# Patient Record
Sex: FEMALE | Race: WHITE | NOT HISPANIC OR LATINO | Employment: OTHER | URBAN - METROPOLITAN AREA
[De-identification: names, ages, dates, MRNs, and addresses within clinical notes are randomized per-mention and may not be internally consistent; named-entity substitution may affect disease eponyms.]

---

## 2017-05-02 ENCOUNTER — APPOINTMENT (OUTPATIENT)
Dept: WOMENS IMAGING | Facility: HOSPITAL | Age: 72
End: 2017-05-02

## 2017-05-02 PROCEDURE — 77063 BREAST TOMOSYNTHESIS BI: CPT | Performed by: RADIOLOGY

## 2017-05-02 PROCEDURE — G0202 SCR MAMMO BI INCL CAD: HCPCS | Performed by: RADIOLOGY

## 2018-01-05 ENCOUNTER — APPOINTMENT (OUTPATIENT)
Dept: LAB | Facility: HOSPITAL | Age: 73
End: 2018-01-05

## 2018-01-05 ENCOUNTER — OFFICE VISIT (OUTPATIENT)
Dept: ONCOLOGY | Facility: CLINIC | Age: 73
End: 2018-01-05

## 2018-01-05 VITALS
HEART RATE: 90 BPM | DIASTOLIC BLOOD PRESSURE: 70 MMHG | BODY MASS INDEX: 19.94 KG/M2 | TEMPERATURE: 97.4 F | OXYGEN SATURATION: 97 % | RESPIRATION RATE: 16 BRPM | HEIGHT: 64 IN | WEIGHT: 116.8 LBS | SYSTOLIC BLOOD PRESSURE: 104 MMHG

## 2018-01-05 DIAGNOSIS — Z85.3 HISTORY OF BREAST CANCER: Primary | ICD-10-CM

## 2018-01-05 PROCEDURE — G0463 HOSPITAL OUTPT CLINIC VISIT: HCPCS | Performed by: INTERNAL MEDICINE

## 2018-01-05 PROCEDURE — 99213 OFFICE O/P EST LOW 20 MIN: CPT | Performed by: INTERNAL MEDICINE

## 2018-01-05 NOTE — PROGRESS NOTES
[facility] OUTPATIENT CLINIC FOLLOW UP VISIT    REASON FOR FOLLOW-UP:    History of stage I, weakly hormone-receptor positive, HER-2 overexpressed, invasive lobular carcinoma of the upper outer left breast with a background of atypical ductal hyperplasia and ductal carcinoma in situ. Ultimately she had a left mastectomy with sentinel lymph node biopsy performed on 07/13/2015.     HISTORY OF PRESENT ILLNESS:  Fany Todd is a 72 y.o. female who returns today for follow up of the above issue.  She continues to feel very well.  She continues peritoneal dialysis.  No new issues with her right breast and she had a mammogram in May which looked good.    PAST MEDICAL, SURGICAL, FAMILY, AND SOCIAL HISTORIES WERE REVIEWED WITH THE PATIENT AND IN THE ELECTRONIC MEDICAL RECORD, AND WERE UPDATED IF INDICATED.    ALLERGIES:  No Known Allergies    MEDICATIONS:  The medication list has been reviewed with the patient by the medical assistant, and the list has been updated in the electronic medical record, which I reviewed.  Medication dosages and frequencies were confirmed to be accurate.    REVIEW OF SYSTEMS:  PAIN:  See Vital Signs below.  GENERAL:  No fevers, chills, night sweats, or unintended weight loss.  SKIN:  No rashes or non-healing lesions.  HEME/LYMPH:  No abnormal bleeding.  No palpable lymphadenopathy.  EYES:  No vision changes or diplopia.  ENT:  No sore throat or difficulty swallowing.  RESPIRATORY:  No cough, shortness of breath, hemoptysis, or wheezing.  CARDIOVASCULAR:  No chest pain, palpitations, orthopnea, or dyspnea on exertion.  GASTROINTESTINAL:  No abdominal pain, nausea, vomiting, constipation, diarrhea, melena, or hematochezia.  GENITOURINARY:  No dysuria or hematuria.  MUSCULOSKELETAL:  No joint pain, swelling, or erythema.  NEUROLOGIC:  No dizziness, loss of consciousness, or seizures.  PSYCHIATRIC:  No depression, anxiety, or mood changes.    Vitals:    01/05/18 1015   BP: 104/70  "  Pulse: 90   Resp: 16   Temp: 97.4 °F (36.3 °C)   TempSrc: Oral   SpO2: 97%   Weight: 53 kg (116 lb 12.8 oz)   Height: 161.5 cm (63.58\")   PainSc: 0-No pain       PHYSICAL EXAMINATION:  GENERAL:  Well-developed well-nourished female; awake, alert and oriented, in no acute distress.  SKIN:  Warm and dry, without rashes, purpura, or petechiae.  HEAD:  Normocephalic, atraumatic.  EYES:  Pupils equal, round and reactive to light.  Extraocular movements intact.  Conjunctivae normal.  EARS:  Hearing intact.  NOSE:  Septum midline.  No excoriations or nasal discharge.  MOUTH:  No stomatitis or ulcers.  Lips are normal.  THROAT:  Oropharynx without lesions or exudates.  NECK:  Supple with good range of motion; no thyromegaly or masses; no JVD or bruits.  LYMPHATICS:  No cervical, supraclavicular, axillary, or inguinal lymphadenopathy.  CHEST:  Lungs are clear to auscultation bilaterally.  No wheezes, rales, or rhonchi.  BREASTS:  The left chest wall and axilla were examined today.  Well-healed surgical incision.  No skin changes or subcutaneous nodules.  No left axillary lymphadenopathy.  The right breast was examined today.  No nodules or nipple discharge.  HEART:  Regular rate; normal rhythm.  No murmurs, gallops or rubs.  ABDOMEN:  Soft, non-tender, non-distended.  Normal active bowel sounds.  No organomegaly.  Peritoneal dialysis catheter present.  EXTREMITIES:  No clubbing, cyanosis, or edema.  NEUROLOGICAL:  No focal neurologic deficits.    DIAGNOSTIC DATA:  Lab Results   Component Value Date    WBC 7.51 11/04/2016    HGB 10.3 (L) 11/04/2016    HCT 29.2 (L) 11/04/2016    MCV 95.1 11/04/2016     11/04/2016       IMAGING:  A mammogram of the right breast was performed on 5/2/2017.  Repeat mammography in 1 year was recommended.      ASSESSMENT:  This is a 72 y.o. female with:  History of stage I, weakly hormone-receptor positive, HER-2 overexpressed, invasive lobular carcinoma of the upper outer left breast with " a background of atypical ductal hyperplasia and ductal carcinoma in situ. Ultimately she had a left mastectomy with sentinel lymph node biopsy performed on 07/13/2015.  Lymph nodes were negative.  No further treatment was advised.  Continue to see her annually.  She will continue to have annual mammography of the right breast which is scheduled in May.    PLAN:   1.  Return in 12 months for follow-up.  No labs.  Breast exam at that time.

## 2018-04-16 ENCOUNTER — HOSPITAL ENCOUNTER (OUTPATIENT)
Dept: CT IMAGING | Facility: HOSPITAL | Age: 73
Discharge: HOME | End: 2018-04-16
Attending: INTERNAL MEDICINE | Admitting: INTERNAL MEDICINE

## 2018-04-16 ENCOUNTER — TRANSCRIBE ORDERS (OUTPATIENT)
Dept: ADMINISTRATIVE | Facility: HOSPITAL | Age: 73
End: 2018-04-16

## 2018-04-16 DIAGNOSIS — R10.30 LOWER ABDOMINAL PAIN: Primary | ICD-10-CM

## 2018-04-16 DIAGNOSIS — R10.30 LOWER ABDOMINAL PAIN: ICD-10-CM

## 2018-04-16 LAB — CREAT BLDA-MCNC: 9.8 MG/DL (ref 0.6–1.3)

## 2018-04-16 PROCEDURE — 82565 ASSAY OF CREATININE: CPT

## 2018-04-16 PROCEDURE — 25010000002 IOPAMIDOL 61 % SOLUTION: Performed by: INTERNAL MEDICINE

## 2018-04-16 PROCEDURE — 74177 CT ABD & PELVIS W/CONTRAST: CPT

## 2018-04-16 RX ADMIN — IOPAMIDOL 85 ML: 612 INJECTION, SOLUTION INTRAVENOUS at 18:26

## 2018-04-16 NOTE — NURSING NOTE
Results given to on call nurse at Dr. Schwab's office. Msg relayed to him and informed by nurse that pt may go. S/L dc'd with cath tip intact.

## 2018-05-08 ENCOUNTER — APPOINTMENT (OUTPATIENT)
Dept: WOMENS IMAGING | Facility: HOSPITAL | Age: 73
End: 2018-05-08

## 2018-05-08 PROCEDURE — 77063 BREAST TOMOSYNTHESIS BI: CPT | Performed by: RADIOLOGY

## 2018-05-08 PROCEDURE — 77067 SCR MAMMO BI INCL CAD: CPT | Performed by: RADIOLOGY

## 2018-07-25 ENCOUNTER — HOSPITAL ENCOUNTER (EMERGENCY)
Facility: HOSPITAL | Age: 73
Discharge: HOME | End: 2018-07-25
Attending: EMERGENCY MEDICINE | Admitting: EMERGENCY MEDICINE

## 2018-07-25 VITALS
OXYGEN SATURATION: 98 % | SYSTOLIC BLOOD PRESSURE: 177 MMHG | TEMPERATURE: 97.7 F | BODY MASS INDEX: 21.63 KG/M2 | HEART RATE: 74 BPM | WEIGHT: 122.1 LBS | HEIGHT: 63 IN | RESPIRATION RATE: 14 BRPM | DIASTOLIC BLOOD PRESSURE: 95 MMHG

## 2018-07-25 DIAGNOSIS — M54.42 ACUTE LEFT-SIDED LOW BACK PAIN WITH LEFT-SIDED SCIATICA: Primary | ICD-10-CM

## 2018-07-25 PROCEDURE — 99283 EMERGENCY DEPT VISIT LOW MDM: CPT

## 2018-07-25 RX ORDER — BACLOFEN 5 MG/1
10 TABLET ORAL 3 TIMES DAILY PRN
Qty: 30 TABLET | Refills: 0 | OUTPATIENT
Start: 2018-07-25 | End: 2019-05-02

## 2018-07-25 RX ORDER — CHOLECALCIFEROL (VITAMIN D3) 50 MCG
2000 TABLET ORAL 3 TIMES DAILY
COMMUNITY

## 2018-07-25 RX ORDER — HYDROCODONE BITARTRATE AND ACETAMINOPHEN 5; 325 MG/1; MG/1
1 TABLET ORAL EVERY 6 HOURS PRN
Qty: 10 TABLET | Refills: 0 | OUTPATIENT
Start: 2018-07-25 | End: 2019-05-02

## 2018-07-25 RX ORDER — METHYLPREDNISOLONE 4 MG/1
TABLET ORAL
Qty: 1 EACH | Refills: 0 | OUTPATIENT
Start: 2018-07-25 | End: 2019-05-02

## 2018-11-02 ENCOUNTER — HOSPITAL ENCOUNTER (EMERGENCY)
Facility: HOSPITAL | Age: 73
Discharge: HOME | End: 2018-11-02
Attending: EMERGENCY MEDICINE | Admitting: EMERGENCY MEDICINE

## 2018-11-02 VITALS
SYSTOLIC BLOOD PRESSURE: 155 MMHG | TEMPERATURE: 96.6 F | RESPIRATION RATE: 18 BRPM | DIASTOLIC BLOOD PRESSURE: 89 MMHG | BODY MASS INDEX: 20.38 KG/M2 | OXYGEN SATURATION: 99 % | WEIGHT: 115 LBS | HEART RATE: 70 BPM | HEIGHT: 63 IN

## 2018-11-02 DIAGNOSIS — M54.42 ACUTE LEFT-SIDED LOW BACK PAIN WITH LEFT-SIDED SCIATICA: Primary | ICD-10-CM

## 2018-11-02 PROCEDURE — 99282 EMERGENCY DEPT VISIT SF MDM: CPT

## 2018-11-02 RX ORDER — METHYLPREDNISOLONE 4 MG/1
TABLET ORAL
Qty: 21 EACH | Refills: 0 | OUTPATIENT
Start: 2018-11-02 | End: 2019-05-02

## 2018-11-02 RX ORDER — HYDROCODONE BITARTRATE AND ACETAMINOPHEN 5; 325 MG/1; MG/1
1 TABLET ORAL EVERY 4 HOURS PRN
Qty: 8 TABLET | Refills: 0 | OUTPATIENT
Start: 2018-11-02 | End: 2019-05-02

## 2018-11-02 NOTE — ED PROVIDER NOTES
EMERGENCY DEPARTMENT ENCOUNTER    CHIEF COMPLAINT  Chief Complaint: back pain  History given by: patient  History limited by: N/A  Time Seen: 1146  Room Number: 05/05  PMD: Carlos Schwab MD      HPI:  Pt is a 73 y.o. female who presents with intermittent left lower back pain radiating to LLE that started about 3 days ago. It is exacerbated by remaining still and lying down and is alleviated by movement and walking. She also reports having intermittent LLE numbness/tingling (currently resolved). She notes that she recently restarted yoga but cannot recall sustaining an injury or trauma. She also denies motor loss, bladder dysfunction, bowel dysfunction, saddle anesthesia, fever, chills, chest pain, dyspnea, abd pain, pain and difficulty with urination, and N/V/D. She notes that she had similar sx in 7/2018 due to sciatica which resolved after she was seen at [facility] and was prescribed pain medication and steroid. Past Medical History of ESRD (performs peritoneal dialysis nightly), HTN, and anemia.    Duration: started about 3 days ago  Timing: intermittent  Location: left lower back  Radiation: LLE  Quality: pain  Intensity/Severity: moderate  Progression: unchanged  Associated Symptoms: intermittent LLE numbness/tingling (currently resolved)  Aggravating Factors: remaining still, lying down  Alleviating Factors: movement, walking  Previous Episodes: Pt states that she had similar sx in 7/2018 due to sciatica which resolved after she was seen at [facility] and was prescribed pain medication and steroid.  Treatment before arrival: none mentioned    PAST MEDICAL HISTORY  Active Ambulatory Problems     Diagnosis Date Noted   • Anemia 11/04/2016   • History of breast cancer 11/04/2016     Resolved Ambulatory Problems     Diagnosis Date Noted   • No Resolved Ambulatory Problems     Past Medical History:   Diagnosis Date   • Anemia    • Breast cancer (CMS/[facility]ly [facility]) 2015   • End stage renal disease (CMS/Formerly [facility])    • Hypertension     • Peritoneal dialysis catheter in place (CMS/Formerly [facility])        PAST SURGICAL HISTORY  Past Surgical History:   Procedure Laterality Date   • APPENDECTOMY     • COLONOSCOPY     • MASTECTOMY Left 01/2015   • TUBAL ABDOMINAL LIGATION         FAMILY HISTORY  Family History   Problem Relation Age of Onset   • Cervical cancer Mother    • Hypertension Father    • Breast cancer Sister    • Hypertension Sister    • Heart disease Maternal Aunt    • Cancer Maternal Aunt    • Cancer Maternal Uncle    • Heart disease Maternal Grandmother    • Cancer Cousin        SOCIAL HISTORY  Social History     Social History   • Marital status: Single     Spouse name: N/A   • Number of children: N/A   • Years of education: High school     Occupational History   • -HR Retired     [employer]     Social History Main Topics   • Smoking status: Never Smoker   • Smokeless tobacco: Not on file   • Alcohol use No   • Drug use: No   • Sexual activity: Defer     Other Topics Concern   • Not on file     Social History Narrative   • No narrative on file         ALLERGIES  Patient has no known allergies.    REVIEW OF SYSTEMS  Review of Systems   Constitutional: Negative for chills and fever.   HENT: Negative for sore throat.    Respiratory: Negative for shortness of breath.    Cardiovascular: Negative for chest pain.   Gastrointestinal: Negative for abdominal pain, diarrhea, nausea and vomiting.        No bowel dysfunction   Genitourinary: Negative for difficulty urinating and dysuria.        No bladder dysfunction   Musculoskeletal: Positive for back pain (left lower back pain radiating to LLE).   Skin: Negative for rash.   Neurological: Positive for numbness ( intermittent LLE numbness/tingling (currently resolved)  ). Negative for dizziness and weakness.        No saddle anesthesia    Psychiatric/Behavioral: The patient is not nervous/anxious.        PHYSICAL EXAM  ED Triage Vitals   Temp Heart Rate Resp BP SpO2   11/02/18 0934  11/02/18 0934 11/02/18 0934 11/02/18 1035 11/02/18 0934   96.6 °F (35.9 °C) 92 18 166/100 99 % WNL       Physical Exam   Constitutional: She is oriented to person, place, and time and well-developed, well-nourished, and in no distress.   HENT:   Head: Normocephalic and atraumatic.   Mouth/Throat: Mucous membranes are normal.   Eyes: EOM are normal. No scleral icterus.   Neck: Normal range of motion.   Cardiovascular: Normal rate, regular rhythm and normal heart sounds.    Pulses:       Dorsalis pedis pulses are 2+ on the right side, and 2+ on the left side.        Posterior tibial pulses are 2+ on the right side, and 2+ on the left side.   Pulmonary/Chest: Effort normal and breath sounds normal. No respiratory distress.   Abdominal: Soft. There is no tenderness. There is no rebound and no guarding.   Musculoskeletal:   Mild left SI joint tenderness, no midline l-spine tenderness, no lumbar tenderness, negative straight leg raises bilaterally, NV intact distally to BLE   Neurological: She is alert and oriented to person, place, and time. She has normal motor skills and normal sensation. Gait normal.   Sensation and motor function intact to BLE   Skin: Skin is warm and dry.   Psychiatric: Mood and affect normal.   Nursing note and vitals reviewed.        MEDICAL RECORD REVIEW    Pt was seen at [facility] ED on 7/25/18 for acute left sided back pain with sciatica. At the time, she was prescribed baclofen, norco, and medrol pack.       PROGRESS AND CONSULTS  1210- Informed pt that her sx could possibly be due to left sided sciatica for which she will be prescribed steroid and short course of pain medication. Pt declines prescription for muscle relaxer. Provided discharge instructions for sciatica. Advised pt to apply ice or heat to back with gentle stretching and to perform activity as tolerated.  Reviewed implications of results, diagnosis, meds, responsibility to follow up, warning signs and symptoms of possible worsening,  "potential complications and reasons to return to ER with patient.  Discussed all results and noted any abnormalities with patient.  Discussed absolute need to recheck abnormalities and condition with PMD closely.   Discussed plan for discharge, as there is no emergent indication for admission.  Pt is agreeable and understands need for follow up and repeat testing.  Pt is aware that discharge does not mean that nothing is wrong but it indicates no emergency is present.  Pt is discharged with instructions to follow up with primary care doctor to have their blood pressure rechecked.     1213- Reviewed pt's history and workup with Dr. Castellanos.  At bedside evaluation, they agree with the plan of care.      DIAGNOSIS  Final diagnoses:   Acute left-sided low back pain with left-sided sciatica       FOLLOW UP   Carlos Schwab MD  [address] [address]  [phone]    Schedule an appointment as soon as possible for a visit   As needed      RX     Medication List       HYDROcodone-acetaminophen 5-325 MG per tablet  Commonly known as:  NORCO  Take 1 tablet by mouth Every 4 (Four) Hours As Needed for Moderate Pain .     MethylPREDNISolone 4 MG tablet  Commonly known as:  MEDROL (KAMRYN)  Take as directed on package instructions.       Otf report [id number] reviewed.  Risks, benefits, alternatives discussed with patient.  Pt consents to treatment and agrees to follow up with PMD tomorrow for further care and any other prescriptions.         MEDICATIONS GIVEN IN ER  Medications - No data to display    /89 (Patient Position: Lying)   Pulse 70   Temp 96.6 °F (35.9 °C) (Tympanic)   Resp 18   Ht 160 cm (63\")   Wt 52.2 kg (115 lb)   SpO2 99%   BMI 20.37 kg/m²       I personally reviewed the past medical history, past surgical history, social history, family history, current medications and allergies as they appear in this chart.  The scribe's note accurately reflects the work and decisions " made by me.     Documentation assistance provided by robson Dick for LINH Cheney on 11/2/2018 at 12:53 PM. Information recorded by the scribe was done at my direction and has been verified and validated by me.       Mayelin, Diane  11/02/18 1256       Sakina Escobedo, SAW  11/03/18 1022

## 2018-11-02 NOTE — ED PROVIDER NOTES
MD ATTESTATION NOTE    Pt presents to the complaining of left lower back pain that began earlier today. Pt states that the pain radiates down her LLE. Pt denies any recent injury and states that her symptoms are similar to her prior episode of sciatica. On exam, the pt has no midline lumbar spine tenderness. Pt is NVI distally. I agree with the plan to discharge the pt home with a prescription for Medrol DosePak.    The RICHIE and I have discussed this patient's history, physical exam, and treatment plan.  I have reviewed the documentation and personally had a face to face interaction with the patient. I affirm the documentation and agree with the treatment and plan.  The attached note describes my personal findings.    Documentation assistance provided by robson Charles for Dr. Castellanos.  Information recorded by the [email or web address] was done at my direction and has been verified and validated by me.       Mindy Charles  11/02/18 1218       Mohit Castellanos MD  11/03/18 0007

## 2018-11-02 NOTE — ED TRIAGE NOTES
Pt arrives with c/o left lower back pain that is radiates into her left leg. She states that she had this same thing happen x2 months ago. She couldn't get into her PCP for ten days so she came here. Pt ambulated to room 05 without difficulty.

## 2018-11-02 NOTE — DISCHARGE INSTRUCTIONS
Medications as ordered  Heat or ice to back with gentle stretching  Activity as tolerated  Follow up with pmd as needed  Return to er for loss of bowel/ bladder function, numbness/ tingling to legs, increased pain or any new or worsening symptoms

## 2018-11-05 ENCOUNTER — EPISODE CHANGES (OUTPATIENT)
Dept: SOCIAL WORK | Facility: HOSPITAL | Age: 73
End: 2018-11-05

## 2018-11-05 ENCOUNTER — EPISODE CHANGES (OUTPATIENT)
Dept: CASE MANAGEMENT | Facility: OTHER | Age: 73
End: 2018-11-05

## 2018-11-05 ENCOUNTER — PATIENT OUTREACH (OUTPATIENT)
Dept: CASE MANAGEMENT | Facility: OTHER | Age: 73
End: 2018-11-05

## 2018-12-14 ENCOUNTER — EPISODE CHANGES (OUTPATIENT)
Dept: CASE MANAGEMENT | Facility: OTHER | Age: 73
End: 2018-12-14

## 2019-03-04 ENCOUNTER — OFFICE VISIT (OUTPATIENT)
Dept: ONCOLOGY | Facility: CLINIC | Age: 74
End: 2019-03-04

## 2019-03-04 ENCOUNTER — LAB (OUTPATIENT)
Dept: LAB | Facility: HOSPITAL | Age: 74
End: 2019-03-04

## 2019-03-04 VITALS
BODY MASS INDEX: 20.26 KG/M2 | HEART RATE: 98 BPM | WEIGHT: 118.7 LBS | OXYGEN SATURATION: 96 % | RESPIRATION RATE: 16 BRPM | TEMPERATURE: 98 F | HEIGHT: 64 IN | DIASTOLIC BLOOD PRESSURE: 76 MMHG | SYSTOLIC BLOOD PRESSURE: 137 MMHG

## 2019-03-04 DIAGNOSIS — Z85.3 HISTORY OF BREAST CANCER: Primary | ICD-10-CM

## 2019-03-04 DIAGNOSIS — D50.8 OTHER IRON DEFICIENCY ANEMIA: Primary | ICD-10-CM

## 2019-03-04 LAB
BASOPHILS # BLD AUTO: 0.07 10*3/MM3 (ref 0–0.2)
BASOPHILS NFR BLD AUTO: 0.9 % (ref 0–1.5)
DEPRECATED RDW RBC AUTO: 48.4 FL (ref 37–54)
EOSINOPHIL # BLD AUTO: 0.6 10*3/MM3 (ref 0–0.4)
EOSINOPHIL NFR BLD AUTO: 7.6 % (ref 0.3–6.2)
ERYTHROCYTE [DISTWIDTH] IN BLOOD BY AUTOMATED COUNT: 14.3 % (ref 12.3–15.4)
HCT VFR BLD AUTO: 31.2 % (ref 34–46.6)
HGB BLD-MCNC: 10.9 G/DL (ref 12–15.9)
IMM GRANULOCYTES # BLD AUTO: 0.03 10*3/MM3 (ref 0–0.05)
IMM GRANULOCYTES NFR BLD AUTO: 0.4 % (ref 0–0.5)
LYMPHOCYTES # BLD AUTO: 1.41 10*3/MM3 (ref 0.7–3.1)
LYMPHOCYTES NFR BLD AUTO: 17.8 % (ref 19.6–45.3)
MCH RBC QN AUTO: 32.2 PG (ref 26.6–33)
MCHC RBC AUTO-ENTMCNC: 34.9 G/DL (ref 31.5–35.7)
MCV RBC AUTO: 92 FL (ref 79–97)
MONOCYTES # BLD AUTO: 0.48 10*3/MM3 (ref 0.1–0.9)
MONOCYTES NFR BLD AUTO: 6 % (ref 5–12)
NEUTROPHILS # BLD AUTO: 5.35 10*3/MM3 (ref 1.4–7)
NEUTROPHILS NFR BLD AUTO: 67.3 % (ref 42.7–76)
NRBC BLD AUTO-RTO: 0 /100 WBC (ref 0–0)
PLATELET # BLD AUTO: 295 10*3/MM3 (ref 140–450)
PMV BLD AUTO: 9.9 FL (ref 6–12)
RBC # BLD AUTO: 3.39 10*6/MM3 (ref 3.77–5.28)
WBC NRBC COR # BLD: 7.94 10*3/MM3 (ref 3.4–10.8)

## 2019-03-04 PROCEDURE — 36416 COLLJ CAPILLARY BLOOD SPEC: CPT | Performed by: INTERNAL MEDICINE

## 2019-03-04 PROCEDURE — 99213 OFFICE O/P EST LOW 20 MIN: CPT | Performed by: INTERNAL MEDICINE

## 2019-03-04 PROCEDURE — 85025 COMPLETE CBC W/AUTO DIFF WBC: CPT | Performed by: INTERNAL MEDICINE

## 2019-03-04 RX ORDER — AMLODIPINE BESYLATE 5 MG/1
5 TABLET ORAL DAILY
Refills: 3 | COMMUNITY
Start: 2018-12-17 | End: 2022-01-16 | Stop reason: HOSPADM

## 2019-03-04 NOTE — PROGRESS NOTES
[facility] OUTPATIENT CLINIC FOLLOW UP VISIT    REASON FOR FOLLOW-UP:    History of stage I, weakly hormone-receptor positive, HER-2 overexpressed, invasive lobular carcinoma of the upper outer left breast with a background of atypical ductal hyperplasia and ductal carcinoma in situ. Ultimately she had a left mastectomy with sentinel lymph node biopsy performed on 07/13/2015.     HISTORY OF PRESENT ILLNESS:  Fany Todd is a 74 y.o. female who returns today for follow up of the above issue.  She continues to feel very well.  She continues peritoneal dialysis which is through the night now.  No new issues with her right breast or left chest wall and she had a mammogram in May which looked good.    PAST MEDICAL, SURGICAL, FAMILY, AND SOCIAL HISTORIES WERE REVIEWED WITH THE PATIENT AND IN THE ELECTRONIC MEDICAL RECORD, AND WERE UPDATED IF INDICATED.    ALLERGIES:  No Known Allergies    MEDICATIONS:  The medication list has been reviewed with the patient by the medical assistant, and the list has been updated in the electronic medical record, which I reviewed.  Medication dosages and frequencies were confirmed to be accurate.    REVIEW OF SYSTEMS:  PAIN:  See Vital Signs below.  GENERAL:  No fevers, chills, night sweats, or unintended weight loss.  SKIN:  No rashes or non-healing lesions.  HEME/LYMPH:  No abnormal bleeding.  No palpable lymphadenopathy.  EYES:  No vision changes or diplopia.  ENT:  No sore throat or difficulty swallowing.  RESPIRATORY:  No cough, shortness of breath, hemoptysis, or wheezing.  CARDIOVASCULAR:  No chest pain, palpitations, orthopnea, or dyspnea on exertion.  GASTROINTESTINAL:  No abdominal pain, nausea, vomiting, constipation, diarrhea, melena, or hematochezia.  GENITOURINARY:  No dysuria or hematuria.  MUSCULOSKELETAL:  No joint pain, swelling, or erythema.  NEUROLOGIC:  No dizziness, loss of consciousness, or seizures.  PSYCHIATRIC:  No depression, anxiety, or mood  "changes.    Vitals:    03/04/19 1459   BP: 137/76   Pulse: 98   Resp: 16   Temp: 98 °F (36.7 °C)   TempSrc: Oral   SpO2: 96%   Weight: 53.8 kg (118 lb 11.2 oz)   Height: 162 cm (63.78\")   PainSc: 0-No pain  Comment: breast cancer       PHYSICAL EXAMINATION:  GENERAL:  Well-developed well-nourished female; awake, alert and oriented, in no acute distress.  SKIN:  Warm and dry, without rashes, purpura, or petechiae.  HEAD:  Normocephalic, atraumatic.  EYES:  Pupils equal, round and reactive to light.  Extraocular movements intact.  Conjunctivae normal.  EARS:  Hearing intact.  NOSE:  Septum midline.  No excoriations or nasal discharge.  MOUTH:  No stomatitis or ulcers.  Lips are normal.  THROAT:  Oropharynx without lesions or exudates.  NECK:  Supple with good range of motion; no thyromegaly or masses; no JVD or bruits.  LYMPHATICS:  No cervical, supraclavicular, axillary lymphadenopathy.  CHEST:  Lungs are clear to auscultation bilaterally.  No wheezes, rales, or rhonchi.  BREASTS:  The left chest wall and axilla were examined today.  Well-healed surgical incision.  No skin changes or subcutaneous nodules.  No left axillary lymphadenopathy.  The right breast was examined today.  No nodules or nipple discharge.  No right axillary adenopathy  HEART:  Regular rate; normal rhythm.  No murmurs, gallops or rubs.  ABDOMEN:   Peritoneal dialysis catheter present.  EXTREMITIES:  No clubbing, cyanosis, or edema.  NEUROLOGICAL:  No focal neurologic deficits.    DIAGNOSTIC DATA:  Results for orders placed or performed in visit on 03/04/19   CBC Auto Differential   Result Value Ref Range    WBC 7.94 3.40 - 10.80 10*3/mm3    RBC 3.39 (L) 3.77 - 5.28 10*6/mm3    Hemoglobin 10.9 (L) 12.0 - 15.9 g/dL    Hematocrit 31.2 (L) 34.0 - 46.6 %    MCV 92.0 79.0 - 97.0 fL    MCH 32.2 26.6 - 33.0 pg    MCHC 34.9 31.5 - 35.7 g/dL    RDW 14.3 12.3 - 15.4 %    RDW-SD 48.4 37.0 - 54.0 fl    MPV 9.9 6.0 - 12.0 fL    Platelets 295 140 - 450 10*3/mm3 "    Neutrophil % 67.3 42.7 - 76.0 %    Lymphocyte % 17.8 (L) 19.6 - 45.3 %    Monocyte % 6.0 5.0 - 12.0 %    Eosinophil % 7.6 (H) 0.3 - 6.2 %    Basophil % 0.9 0.0 - 1.5 %    Immature Grans % 0.4 0.0 - 0.5 %    Neutrophils, Absolute 5.35 1.40 - 7.00 10*3/mm3    Lymphocytes, Absolute 1.41 0.70 - 3.10 10*3/mm3    Monocytes, Absolute 0.48 0.10 - 0.90 10*3/mm3    Eosinophils, Absolute 0.60 (H) 0.00 - 0.40 10*3/mm3    Basophils, Absolute 0.07 0.00 - 0.20 10*3/mm3    Immature Grans, Absolute 0.03 0.00 - 0.05 10*3/mm3    nRBC 0.0 0.0 - 0.0 /100 WBC       IMAGING:  A mammogram of the right breast was performed on 5/8/2018, BI-RADS category 1.  Repeat mammography in 1 year was recommended.      ASSESSMENT:  This is a 74 y.o. female with:  History of stage I, weakly hormone-receptor positive, HER-2 overexpressed, invasive lobular carcinoma of the upper outer left breast with a background of atypical ductal hyperplasia and ductal carcinoma in situ. Ultimately she had a left mastectomy with sentinel lymph node biopsy performed on 07/13/2015.  Lymph nodes were negative.  No further treatment was advised.  Continue to see her annually.  She will continue to have annual mammography of the right breast which is scheduled in May.    PLAN:   1.  Return in 12 months for follow-up.  Breast exam at that time.  No labs necessary.

## 2019-05-02 ENCOUNTER — HOSPITAL ENCOUNTER (EMERGENCY)
Facility: HOSPITAL | Age: 74
Discharge: HOME | End: 2019-05-02
Attending: EMERGENCY MEDICINE | Admitting: EMERGENCY MEDICINE

## 2019-05-02 VITALS
WEIGHT: 115 LBS | SYSTOLIC BLOOD PRESSURE: 182 MMHG | DIASTOLIC BLOOD PRESSURE: 105 MMHG | BODY MASS INDEX: 20.38 KG/M2 | RESPIRATION RATE: 16 BRPM | HEIGHT: 63 IN | TEMPERATURE: 98.6 F | HEART RATE: 76 BPM | OXYGEN SATURATION: 100 %

## 2019-05-02 DIAGNOSIS — M54.41 ACUTE RIGHT-SIDED BACK PAIN WITH SCIATICA: Primary | ICD-10-CM

## 2019-05-02 PROCEDURE — 99282 EMERGENCY DEPT VISIT SF MDM: CPT

## 2019-05-02 RX ORDER — BACLOFEN 10 MG/1
10 TABLET ORAL 3 TIMES DAILY
Qty: 30 TABLET | Refills: 0 | Status: SHIPPED | OUTPATIENT
Start: 2019-05-02 | End: 2019-05-02 | Stop reason: SDUPTHER

## 2019-05-02 RX ORDER — METHYLPREDNISOLONE 4 MG/1
TABLET ORAL
Qty: 1 EACH | Refills: 0 | Status: SHIPPED | OUTPATIENT
Start: 2019-05-02 | End: 2021-12-26

## 2019-05-02 RX ORDER — METHYLPREDNISOLONE 4 MG/1
TABLET ORAL
Qty: 1 EACH | Refills: 0 | Status: SHIPPED | OUTPATIENT
Start: 2019-05-02 | End: 2019-05-02 | Stop reason: SDUPTHER

## 2019-05-02 RX ORDER — BACLOFEN 10 MG/1
10 TABLET ORAL 3 TIMES DAILY
Qty: 30 TABLET | Refills: 0 | Status: SHIPPED | OUTPATIENT
Start: 2019-05-02 | End: 2021-12-26

## 2019-05-02 NOTE — ED PROVIDER NOTES
EMERGENCY DEPARTMENT ENCOUNTER    CHIEF COMPLAINT  Chief Complaint: R hip pain  History given by: pt  History limited by: none  Room Number: 38/38  PMD: Lilibeth Cota MD      HPI:  Pt is a 74 y.o. female who presents complaining of R hip pain radiating down the RLE starting one month ago but worsening severely in the last 3 days. Pt denies injury. Pt confirms R thigh numbness. Pt denies loss of control over bowel or bladder, fever, and other complaints at this time.     Duration:  One month  Onset: gradual  Timing: constant  Location: R hip  Radiation: LLE pain and numbness   Quality: sharp  Intensity/Severity: moderate  Progression: worsening  Associated Symptoms: RLE numbness  Aggravating Factors: none  Alleviating Factors: none  Previous Episodes: one  Treatment before arrival: none    PAST MEDICAL HISTORY  Active Ambulatory Problems     Diagnosis Date Noted   • Anemia 11/04/2016   • History of breast cancer 11/04/2016     Resolved Ambulatory Problems     Diagnosis Date Noted   • No Resolved Ambulatory Problems     Past Medical History:   Diagnosis Date   • Anemia    • Breast cancer (CMS/HCC) 2015   • Chronic renal insufficiency    • End stage renal disease (CMS/HCC)    • Hyperlipidemia    • Hypertension    • Peritoneal dialysis catheter in place (CMS/HCC)        PAST SURGICAL HISTORY  Past Surgical History:   Procedure Laterality Date   • APPENDECTOMY     • BREAST BIOPSY Left 2015   • COLONOSCOPY     • MASTECTOMY Left 2015   • TUBAL ABDOMINAL LIGATION         FAMILY HISTORY  Family History   Problem Relation Age of Onset   • Cervical cancer Mother    • Hypertension Father    • Breast cancer Sister    • Hypertension Sister    • Heart disease Maternal Aunt    • Cancer Maternal Aunt    • Cancer Maternal Uncle    • Heart disease Maternal Grandmother    • Cancer Cousin        SOCIAL HISTORY  Social History     Socioeconomic History   • Marital status: Single     Spouse name: Not on file   • Number of children:  Not on file   • Years of education: High school   • Highest education level: Not on file   Occupational History   • Occupation: -HR     Employer: RETIRED     Comment: [employer]   Tobacco Use   • Smoking status: Never Smoker   • Smokeless tobacco: Never Used   Substance and Sexual Activity   • Alcohol use: No   • Drug use: No   • Sexual activity: Defer       ALLERGIES  Patient has no known allergies.    REVIEW OF SYSTEMS  Review of Systems   Constitutional: Negative for fever.   HENT: Negative for sore throat.    Eyes: Negative.    Respiratory: Negative for cough and shortness of breath.    Cardiovascular: Negative for chest pain.   Gastrointestinal: Negative for abdominal pain, diarrhea and vomiting.   Genitourinary: Negative for dysuria.   Musculoskeletal: Negative for neck pain.        R hip pain   Skin: Negative for rash.   Neurological: Positive for numbness (RLE (thigh)). Negative for weakness and headaches.   Hematological: Negative.    All other systems reviewed and are negative.      PHYSICAL EXAM  ED Triage Vitals [05/02/19 1546]   Temp Heart Rate Resp BP SpO2   98.6 °F (37 °C) 100 18 -- 95 %      Temp src Heart Rate Source Patient Position BP Location FiO2 (%)   Tympanic Monitor -- -- --       Physical Exam   Constitutional: She is oriented to person, place, and time. No distress.   HENT:   Head: Normocephalic and atraumatic.   Eyes: EOM are normal. Pupils are equal, round, and reactive to light.   Neck: Normal range of motion. Neck supple.   Cardiovascular: Normal rate, regular rhythm and normal heart sounds.   Pulmonary/Chest: Effort normal and breath sounds normal. No respiratory distress.   Abdominal: Soft. There is no tenderness. There is no rebound and no guarding.   Musculoskeletal: Normal range of motion. She exhibits tenderness (R lateral hip; full ROM; negative straight leg raise). She exhibits no edema.   Neurological: She is alert and oriented to person, place, and time.  She has normal sensation and normal strength.   Skin: Skin is warm and dry. No rash noted.   Psychiatric: Mood and affect normal.   Nursing note and vitals reviewed.      PROCEDURES  Procedures      PROGRESS AND CONSULTS     1714. Ordered Liresak and Medrol for pt's pain.    1730. Rechecked pt. Pt resting comfortably. Discussed plan of discharge with patient, who understands and agrees. All questions answered.       MEDICAL DECISION MAKING  Results were reviewed/discussed with the patient and they were also made aware of online access. Pt also made aware that some labs, such as cultures, will not be resulted during ER visit and follow up with PMD is necessary.     MDM  Number of Diagnoses or Management Options     Amount and/or Complexity of Data Reviewed  Decide to obtain previous medical records or to obtain history from someone other than the patient: yes  Review and summarize past medical records: yes (seen in ED over past year for L sided sciatica)    Patient Progress  Patient progress: stable         DIAGNOSIS  Final diagnoses:   Acute right-sided back pain with sciatica       DISPOSITION  DISCHARGE    Patient discharged in stable condition.    Reviewed implications of results, diagnosis, meds, responsibility to follow up, warning signs and symptoms of possible worsening, potential complications and reasons to return to ER.  Patient/Family voiced understanding of above instructions.    Discussed plan for discharge, as there is no emergent indication for admission. Patient referred to primary care provider for BP management due to today's BP. Pt/family is agreeable and understands need for follow up and repeat testing.  Pt is aware that discharge does not mean that nothing is wrong but it indicates no emergency is present that requires admission and they must continue care with follow-up as given below or physician of their choice.     FOLLOW-UP  Lilibeth Cota MD  [id number]  [place] [address]  [phone]    Schedule an appointment as soon as possible for a visit            Medication List      Changed    baclofen 10 MG tablet  Commonly known as:  LIORESAL  Take 1 tablet by mouth 3 (Three) Times a Day.  What changed:    medication strength  when to take this  reasons to take this     methylPREDNISolone 4 MG tablet  Commonly known as:  MEDROL (KAMRYN)  Take as directed on package instructions.  What changed:  Another medication with the same name was removed. Continue   taking this medication, and follow the directions you see here.        Stop    HYDROcodone-acetaminophen 5-325 MG per tablet  Commonly known as:  NORCO              Latest Documented Vital Signs:  As of 5:35 PM  BP-   HR- 100 Temp- 98.6 °F (37 °C) (Tympanic) O2 sat- 95%    --  Documentation assistance provided by robson Muse for Dr. Cannon.  Information recorded by the scribe was done at my direction and has been verified and validated by me.       Fiona Muse  05/02/19 8139       Alexandro Cannon MD  05/02/19 3610

## 2019-05-14 ENCOUNTER — APPOINTMENT (OUTPATIENT)
Dept: WOMENS IMAGING | Facility: HOSPITAL | Age: 74
End: 2019-05-14

## 2019-05-14 PROCEDURE — 77067 SCR MAMMO BI INCL CAD: CPT | Performed by: RADIOLOGY

## 2019-05-14 PROCEDURE — 77063 BREAST TOMOSYNTHESIS BI: CPT | Performed by: RADIOLOGY

## 2019-07-16 ENCOUNTER — TRANSCRIBE ORDERS (OUTPATIENT)
Dept: ADMINISTRATIVE | Facility: HOSPITAL | Age: 74
End: 2019-07-16

## 2019-07-16 DIAGNOSIS — N20.0 KIDNEY STONES: Primary | ICD-10-CM

## 2019-07-24 ENCOUNTER — HOSPITAL ENCOUNTER (OUTPATIENT)
Dept: CT IMAGING | Facility: HOSPITAL | Age: 74
Discharge: HOME | End: 2019-07-24
Admitting: INTERNAL MEDICINE

## 2019-07-24 DIAGNOSIS — N20.0 KIDNEY STONES: ICD-10-CM

## 2019-07-24 PROCEDURE — 74176 CT ABD & PELVIS W/O CONTRAST: CPT

## 2019-09-03 ENCOUNTER — OFFICE VISIT (OUTPATIENT)
Dept: NEUROSURGERY | Facility: CLINIC | Age: 74
End: 2019-09-03

## 2019-09-03 VITALS
SYSTOLIC BLOOD PRESSURE: 156 MMHG | HEIGHT: 63 IN | BODY MASS INDEX: 20.8 KG/M2 | HEART RATE: 89 BPM | WEIGHT: 117.4 LBS | DIASTOLIC BLOOD PRESSURE: 86 MMHG

## 2019-09-03 DIAGNOSIS — M54.42 ACUTE BILATERAL LOW BACK PAIN WITH LEFT-SIDED SCIATICA: Primary | ICD-10-CM

## 2019-09-03 PROCEDURE — 99203 OFFICE O/P NEW LOW 30 MIN: CPT | Performed by: NURSE PRACTITIONER

## 2019-09-03 RX ORDER — POTASSIUM CHLORIDE 20 MEQ/1
20 TABLET, EXTENDED RELEASE ORAL DAILY
COMMUNITY
Start: 2019-08-31 | End: 2021-12-28 | Stop reason: HOSPADM

## 2019-09-03 RX ORDER — GENTAMICIN SULFATE 1 MG/G
1 CREAM TOPICAL DAILY
Refills: 6 | COMMUNITY
Start: 2019-08-15 | End: 2022-01-16 | Stop reason: HOSPADM

## 2019-09-03 NOTE — PROGRESS NOTES
"Subjective   History of Present Illness: Fany Todd is a 74 y.o. female is being seen for consultation today at the request of Carlos Schwab MD  For back and intermittent left leg pain.  She denies any cause or injury.  She completed a MDP early summer with little relief. She has not had any treatments or scans.  Mrs. Todd take Tylenol 500 mg for pain.     Changing positions aggravates. She gets pain in the thoracic and low back pain with occasional radiating pain to the left leg. She cannot take NSAIDs because she is on dialysis.       Back Pain   The problem occurs intermittently. The problem has been gradually worsening since onset. The pain is present in the lumbar spine and thoracic spine. The quality of the pain is described as aching. The pain does not radiate. The pain is at a severity of 2/10. The patient is experiencing no pain. The pain is worse during the night. Pertinent negatives include no bladder incontinence, bowel incontinence, chest pain, headaches or weakness. She has tried nothing for the symptoms.       The following portions of the patient's history were reviewed and updated as appropriate: allergies, current medications, past family history, past medical history, past social history, past surgical history and problem list.    Review of Systems   Respiratory: Negative for chest tightness and shortness of breath.    Cardiovascular: Negative for chest pain.   Gastrointestinal: Negative for bowel incontinence.   Genitourinary: Negative for bladder incontinence and difficulty urinating.   Musculoskeletal: Positive for back pain.   Skin: Negative for rash.   Neurological: Negative for weakness and headaches.   All other systems reviewed and are negative.      Objective     Vitals:    09/03/19 1258   BP: 156/86   Pulse: 89   Weight: 53.3 kg (117 lb 6.4 oz)   Height: 160 cm (63\")     Body mass index is 20.8 kg/m².      Physical Exam  Neurologic Exam    Physical Exam:    CONSTITUTIONAL: " This 74  year old right handed  female appears well developed, well-nourished and in no acute distress.    HEAD & FACE: the head and face are symmetric, normocephalic and atraumatic.    EYES: Inspection of the conjunctivae and lids reveals no swelling, erythema or discharge.  Pupils are round, equal and reactive to light and there is no scleral icterus.    EARS, NOSE, MOUTH & THROAT: On inspection, the ears and nose are within normal limits.    NECK: the neck is supple and symmetric. The trachea is midline with no masses.    PULMONARY: Respiratory effort is normal with no increased work of breathing or signs of respiratory distress.    CARDIOVASCULAR: Pedal pulses are +2/4 bilaterally. Examination of the extremities shows no edema or varicosities.    LYMPHATIC: There is no palpable lymphadenopathy of the neck.    MUSCULOSKELETAL: Gait and station are within normal limits. The spine has normal alignment and range of motion,    SKIN: The skin is warm, dry and intact    NEUROLOGIC:   Cranial Nerves 2-12 intact  Normal motor strength noted. Muscle bulk and tone are normal.  Sensory exam is normal to all modalities.  Reflexes on the right side demonstrates 1/4 Triceps Reflex, 1/4 Biceps Reflex, 1/4 Brachioradialis Reflex, 1/4 Knee Jerk Reflex, 0/4 Ankle Jerk Reflex and no ankle clonus on the right.   Reflexes on the left side demonstrates 1/4 Triceps Reflex, 1/4 Biceps Reflex, 1/4 Brachioradialis Reflex, 1/4 Knee Jerk Reflex, 0/4 Ankle Jerk Reflex and no ankle clonus on the left.  Superficial/Primitive Reflexes: primitive reflexes were absent.  No coordination deficit observed.  Radicular testing showed a negative Tuan (FELIPE) test and negative straight leg raise.  Cortical function is intact and without deficits. Speech is normal.    PSYCHIATRIC: oriented to person, place and time. Patient's mood and affect are normal.  Assessment/Plan   Independent Review of Radiographic Studies:      I personally reviewed  the images from the following studies.    No recent testing    Medical Decision Making:      She has intermittent back pain with only one episode of left leg pain. She has no loss of nerve function on exam. We will have her try therapy for three weeks which is generally enough to relieve back pain to a satisfactory degree. She will be seen back in one month to see how she is progressing. If the back pain persists, an MRI will be justified at that point. Pain management may be the next best option depending on the MRI results if it comes to it.     Fany was seen today for back pain and leg pain.    Diagnoses and all orders for this visit:    Acute bilateral low back pain with left-sided sciatica  -     Ambulatory Referral to Physical Therapy Evaluate and treat; (any); Stretching, ROM, Strengthening      Return in about 4 weeks (around 10/1/2019).           Kobe Enrique MD FACS FAANS  Neurological Surgery

## 2020-05-19 ENCOUNTER — APPOINTMENT (OUTPATIENT)
Dept: WOMENS IMAGING | Facility: HOSPITAL | Age: 75
End: 2020-05-19

## 2020-05-19 PROCEDURE — 77067 SCR MAMMO BI INCL CAD: CPT | Performed by: RADIOLOGY

## 2020-05-19 PROCEDURE — 77063 BREAST TOMOSYNTHESIS BI: CPT | Performed by: RADIOLOGY

## 2021-05-25 ENCOUNTER — APPOINTMENT (OUTPATIENT)
Dept: WOMENS IMAGING | Facility: HOSPITAL | Age: 76
End: 2021-05-25

## 2021-05-25 PROCEDURE — 77067 SCR MAMMO BI INCL CAD: CPT | Performed by: RADIOLOGY

## 2021-05-25 PROCEDURE — 77063 BREAST TOMOSYNTHESIS BI: CPT | Performed by: RADIOLOGY

## 2021-10-26 ENCOUNTER — IMMUNIZATION (OUTPATIENT)
Dept: VACCINE CLINIC | Facility: HOSPITAL | Age: 76
End: 2021-10-26

## 2021-10-26 PROCEDURE — 0004A ADM SARSCOV2 30MCG/0.3ML BOOSTER: CPT | Performed by: INTERNAL MEDICINE

## 2021-10-26 PROCEDURE — 91300 HC SARSCOV02 VAC 30MCG/0.3ML IM: CPT | Performed by: INTERNAL MEDICINE

## 2021-12-26 ENCOUNTER — HOSPITAL ENCOUNTER (OUTPATIENT)
Facility: HOSPITAL | Age: 76
Discharge: HOME | End: 2021-12-28
Attending: EMERGENCY MEDICINE | Admitting: SURGERY

## 2021-12-26 ENCOUNTER — ANESTHESIA EVENT (OUTPATIENT)
Dept: PERIOP | Facility: HOSPITAL | Age: 76
End: 2021-12-26

## 2021-12-26 DIAGNOSIS — N18.6 ESRD (END STAGE RENAL DISEASE): Primary | ICD-10-CM

## 2021-12-26 DIAGNOSIS — T85.611A PERITONEAL DIALYSIS CATHETER DYSFUNCTION, INITIAL ENCOUNTER: ICD-10-CM

## 2021-12-26 LAB
ALBUMIN SERPL-MCNC: 3.3 G/DL (ref 3.5–5.2)
ALBUMIN/GLOB SERPL: 1.2 G/DL
ALP SERPL-CCNC: 126 U/L (ref 39–117)
ALT SERPL W P-5'-P-CCNC: 14 U/L (ref 1–33)
ANION GAP SERPL CALCULATED.3IONS-SCNC: 18 MMOL/L (ref 5–15)
AST SERPL-CCNC: 16 U/L (ref 1–32)
BASOPHILS # BLD AUTO: 0.05 10*3/MM3 (ref 0–0.2)
BASOPHILS NFR BLD AUTO: 0.6 % (ref 0–1.5)
BILIRUB SERPL-MCNC: 0.3 MG/DL (ref 0–1.2)
BUN SERPL-MCNC: 53 MG/DL (ref 8–23)
BUN/CREAT SERPL: 4.8 (ref 7–25)
CALCIUM SPEC-SCNC: 9 MG/DL (ref 8.6–10.5)
CHLORIDE SERPL-SCNC: 101 MMOL/L (ref 98–107)
CO2 SERPL-SCNC: 25 MMOL/L (ref 22–29)
CREAT SERPL-MCNC: 11.15 MG/DL (ref 0.57–1)
DEPRECATED RDW RBC AUTO: 52.4 FL (ref 37–54)
EOSINOPHIL # BLD AUTO: 0.4 10*3/MM3 (ref 0–0.4)
EOSINOPHIL NFR BLD AUTO: 5 % (ref 0.3–6.2)
ERYTHROCYTE [DISTWIDTH] IN BLOOD BY AUTOMATED COUNT: 14.5 % (ref 12.3–15.4)
GFR SERPL CREATININE-BSD FRML MDRD: 3 ML/MIN/1.73
GFR SERPL CREATININE-BSD FRML MDRD: ABNORMAL ML/MIN/{1.73_M2}
GLOBULIN UR ELPH-MCNC: 2.7 GM/DL
GLUCOSE SERPL-MCNC: 92 MG/DL (ref 65–99)
HCT VFR BLD AUTO: 29 % (ref 34–46.6)
HGB BLD-MCNC: 9.8 G/DL (ref 12–15.9)
IMM GRANULOCYTES # BLD AUTO: 0.05 10*3/MM3 (ref 0–0.05)
IMM GRANULOCYTES NFR BLD AUTO: 0.6 % (ref 0–0.5)
LYMPHOCYTES # BLD AUTO: 0.94 10*3/MM3 (ref 0.7–3.1)
LYMPHOCYTES NFR BLD AUTO: 11.8 % (ref 19.6–45.3)
MCH RBC QN AUTO: 33.4 PG (ref 26.6–33)
MCHC RBC AUTO-ENTMCNC: 33.8 G/DL (ref 31.5–35.7)
MCV RBC AUTO: 99 FL (ref 79–97)
MONOCYTES # BLD AUTO: 0.5 10*3/MM3 (ref 0.1–0.9)
MONOCYTES NFR BLD AUTO: 6.3 % (ref 5–12)
NEUTROPHILS NFR BLD AUTO: 6.04 10*3/MM3 (ref 1.7–7)
NEUTROPHILS NFR BLD AUTO: 75.7 % (ref 42.7–76)
NRBC BLD AUTO-RTO: 0 /100 WBC (ref 0–0.2)
PLATELET # BLD AUTO: 253 10*3/MM3 (ref 140–450)
PMV BLD AUTO: 10 FL (ref 6–12)
POTASSIUM SERPL-SCNC: 5.5 MMOL/L (ref 3.5–5.2)
PROT SERPL-MCNC: 6 G/DL (ref 6–8.5)
RBC # BLD AUTO: 2.93 10*6/MM3 (ref 3.77–5.28)
SARS-COV-2 RNA RESP QL NAA+PROBE: NOT DETECTED
SODIUM SERPL-SCNC: 144 MMOL/L (ref 136–145)
WBC NRBC COR # BLD: 7.98 10*3/MM3 (ref 3.4–10.8)

## 2021-12-26 PROCEDURE — C9803 HOPD COVID-19 SPEC COLLECT: HCPCS

## 2021-12-26 PROCEDURE — U0003 INFECTIOUS AGENT DETECTION BY NUCLEIC ACID (DNA OR RNA); SEVERE ACUTE RESPIRATORY SYNDROME CORONAVIRUS 2 (SARS-COV-2) (CORONAVIRUS DISEASE [COVID-19]), AMPLIFIED PROBE TECHNIQUE, MAKING USE OF HIGH THROUGHPUT TECHNOLOGIES AS DESCRIBED BY CMS-2020-01-R: HCPCS | Performed by: NURSE PRACTITIONER

## 2021-12-26 PROCEDURE — U0005 INFEC AGEN DETEC AMPLI PROBE: HCPCS | Performed by: NURSE PRACTITIONER

## 2021-12-26 PROCEDURE — 85025 COMPLETE CBC W/AUTO DIFF WBC: CPT | Performed by: NURSE PRACTITIONER

## 2021-12-26 PROCEDURE — 99284 EMERGENCY DEPT VISIT MOD MDM: CPT

## 2021-12-26 PROCEDURE — 80053 COMPREHEN METABOLIC PANEL: CPT | Performed by: NURSE PRACTITIONER

## 2021-12-26 PROCEDURE — G0378 HOSPITAL OBSERVATION PER HR: HCPCS

## 2021-12-26 RX ORDER — CALCITRIOL 0.25 UG/1
0.25 CAPSULE, LIQUID FILLED ORAL DAILY
Status: DISCONTINUED | OUTPATIENT
Start: 2021-12-26 | End: 2021-12-28 | Stop reason: HOSPADM

## 2021-12-26 RX ORDER — FAMOTIDINE 20 MG/1
20 TABLET, FILM COATED ORAL 2 TIMES DAILY
Status: DISCONTINUED | OUTPATIENT
Start: 2021-12-26 | End: 2021-12-28 | Stop reason: HOSPADM

## 2021-12-26 RX ORDER — ATORVASTATIN CALCIUM 20 MG/1
20 TABLET, FILM COATED ORAL NIGHTLY
Status: DISCONTINUED | OUTPATIENT
Start: 2021-12-26 | End: 2021-12-27

## 2021-12-26 RX ORDER — CALCITRIOL 0.25 UG/1
0.25 CAPSULE, LIQUID FILLED ORAL DAILY
COMMUNITY
Start: 2021-07-19

## 2021-12-26 RX ORDER — CINACALCET HYDROCHLORIDE 30 MG/1
30 TABLET, COATED ORAL DAILY
COMMUNITY
Start: 2021-07-19 | End: 2021-12-28 | Stop reason: HOSPADM

## 2021-12-26 RX ORDER — MELATONIN
1000 DAILY
Status: DISCONTINUED | OUTPATIENT
Start: 2021-12-26 | End: 2021-12-28 | Stop reason: HOSPADM

## 2021-12-26 RX ORDER — SODIUM CHLORIDE 0.9 % (FLUSH) 0.9 %
10 SYRINGE (ML) INJECTION AS NEEDED
Status: DISCONTINUED | OUTPATIENT
Start: 2021-12-26 | End: 2021-12-28 | Stop reason: HOSPADM

## 2021-12-26 RX ORDER — AMLODIPINE BESYLATE 5 MG/1
5 TABLET ORAL DAILY
Status: DISCONTINUED | OUTPATIENT
Start: 2021-12-26 | End: 2021-12-28 | Stop reason: HOSPADM

## 2021-12-26 RX ADMIN — CALCITRIOL 0.25 MCG: 0.25 CAPSULE ORAL at 22:02

## 2021-12-26 RX ADMIN — FAMOTIDINE 20 MG: 20 TABLET, FILM COATED ORAL at 20:09

## 2021-12-26 RX ADMIN — SODIUM ZIRCONIUM CYCLOSILICATE 10 G: 10 POWDER, FOR SUSPENSION ORAL at 22:03

## 2021-12-26 RX ADMIN — ATORVASTATIN CALCIUM 20 MG: 20 TABLET, FILM COATED ORAL at 20:08

## 2021-12-26 RX ADMIN — AMLODIPINE BESYLATE 5 MG: 5 TABLET ORAL at 20:09

## 2021-12-26 RX ADMIN — Medication 1000 UNITS: at 22:02

## 2021-12-27 ENCOUNTER — ANESTHESIA (OUTPATIENT)
Dept: PERIOP | Facility: HOSPITAL | Age: 76
End: 2021-12-27

## 2021-12-27 LAB
ALBUMIN SERPL-MCNC: 3.4 G/DL (ref 3.5–5.2)
ALBUMIN/GLOB SERPL: 1.5 G/DL
ALP SERPL-CCNC: 117 U/L (ref 39–117)
ALT SERPL W P-5'-P-CCNC: 13 U/L (ref 1–33)
ANION GAP SERPL CALCULATED.3IONS-SCNC: 18.8 MMOL/L (ref 5–15)
AST SERPL-CCNC: 16 U/L (ref 1–32)
BASOPHILS # BLD AUTO: 0.07 10*3/MM3 (ref 0–0.2)
BASOPHILS NFR BLD AUTO: 0.9 % (ref 0–1.5)
BILIRUB SERPL-MCNC: 0.4 MG/DL (ref 0–1.2)
BUN SERPL-MCNC: 65 MG/DL (ref 8–23)
BUN/CREAT SERPL: 5.3 (ref 7–25)
CALCIUM SPEC-SCNC: 9.1 MG/DL (ref 8.6–10.5)
CHLORIDE SERPL-SCNC: 97 MMOL/L (ref 98–107)
CHOLEST SERPL-MCNC: 114 MG/DL (ref 0–200)
CO2 SERPL-SCNC: 24.2 MMOL/L (ref 22–29)
CREAT SERPL-MCNC: 12.34 MG/DL (ref 0.57–1)
DEPRECATED RDW RBC AUTO: 51.7 FL (ref 37–54)
EOSINOPHIL # BLD AUTO: 0.55 10*3/MM3 (ref 0–0.4)
EOSINOPHIL NFR BLD AUTO: 7.1 % (ref 0.3–6.2)
ERYTHROCYTE [DISTWIDTH] IN BLOOD BY AUTOMATED COUNT: 14.7 % (ref 12.3–15.4)
GFR SERPL CREATININE-BSD FRML MDRD: 3 ML/MIN/1.73
GFR SERPL CREATININE-BSD FRML MDRD: ABNORMAL ML/MIN/{1.73_M2}
GLOBULIN UR ELPH-MCNC: 2.3 GM/DL
GLUCOSE SERPL-MCNC: 102 MG/DL (ref 65–99)
HCT VFR BLD AUTO: 28 % (ref 34–46.6)
HDLC SERPL-MCNC: 58 MG/DL (ref 40–60)
HGB BLD-MCNC: 9.6 G/DL (ref 12–15.9)
IMM GRANULOCYTES # BLD AUTO: 0.04 10*3/MM3 (ref 0–0.05)
IMM GRANULOCYTES NFR BLD AUTO: 0.5 % (ref 0–0.5)
LDLC SERPL CALC-MCNC: 40 MG/DL (ref 0–100)
LDLC/HDLC SERPL: 0.7 {RATIO}
LYMPHOCYTES # BLD AUTO: 1.37 10*3/MM3 (ref 0.7–3.1)
LYMPHOCYTES NFR BLD AUTO: 17.7 % (ref 19.6–45.3)
MCH RBC QN AUTO: 33.2 PG (ref 26.6–33)
MCHC RBC AUTO-ENTMCNC: 34.3 G/DL (ref 31.5–35.7)
MCV RBC AUTO: 96.9 FL (ref 79–97)
MONOCYTES # BLD AUTO: 0.49 10*3/MM3 (ref 0.1–0.9)
MONOCYTES NFR BLD AUTO: 6.3 % (ref 5–12)
NEUTROPHILS NFR BLD AUTO: 5.24 10*3/MM3 (ref 1.7–7)
NEUTROPHILS NFR BLD AUTO: 67.5 % (ref 42.7–76)
NRBC BLD AUTO-RTO: 0.1 /100 WBC (ref 0–0.2)
NT-PROBNP SERPL-MCNC: 5022 PG/ML (ref 0–1800)
PHOSPHATE SERPL-MCNC: 7.6 MG/DL (ref 2.5–4.5)
PLATELET # BLD AUTO: 246 10*3/MM3 (ref 140–450)
PMV BLD AUTO: 9.8 FL (ref 6–12)
POTASSIUM SERPL-SCNC: 5 MMOL/L (ref 3.5–5.2)
PROT SERPL-MCNC: 5.7 G/DL (ref 6–8.5)
RBC # BLD AUTO: 2.89 10*6/MM3 (ref 3.77–5.28)
SODIUM SERPL-SCNC: 140 MMOL/L (ref 136–145)
TRIGL SERPL-MCNC: 78 MG/DL (ref 0–150)
TSH SERPL DL<=0.05 MIU/L-ACNC: 6.48 UIU/ML (ref 0.27–4.2)
VLDLC SERPL-MCNC: 16 MG/DL (ref 5–40)
WBC NRBC COR # BLD: 7.76 10*3/MM3 (ref 3.4–10.8)

## 2021-12-27 PROCEDURE — 25010000002 NEOSTIGMINE 5 MG/10ML SOLUTION

## 2021-12-27 PROCEDURE — 99202 OFFICE O/P NEW SF 15 MIN: CPT | Performed by: SURGERY

## 2021-12-27 PROCEDURE — 49324 LAP INSERT TUNNEL IP CATH: CPT

## 2021-12-27 PROCEDURE — 63710000001 CALCITRIOL 0.25 MCG CAPSULE: Performed by: SURGERY

## 2021-12-27 PROCEDURE — 25010000002 ONDANSETRON PER 1 MG

## 2021-12-27 PROCEDURE — 49324 LAP INSERT TUNNEL IP CATH: CPT | Performed by: SURGERY

## 2021-12-27 PROCEDURE — A9270 NON-COVERED ITEM OR SERVICE: HCPCS | Performed by: SURGERY

## 2021-12-27 PROCEDURE — 63710000001 FAMOTIDINE 20 MG TABLET: Performed by: SURGERY

## 2021-12-27 PROCEDURE — 84100 ASSAY OF PHOSPHORUS: CPT | Performed by: INTERNAL MEDICINE

## 2021-12-27 PROCEDURE — 80053 COMPREHEN METABOLIC PANEL: CPT | Performed by: HOSPITALIST

## 2021-12-27 PROCEDURE — 63710000001 ATORVASTATIN 20 MG TABLET: Performed by: HOSPITALIST

## 2021-12-27 PROCEDURE — A9270 NON-COVERED ITEM OR SERVICE: HCPCS | Performed by: HOSPITALIST

## 2021-12-27 PROCEDURE — C1750 CATH, HEMODIALYSIS,LONG-TERM: HCPCS | Performed by: SURGERY

## 2021-12-27 PROCEDURE — 0 CEFAZOLIN IN DEXTROSE 2-4 GM/100ML-% SOLUTION: Performed by: SURGERY

## 2021-12-27 PROCEDURE — 25010000002 PHENYLEPHRINE 10 MG/ML SOLUTION

## 2021-12-27 PROCEDURE — 80061 LIPID PANEL: CPT | Performed by: HOSPITALIST

## 2021-12-27 PROCEDURE — 49422 REMOVE TUNNELED IP CATH: CPT | Performed by: SURGERY

## 2021-12-27 PROCEDURE — 25010000002 PROPOFOL 10 MG/ML EMULSION

## 2021-12-27 PROCEDURE — G0378 HOSPITAL OBSERVATION PER HR: HCPCS

## 2021-12-27 PROCEDURE — 84443 ASSAY THYROID STIM HORMONE: CPT | Performed by: HOSPITALIST

## 2021-12-27 PROCEDURE — 85025 COMPLETE CBC W/AUTO DIFF WBC: CPT | Performed by: HOSPITALIST

## 2021-12-27 PROCEDURE — 25010000002 HYDROMORPHONE 1 MG/ML SOLUTION

## 2021-12-27 PROCEDURE — 25010000002 ONDANSETRON PER 1 MG: Performed by: SURGERY

## 2021-12-27 PROCEDURE — 25010000002 DEXAMETHASONE PER 1 MG

## 2021-12-27 PROCEDURE — 63710000001: Performed by: SURGERY

## 2021-12-27 PROCEDURE — 83880 ASSAY OF NATRIURETIC PEPTIDE: CPT | Performed by: HOSPITALIST

## 2021-12-27 PROCEDURE — 63710000001 AMLODIPINE 5 MG TABLET: Performed by: SURGERY

## 2021-12-27 DEVICE — IMPLANTABLE DEVICE: Type: IMPLANTABLE DEVICE | Site: ABDOMEN | Status: FUNCTIONAL

## 2021-12-27 RX ORDER — ATORVASTATIN CALCIUM 20 MG/1
10 TABLET, FILM COATED ORAL NIGHTLY
Status: DISCONTINUED | OUTPATIENT
Start: 2021-12-27 | End: 2021-12-28 | Stop reason: HOSPADM

## 2021-12-27 RX ORDER — ONDANSETRON 2 MG/ML
4 INJECTION INTRAMUSCULAR; INTRAVENOUS EVERY 6 HOURS PRN
Status: DISCONTINUED | OUTPATIENT
Start: 2021-12-27 | End: 2021-12-28

## 2021-12-27 RX ORDER — NALOXONE HCL 0.4 MG/ML
0.2 VIAL (ML) INJECTION AS NEEDED
Status: DISCONTINUED | OUTPATIENT
Start: 2021-12-27 | End: 2021-12-27

## 2021-12-27 RX ORDER — ONDANSETRON 2 MG/ML
4 INJECTION INTRAMUSCULAR; INTRAVENOUS ONCE AS NEEDED
Status: DISCONTINUED | OUTPATIENT
Start: 2021-12-27 | End: 2021-12-27

## 2021-12-27 RX ORDER — PROPOFOL 10 MG/ML
VIAL (ML) INTRAVENOUS AS NEEDED
Status: DISCONTINUED | OUTPATIENT
Start: 2021-12-27 | End: 2021-12-27 | Stop reason: SURG

## 2021-12-27 RX ORDER — DEXAMETHASONE SODIUM PHOSPHATE 10 MG/ML
INJECTION INTRAMUSCULAR; INTRAVENOUS AS NEEDED
Status: DISCONTINUED | OUTPATIENT
Start: 2021-12-27 | End: 2021-12-27 | Stop reason: SURG

## 2021-12-27 RX ORDER — SODIUM CHLORIDE 9 MG/ML
9 INJECTION, SOLUTION INTRAVENOUS CONTINUOUS
Status: DISCONTINUED | OUTPATIENT
Start: 2021-12-27 | End: 2021-12-28

## 2021-12-27 RX ORDER — LIDOCAINE HYDROCHLORIDE 10 MG/ML
0.5 INJECTION, SOLUTION EPIDURAL; INFILTRATION; INTRACAUDAL; PERINEURAL ONCE AS NEEDED
Status: DISCONTINUED | OUTPATIENT
Start: 2021-12-27 | End: 2021-12-27 | Stop reason: HOSPADM

## 2021-12-27 RX ORDER — HYDRALAZINE HYDROCHLORIDE 20 MG/ML
5 INJECTION INTRAMUSCULAR; INTRAVENOUS
Status: DISCONTINUED | OUTPATIENT
Start: 2021-12-27 | End: 2021-12-27

## 2021-12-27 RX ORDER — IBUPROFEN 600 MG/1
600 TABLET ORAL ONCE AS NEEDED
Status: DISCONTINUED | OUTPATIENT
Start: 2021-12-27 | End: 2021-12-27

## 2021-12-27 RX ORDER — FAMOTIDINE 10 MG/ML
20 INJECTION, SOLUTION INTRAVENOUS ONCE
Status: COMPLETED | OUTPATIENT
Start: 2021-12-27 | End: 2021-12-27

## 2021-12-27 RX ORDER — ROCURONIUM BROMIDE 10 MG/ML
INJECTION, SOLUTION INTRAVENOUS AS NEEDED
Status: DISCONTINUED | OUTPATIENT
Start: 2021-12-27 | End: 2021-12-27 | Stop reason: SURG

## 2021-12-27 RX ORDER — PHENYLEPHRINE HYDROCHLORIDE 10 MG/ML
INJECTION INTRAVENOUS AS NEEDED
Status: DISCONTINUED | OUTPATIENT
Start: 2021-12-27 | End: 2021-12-27 | Stop reason: SURG

## 2021-12-27 RX ORDER — NITROGLYCERIN 0.4 MG/1
0.4 TABLET SUBLINGUAL
Status: DISCONTINUED | OUTPATIENT
Start: 2021-12-27 | End: 2021-12-28

## 2021-12-27 RX ORDER — EPHEDRINE SULFATE 50 MG/ML
5 INJECTION, SOLUTION INTRAVENOUS ONCE AS NEEDED
Status: DISCONTINUED | OUTPATIENT
Start: 2021-12-27 | End: 2021-12-27

## 2021-12-27 RX ORDER — ONDANSETRON 2 MG/ML
INJECTION INTRAMUSCULAR; INTRAVENOUS AS NEEDED
Status: DISCONTINUED | OUTPATIENT
Start: 2021-12-27 | End: 2021-12-27 | Stop reason: SURG

## 2021-12-27 RX ORDER — ESMOLOL HYDROCHLORIDE 10 MG/ML
INJECTION INTRAVENOUS AS NEEDED
Status: DISCONTINUED | OUTPATIENT
Start: 2021-12-27 | End: 2021-12-27 | Stop reason: SURG

## 2021-12-27 RX ORDER — SODIUM CHLORIDE 0.9 % (FLUSH) 0.9 %
3-10 SYRINGE (ML) INJECTION AS NEEDED
Status: DISCONTINUED | OUTPATIENT
Start: 2021-12-27 | End: 2021-12-27 | Stop reason: HOSPADM

## 2021-12-27 RX ORDER — DIPHENHYDRAMINE HYDROCHLORIDE 50 MG/ML
12.5 INJECTION INTRAMUSCULAR; INTRAVENOUS
Status: DISCONTINUED | OUTPATIENT
Start: 2021-12-27 | End: 2021-12-27

## 2021-12-27 RX ORDER — HYDROCODONE BITARTRATE AND ACETAMINOPHEN 5; 325 MG/1; MG/1
2 TABLET ORAL EVERY 4 HOURS PRN
Status: DISCONTINUED | OUTPATIENT
Start: 2021-12-27 | End: 2021-12-28

## 2021-12-27 RX ORDER — SODIUM CHLORIDE 0.9 % (FLUSH) 0.9 %
3 SYRINGE (ML) INJECTION EVERY 12 HOURS SCHEDULED
Status: DISCONTINUED | OUTPATIENT
Start: 2021-12-27 | End: 2021-12-27 | Stop reason: HOSPADM

## 2021-12-27 RX ORDER — OXYCODONE AND ACETAMINOPHEN 7.5; 325 MG/1; MG/1
1 TABLET ORAL EVERY 4 HOURS PRN
Status: DISCONTINUED | OUTPATIENT
Start: 2021-12-27 | End: 2021-12-27

## 2021-12-27 RX ORDER — GLYCOPYRROLATE 0.2 MG/ML
INJECTION INTRAMUSCULAR; INTRAVENOUS AS NEEDED
Status: DISCONTINUED | OUTPATIENT
Start: 2021-12-27 | End: 2021-12-27 | Stop reason: SURG

## 2021-12-27 RX ORDER — HYDROMORPHONE HYDROCHLORIDE 1 MG/ML
0.5 INJECTION, SOLUTION INTRAMUSCULAR; INTRAVENOUS; SUBCUTANEOUS
Status: DISCONTINUED | OUTPATIENT
Start: 2021-12-27 | End: 2021-12-27

## 2021-12-27 RX ORDER — LABETALOL HYDROCHLORIDE 5 MG/ML
5 INJECTION, SOLUTION INTRAVENOUS
Status: DISCONTINUED | OUTPATIENT
Start: 2021-12-27 | End: 2021-12-27

## 2021-12-27 RX ORDER — PROMETHAZINE HYDROCHLORIDE 25 MG/1
25 TABLET ORAL ONCE AS NEEDED
Status: DISCONTINUED | OUTPATIENT
Start: 2021-12-27 | End: 2021-12-27

## 2021-12-27 RX ORDER — SODIUM CHLORIDE, SODIUM LACTATE, POTASSIUM CHLORIDE, CALCIUM CHLORIDE 600; 310; 30; 20 MG/100ML; MG/100ML; MG/100ML; MG/100ML
9 INJECTION, SOLUTION INTRAVENOUS CONTINUOUS
Status: DISCONTINUED | OUTPATIENT
Start: 2021-12-27 | End: 2021-12-27

## 2021-12-27 RX ORDER — DIPHENHYDRAMINE HCL 25 MG
25 CAPSULE ORAL
Status: DISCONTINUED | OUTPATIENT
Start: 2021-12-27 | End: 2021-12-27

## 2021-12-27 RX ORDER — NEOSTIGMINE METHYLSULFATE 0.5 MG/ML
INJECTION, SOLUTION INTRAVENOUS AS NEEDED
Status: DISCONTINUED | OUTPATIENT
Start: 2021-12-27 | End: 2021-12-27 | Stop reason: SURG

## 2021-12-27 RX ORDER — BUPIVACAINE HYDROCHLORIDE AND EPINEPHRINE 5; 5 MG/ML; UG/ML
INJECTION, SOLUTION EPIDURAL; INTRACAUDAL; PERINEURAL AS NEEDED
Status: DISCONTINUED | OUTPATIENT
Start: 2021-12-27 | End: 2021-12-27 | Stop reason: HOSPADM

## 2021-12-27 RX ORDER — HYDROCODONE BITARTRATE AND ACETAMINOPHEN 7.5; 325 MG/1; MG/1
1 TABLET ORAL ONCE AS NEEDED
Status: DISCONTINUED | OUTPATIENT
Start: 2021-12-27 | End: 2021-12-27

## 2021-12-27 RX ORDER — MAGNESIUM HYDROXIDE 1200 MG/15ML
LIQUID ORAL AS NEEDED
Status: DISCONTINUED | OUTPATIENT
Start: 2021-12-27 | End: 2021-12-27 | Stop reason: HOSPADM

## 2021-12-27 RX ORDER — FLUMAZENIL 0.1 MG/ML
0.2 INJECTION INTRAVENOUS AS NEEDED
Status: DISCONTINUED | OUTPATIENT
Start: 2021-12-27 | End: 2021-12-27

## 2021-12-27 RX ORDER — LIDOCAINE HYDROCHLORIDE 20 MG/ML
INJECTION, SOLUTION INFILTRATION; PERINEURAL AS NEEDED
Status: DISCONTINUED | OUTPATIENT
Start: 2021-12-27 | End: 2021-12-27 | Stop reason: SURG

## 2021-12-27 RX ORDER — HYDROCODONE BITARTRATE AND ACETAMINOPHEN 5; 325 MG/1; MG/1
1 TABLET ORAL EVERY 4 HOURS PRN
Status: DISCONTINUED | OUTPATIENT
Start: 2021-12-27 | End: 2021-12-28

## 2021-12-27 RX ORDER — CEFAZOLIN SODIUM 2 G/100ML
2 INJECTION, SOLUTION INTRAVENOUS ONCE
Status: COMPLETED | OUTPATIENT
Start: 2021-12-27 | End: 2021-12-27

## 2021-12-27 RX ORDER — FENTANYL CITRATE 50 UG/ML
50 INJECTION, SOLUTION INTRAMUSCULAR; INTRAVENOUS
Status: DISCONTINUED | OUTPATIENT
Start: 2021-12-27 | End: 2021-12-27

## 2021-12-27 RX ORDER — BACITRACIN ZINC 500 [USP'U]/G
OINTMENT TOPICAL AS NEEDED
Status: DISCONTINUED | OUTPATIENT
Start: 2021-12-27 | End: 2021-12-27 | Stop reason: HOSPADM

## 2021-12-27 RX ORDER — PROMETHAZINE HYDROCHLORIDE 25 MG/1
25 SUPPOSITORY RECTAL ONCE AS NEEDED
Status: DISCONTINUED | OUTPATIENT
Start: 2021-12-27 | End: 2021-12-27

## 2021-12-27 RX ADMIN — PROPOFOL 50 MG: 10 INJECTION, EMULSION INTRAVENOUS at 11:21

## 2021-12-27 RX ADMIN — HYDROMORPHONE HYDROCHLORIDE 0.25 MG: 1 INJECTION, SOLUTION INTRAMUSCULAR; INTRAVENOUS; SUBCUTANEOUS at 11:48

## 2021-12-27 RX ADMIN — LIDOCAINE HYDROCHLORIDE 50 MG: 20 INJECTION, SOLUTION INFILTRATION; PERINEURAL at 11:12

## 2021-12-27 RX ADMIN — ONDANSETRON 4 MG: 2 INJECTION INTRAMUSCULAR; INTRAVENOUS at 15:50

## 2021-12-27 RX ADMIN — FAMOTIDINE 20 MG: 10 INJECTION INTRAVENOUS at 09:54

## 2021-12-27 RX ADMIN — ESMOLOL HYDROCHLORIDE 10 MG: 100 INJECTION, SOLUTION INTRAVENOUS at 11:31

## 2021-12-27 RX ADMIN — AMLODIPINE BESYLATE 5 MG: 5 TABLET ORAL at 17:59

## 2021-12-27 RX ADMIN — NEOSTIGMINE METHYLSULFATE 3 MG: 0.5 INJECTION INTRAVENOUS at 11:47

## 2021-12-27 RX ADMIN — DEXAMETHASONE SODIUM PHOSPHATE 8 MG: 10 INJECTION INTRAMUSCULAR; INTRAVENOUS at 11:20

## 2021-12-27 RX ADMIN — CALCITRIOL 0.25 MCG: 0.25 CAPSULE ORAL at 17:59

## 2021-12-27 RX ADMIN — FAMOTIDINE 20 MG: 20 TABLET, FILM COATED ORAL at 20:54

## 2021-12-27 RX ADMIN — CEFAZOLIN SODIUM 2 G: 2 INJECTION, SOLUTION INTRAVENOUS at 10:58

## 2021-12-27 RX ADMIN — PROPOFOL 50 MG: 10 INJECTION, EMULSION INTRAVENOUS at 11:28

## 2021-12-27 RX ADMIN — ATORVASTATIN CALCIUM 10 MG: 20 TABLET, FILM COATED ORAL at 20:54

## 2021-12-27 RX ADMIN — ONDANSETRON 4 MG: 2 INJECTION INTRAMUSCULAR; INTRAVENOUS at 11:45

## 2021-12-27 RX ADMIN — SODIUM CHLORIDE 9 ML/HR: 9 INJECTION, SOLUTION INTRAVENOUS at 09:51

## 2021-12-27 RX ADMIN — HYDROMORPHONE HYDROCHLORIDE 0.25 MG: 1 INJECTION, SOLUTION INTRAMUSCULAR; INTRAVENOUS; SUBCUTANEOUS at 11:34

## 2021-12-27 RX ADMIN — PHENYLEPHRINE HYDROCHLORIDE 100 MCG: 10 INJECTION, SOLUTION INTRAVENOUS at 11:44

## 2021-12-27 RX ADMIN — ROCURONIUM BROMIDE 30 MG: 50 INJECTION INTRAVENOUS at 11:12

## 2021-12-27 RX ADMIN — GLYCOPYRROLATE 0.5 MG: 0.2 INJECTION INTRAMUSCULAR; INTRAVENOUS at 11:47

## 2021-12-27 RX ADMIN — PROPOFOL 100 MG: 10 INJECTION, EMULSION INTRAVENOUS at 11:12

## 2021-12-27 NOTE — ANESTHESIA PROCEDURE NOTES
Airway  Urgency: elective    Date/Time: 12/27/2021 11:14 AM  Airway not difficult    General Information and Staff    Patient location during procedure: OR  Anesthesiologist: See Staples MD  CRNA: Senia Figueroa CRNA    Indications and Patient Condition  Indications for airway management: airway protection    Preoxygenated: yes  MILS not maintained throughout  Mask difficulty assessment: 1 - vent by mask    Final Airway Details  Final airway type: endotracheal airway      Successful airway: ETT  Cuffed: yes   Successful intubation technique: direct laryngoscopy  Facilitating devices/methods: intubating stylet  Blade: Nilam  Blade size: 3  ETT size (mm): 7.0  Cormack-Lehane Classification: grade IIa - partial view of glottis  Placement verified by: chest auscultation and capnometry   Measured from: lips  ETT/EBT  to lips (cm): 20  Number of attempts at approach: 1  Assessment: lips, teeth, and gum same as pre-op and atraumatic intubation

## 2021-12-27 NOTE — ANESTHESIA PREPROCEDURE EVALUATION
Anesthesia Evaluation     Patient summary reviewed and Nursing notes reviewed   history of anesthetic complications (low pseudocholinesterase, no anectine ):  NPO Solid Status: > 8 hours             Airway   Mallampati: II  TM distance: >3 FB  Neck ROM: full  no difficulty expected  Dental - normal exam     Pulmonary - normal exam   Cardiovascular - normal exam    (+) hypertension, hyperlipidemia,       Neuro/Psych  GI/Hepatic/Renal/Endo    (+)   renal disease dialysis and ESRD,     Musculoskeletal     Abdominal  - normal exam   Substance History      OB/GYN          Other      history of cancer remission                  Anesthesia Plan    ASA 4     general     intravenous induction     Anesthetic plan, all risks, benefits, and alternatives have been provided, discussed and informed consent has been obtained with: patient.

## 2021-12-27 NOTE — ANESTHESIA POSTPROCEDURE EVALUATION
Patient: Fany Todd    Procedure Summary     Date: 12/27/21 Room / Location: [facility] OR  / [facility] MAIN OR    Anesthesia Start: 1102 Anesthesia Stop: 1202    Procedure: REMOVAL AND PLACEMENT OF PERITONEAL DIALYSIS CATHETER LAPAROSCOPIC (N/A Abdomen) Diagnosis:     Surgeons: Pablo Severino MD Provider: See Staples MD    Anesthesia Type: general ASA Status: 4          Anesthesia Type: general    Vitals  Vitals Value Taken Time   /66 12/27/21 1246   Temp 36.4 °C (97.6 °F) 12/27/21 1200   Pulse 75 12/27/21 1258   Resp 14 12/27/21 1245   SpO2 97 % 12/27/21 1258   Vitals shown include unvalidated device data.        Post Anesthesia Care and Evaluation    Patient location during evaluation: PACU  Patient participation: complete - patient participated  Level of consciousness: awake and alert  Pain management: adequate  Airway patency: patent  Anesthetic complications: No anesthetic complications  PONV Status: none  Cardiovascular status: acceptable and hemodynamically stable  Respiratory status: acceptable, nonlabored ventilation and spontaneous ventilation  Hydration status: acceptable

## 2021-12-28 ENCOUNTER — READMISSION MANAGEMENT (OUTPATIENT)
Dept: CALL CENTER | Facility: HOSPITAL | Age: 76
End: 2021-12-28

## 2021-12-28 VITALS
RESPIRATION RATE: 16 BRPM | HEIGHT: 63 IN | DIASTOLIC BLOOD PRESSURE: 57 MMHG | BODY MASS INDEX: 18.91 KG/M2 | HEART RATE: 85 BPM | WEIGHT: 106.7 LBS | TEMPERATURE: 97.7 F | OXYGEN SATURATION: 91 % | SYSTOLIC BLOOD PRESSURE: 123 MMHG

## 2021-12-28 LAB
ALBUMIN SERPL-MCNC: 2.7 G/DL (ref 3.5–5.2)
ANION GAP SERPL CALCULATED.3IONS-SCNC: 20.4 MMOL/L (ref 5–15)
BASOPHILS # BLD AUTO: 0.01 10*3/MM3 (ref 0–0.2)
BASOPHILS NFR BLD AUTO: 0.1 % (ref 0–1.5)
BUN SERPL-MCNC: 75 MG/DL (ref 8–23)
BUN/CREAT SERPL: 5.2 (ref 7–25)
CALCIUM SPEC-SCNC: 8.3 MG/DL (ref 8.6–10.5)
CHLORIDE SERPL-SCNC: 96 MMOL/L (ref 98–107)
CO2 SERPL-SCNC: 21.6 MMOL/L (ref 22–29)
CREAT SERPL-MCNC: 14.4 MG/DL (ref 0.57–1)
DEPRECATED RDW RBC AUTO: 51.5 FL (ref 37–54)
EOSINOPHIL # BLD AUTO: 0 10*3/MM3 (ref 0–0.4)
EOSINOPHIL NFR BLD AUTO: 0 % (ref 0.3–6.2)
ERYTHROCYTE [DISTWIDTH] IN BLOOD BY AUTOMATED COUNT: 14.5 % (ref 12.3–15.4)
GFR SERPL CREATININE-BSD FRML MDRD: 2 ML/MIN/1.73
GFR SERPL CREATININE-BSD FRML MDRD: ABNORMAL ML/MIN/{1.73_M2}
GLUCOSE SERPL-MCNC: 100 MG/DL (ref 65–99)
HCT VFR BLD AUTO: 23.9 % (ref 34–46.6)
HGB BLD-MCNC: 8.1 G/DL (ref 12–15.9)
IMM GRANULOCYTES # BLD AUTO: 0.05 10*3/MM3 (ref 0–0.05)
IMM GRANULOCYTES NFR BLD AUTO: 0.6 % (ref 0–0.5)
LYMPHOCYTES # BLD AUTO: 0.39 10*3/MM3 (ref 0.7–3.1)
LYMPHOCYTES NFR BLD AUTO: 4.7 % (ref 19.6–45.3)
MCH RBC QN AUTO: 33.1 PG (ref 26.6–33)
MCHC RBC AUTO-ENTMCNC: 33.9 G/DL (ref 31.5–35.7)
MCV RBC AUTO: 97.6 FL (ref 79–97)
MONOCYTES # BLD AUTO: 0.47 10*3/MM3 (ref 0.1–0.9)
MONOCYTES NFR BLD AUTO: 5.6 % (ref 5–12)
NEUTROPHILS NFR BLD AUTO: 7.41 10*3/MM3 (ref 1.7–7)
NEUTROPHILS NFR BLD AUTO: 89 % (ref 42.7–76)
NRBC BLD AUTO-RTO: 0 /100 WBC (ref 0–0.2)
PHOSPHATE SERPL-MCNC: 9.7 MG/DL (ref 2.5–4.5)
PLATELET # BLD AUTO: 214 10*3/MM3 (ref 140–450)
PMV BLD AUTO: 10.3 FL (ref 6–12)
POTASSIUM SERPL-SCNC: 4.5 MMOL/L (ref 3.5–5.2)
POTASSIUM SERPL-SCNC: 6.6 MMOL/L (ref 3.5–5.2)
QT INTERVAL: 395 MS
RBC # BLD AUTO: 2.45 10*6/MM3 (ref 3.77–5.28)
SODIUM SERPL-SCNC: 138 MMOL/L (ref 136–145)
T4 FREE SERPL-MCNC: 1.26 NG/DL (ref 0.93–1.7)
WBC NRBC COR # BLD: 8.33 10*3/MM3 (ref 3.4–10.8)

## 2021-12-28 PROCEDURE — A9270 NON-COVERED ITEM OR SERVICE: HCPCS | Performed by: SURGERY

## 2021-12-28 PROCEDURE — 63710000001 AMLODIPINE 5 MG TABLET: Performed by: SURGERY

## 2021-12-28 PROCEDURE — 93005 ELECTROCARDIOGRAM TRACING: CPT | Performed by: INTERNAL MEDICINE

## 2021-12-28 PROCEDURE — 84439 ASSAY OF FREE THYROXINE: CPT | Performed by: HOSPITALIST

## 2021-12-28 PROCEDURE — A9270 NON-COVERED ITEM OR SERVICE: HCPCS | Performed by: INTERNAL MEDICINE

## 2021-12-28 PROCEDURE — 63710000001 INSULIN REGULAR HUMAN PER 5 UNITS: Performed by: INTERNAL MEDICINE

## 2021-12-28 PROCEDURE — 94640 AIRWAY INHALATION TREATMENT: CPT

## 2021-12-28 PROCEDURE — 63710000001 CHOLECALCIFEROL 25 MCG (1000 UT) TABLET: Performed by: SURGERY

## 2021-12-28 PROCEDURE — 94760 N-INVAS EAR/PLS OXIMETRY 1: CPT

## 2021-12-28 PROCEDURE — 63710000001 SODIUM ZIRCONIUM CYCLOSILICATE 10 G PACK: Performed by: INTERNAL MEDICINE

## 2021-12-28 PROCEDURE — 80069 RENAL FUNCTION PANEL: CPT | Performed by: SURGERY

## 2021-12-28 PROCEDURE — 94799 UNLISTED PULMONARY SVC/PX: CPT

## 2021-12-28 PROCEDURE — 93010 ELECTROCARDIOGRAM REPORT: CPT | Performed by: INTERNAL MEDICINE

## 2021-12-28 PROCEDURE — 63710000001 CALCITRIOL 0.25 MCG CAPSULE: Performed by: SURGERY

## 2021-12-28 PROCEDURE — G0378 HOSPITAL OBSERVATION PER HR: HCPCS

## 2021-12-28 PROCEDURE — 63710000001 HYDROCODONE-ACETAMINOPHEN 5-325 MG TABLET: Performed by: SURGERY

## 2021-12-28 PROCEDURE — 85025 COMPLETE CBC W/AUTO DIFF WBC: CPT | Performed by: SURGERY

## 2021-12-28 PROCEDURE — 84132 ASSAY OF SERUM POTASSIUM: CPT | Performed by: INTERNAL MEDICINE

## 2021-12-28 RX ORDER — FAMOTIDINE 20 MG/1
20 TABLET, FILM COATED ORAL 2 TIMES DAILY
Qty: 60 TABLET | Refills: 0 | Status: SHIPPED | OUTPATIENT
Start: 2021-12-28 | End: 2022-01-16 | Stop reason: HOSPADM

## 2021-12-28 RX ORDER — DEXTROSE MONOHYDRATE 25 G/50ML
50 INJECTION, SOLUTION INTRAVENOUS ONCE
Status: COMPLETED | OUTPATIENT
Start: 2021-12-28 | End: 2021-12-28

## 2021-12-28 RX ADMIN — INSULIN HUMAN 10 UNITS: 100 INJECTION, SOLUTION PARENTERAL at 10:44

## 2021-12-28 RX ADMIN — AMLODIPINE BESYLATE 5 MG: 5 TABLET ORAL at 09:30

## 2021-12-28 RX ADMIN — HYDROCODONE BITARTRATE AND ACETAMINOPHEN 1 TABLET: 5; 325 TABLET ORAL at 01:07

## 2021-12-28 RX ADMIN — CALCITRIOL 0.25 MCG: 0.25 CAPSULE ORAL at 09:30

## 2021-12-28 RX ADMIN — Medication 1000 UNITS: at 09:31

## 2021-12-28 RX ADMIN — ALBUTEROL SULFATE 10 MG: 2.5 SOLUTION RESPIRATORY (INHALATION) at 10:50

## 2021-12-28 RX ADMIN — SODIUM ZIRCONIUM CYCLOSILICATE 20 G: 10 POWDER, FOR SUSPENSION ORAL at 10:44

## 2021-12-28 RX ADMIN — DEXTROSE MONOHYDRATE 50 ML: 25 INJECTION, SOLUTION INTRAVENOUS at 10:44

## 2022-01-03 ENCOUNTER — READMISSION MANAGEMENT (OUTPATIENT)
Dept: CALL CENTER | Facility: HOSPITAL | Age: 77
End: 2022-01-03

## 2022-01-03 NOTE — OUTREACH NOTE
General Surgery Week 1 Survey      Responses   [facility] patient discharged from? [place]   Does the patient have one of the following disease processes/diagnoses(primary or secondary)? General Surgery   Week 1 attempt successful? No   Unsuccessful attempts Attempt 1          Melissa Gutierrez RN

## 2022-01-07 ENCOUNTER — READMISSION MANAGEMENT (OUTPATIENT)
Dept: CALL CENTER | Facility: HOSPITAL | Age: 77
End: 2022-01-07

## 2022-01-07 NOTE — OUTREACH NOTE
General Surgery Week 2 Survey      Responses   [facility] patient discharged from? [place]   Does the patient have one of the following disease processes/diagnoses(primary or secondary)? General Surgery   Week 2 attempt successful? No   Unsuccessful attempts Attempt 1          Silke Keith RN

## 2022-01-10 ENCOUNTER — HOSPITAL ENCOUNTER (INPATIENT)
Facility: HOSPITAL | Age: 77
LOS: 6 days | Discharge: HOME | End: 2022-01-16
Attending: EMERGENCY MEDICINE | Admitting: HOSPITALIST

## 2022-01-10 ENCOUNTER — APPOINTMENT (OUTPATIENT)
Dept: GENERAL RADIOLOGY | Facility: HOSPITAL | Age: 77
End: 2022-01-10

## 2022-01-10 ENCOUNTER — APPOINTMENT (OUTPATIENT)
Dept: CT IMAGING | Facility: HOSPITAL | Age: 77
End: 2022-01-10

## 2022-01-10 DIAGNOSIS — Z99.2 ESRD ON PERITONEAL DIALYSIS: ICD-10-CM

## 2022-01-10 DIAGNOSIS — R19.5 HEME POSITIVE STOOL: ICD-10-CM

## 2022-01-10 DIAGNOSIS — R55 SYNCOPE AND COLLAPSE: ICD-10-CM

## 2022-01-10 DIAGNOSIS — R10.84 GENERALIZED ABDOMINAL PAIN: Primary | ICD-10-CM

## 2022-01-10 DIAGNOSIS — D64.9 ANEMIA, UNSPECIFIED TYPE: ICD-10-CM

## 2022-01-10 DIAGNOSIS — D72.829 LEUKOCYTOSIS, UNSPECIFIED TYPE: ICD-10-CM

## 2022-01-10 DIAGNOSIS — N18.6 ESRD ON PERITONEAL DIALYSIS: ICD-10-CM

## 2022-01-10 LAB
ALBUMIN SERPL-MCNC: 3.4 G/DL (ref 3.5–5.2)
ALBUMIN/GLOB SERPL: 1.4 G/DL
ALP SERPL-CCNC: 107 U/L (ref 39–117)
ALT SERPL W P-5'-P-CCNC: 7 U/L (ref 1–33)
ANION GAP SERPL CALCULATED.3IONS-SCNC: 21.3 MMOL/L (ref 5–15)
AST SERPL-CCNC: 13 U/L (ref 1–32)
BASOPHILS # BLD AUTO: 0.03 10*3/MM3 (ref 0–0.2)
BASOPHILS NFR BLD AUTO: 0.2 % (ref 0–1.5)
BILIRUB SERPL-MCNC: 0.2 MG/DL (ref 0–1.2)
BUN SERPL-MCNC: 78 MG/DL (ref 8–23)
BUN/CREAT SERPL: 6.4 (ref 7–25)
CALCIUM SPEC-SCNC: 8.7 MG/DL (ref 8.6–10.5)
CHLORIDE SERPL-SCNC: 91 MMOL/L (ref 98–107)
CO2 SERPL-SCNC: 24.7 MMOL/L (ref 22–29)
CREAT SERPL-MCNC: 12.19 MG/DL (ref 0.57–1)
DEPRECATED RDW RBC AUTO: 49.6 FL (ref 37–54)
EOSINOPHIL # BLD AUTO: 0.03 10*3/MM3 (ref 0–0.4)
EOSINOPHIL NFR BLD AUTO: 0.2 % (ref 0.3–6.2)
ERYTHROCYTE [DISTWIDTH] IN BLOOD BY AUTOMATED COUNT: 14.4 % (ref 12.3–15.4)
GFR SERPL CREATININE-BSD FRML MDRD: 3 ML/MIN/1.73
GFR SERPL CREATININE-BSD FRML MDRD: ABNORMAL ML/MIN/{1.73_M2}
GLOBULIN UR ELPH-MCNC: 2.5 GM/DL
GLUCOSE BLDC GLUCOMTR-MCNC: 155 MG/DL (ref 70–130)
GLUCOSE SERPL-MCNC: 132 MG/DL (ref 65–99)
HCT VFR BLD AUTO: 25.7 % (ref 34–46.6)
HGB BLD-MCNC: 8.8 G/DL (ref 12–15.9)
HOLD SPECIMEN: NORMAL
HOLD SPECIMEN: NORMAL
IMM GRANULOCYTES # BLD AUTO: 0.17 10*3/MM3 (ref 0–0.05)
IMM GRANULOCYTES NFR BLD AUTO: 1.2 % (ref 0–0.5)
LYMPHOCYTES # BLD AUTO: 0.75 10*3/MM3 (ref 0.7–3.1)
LYMPHOCYTES NFR BLD AUTO: 5.4 % (ref 19.6–45.3)
MAGNESIUM SERPL-MCNC: 1.8 MG/DL (ref 1.6–2.4)
MCH RBC QN AUTO: 32.7 PG (ref 26.6–33)
MCHC RBC AUTO-ENTMCNC: 34.2 G/DL (ref 31.5–35.7)
MCV RBC AUTO: 95.5 FL (ref 79–97)
MONOCYTES # BLD AUTO: 0.44 10*3/MM3 (ref 0.1–0.9)
MONOCYTES NFR BLD AUTO: 3.2 % (ref 5–12)
NEUTROPHILS NFR BLD AUTO: 12.47 10*3/MM3 (ref 1.7–7)
NEUTROPHILS NFR BLD AUTO: 89.8 % (ref 42.7–76)
NRBC BLD AUTO-RTO: 0 /100 WBC (ref 0–0.2)
PLATELET # BLD AUTO: 444 10*3/MM3 (ref 140–450)
PMV BLD AUTO: 10.4 FL (ref 6–12)
POTASSIUM SERPL-SCNC: 5.1 MMOL/L (ref 3.5–5.2)
PROT SERPL-MCNC: 5.9 G/DL (ref 6–8.5)
RBC # BLD AUTO: 2.69 10*6/MM3 (ref 3.77–5.28)
SODIUM SERPL-SCNC: 137 MMOL/L (ref 136–145)
TROPONIN T SERPL-MCNC: 0.06 NG/ML (ref 0–0.03)
WBC NRBC COR # BLD: 13.89 10*3/MM3 (ref 3.4–10.8)
WHOLE BLOOD HOLD SPECIMEN: NORMAL
WHOLE BLOOD HOLD SPECIMEN: NORMAL

## 2022-01-10 PROCEDURE — 25010000002 ONDANSETRON PER 1 MG: Performed by: EMERGENCY MEDICINE

## 2022-01-10 PROCEDURE — 82962 GLUCOSE BLOOD TEST: CPT

## 2022-01-10 PROCEDURE — 84484 ASSAY OF TROPONIN QUANT: CPT

## 2022-01-10 PROCEDURE — 93010 ELECTROCARDIOGRAM REPORT: CPT | Performed by: INTERNAL MEDICINE

## 2022-01-10 PROCEDURE — U0005 INFEC AGEN DETEC AMPLI PROBE: HCPCS | Performed by: EMERGENCY MEDICINE

## 2022-01-10 PROCEDURE — 85025 COMPLETE CBC W/AUTO DIFF WBC: CPT

## 2022-01-10 PROCEDURE — 83735 ASSAY OF MAGNESIUM: CPT

## 2022-01-10 PROCEDURE — 25010000002 CEFTRIAXONE PER 250 MG: Performed by: EMERGENCY MEDICINE

## 2022-01-10 PROCEDURE — 71045 X-RAY EXAM CHEST 1 VIEW: CPT

## 2022-01-10 PROCEDURE — 93005 ELECTROCARDIOGRAM TRACING: CPT | Performed by: EMERGENCY MEDICINE

## 2022-01-10 PROCEDURE — 99284 EMERGENCY DEPT VISIT MOD MDM: CPT

## 2022-01-10 PROCEDURE — 74176 CT ABD & PELVIS W/O CONTRAST: CPT

## 2022-01-10 PROCEDURE — 80053 COMPREHEN METABOLIC PANEL: CPT

## 2022-01-10 PROCEDURE — 25010000002 MORPHINE PER 10 MG: Performed by: EMERGENCY MEDICINE

## 2022-01-10 PROCEDURE — 70450 CT HEAD/BRAIN W/O DYE: CPT

## 2022-01-10 PROCEDURE — U0004 COV-19 TEST NON-CDC HGH THRU: HCPCS | Performed by: EMERGENCY MEDICINE

## 2022-01-10 RX ORDER — MORPHINE SULFATE 2 MG/ML
2 INJECTION, SOLUTION INTRAMUSCULAR; INTRAVENOUS ONCE
Status: COMPLETED | OUTPATIENT
Start: 2022-01-10 | End: 2022-01-10

## 2022-01-10 RX ORDER — ONDANSETRON 2 MG/ML
4 INJECTION INTRAMUSCULAR; INTRAVENOUS ONCE
Status: COMPLETED | OUTPATIENT
Start: 2022-01-10 | End: 2022-01-10

## 2022-01-10 RX ORDER — SODIUM CHLORIDE 0.9 % (FLUSH) 0.9 %
10 SYRINGE (ML) INJECTION AS NEEDED
Status: DISCONTINUED | OUTPATIENT
Start: 2022-01-10 | End: 2022-01-16

## 2022-01-10 RX ADMIN — SODIUM CHLORIDE 2 G: 9 INJECTION, SOLUTION INTRAVENOUS at 23:37

## 2022-01-10 RX ADMIN — MORPHINE SULFATE 2 MG: 2 INJECTION, SOLUTION INTRAMUSCULAR; INTRAVENOUS at 23:37

## 2022-01-10 RX ADMIN — ONDANSETRON 4 MG: 2 INJECTION INTRAMUSCULAR; INTRAVENOUS at 23:36

## 2022-01-10 NOTE — ED TRIAGE NOTES
Patient had a recent procedure with a dialysis catheter placed and since then has not been eating much. Patient reports she does not eat because her stomach pains are bad. She did have dialysis today.     Patient has on mask nurse has on proper ppe.

## 2022-01-11 ENCOUNTER — READMISSION MANAGEMENT (OUTPATIENT)
Dept: CALL CENTER | Facility: HOSPITAL | Age: 77
End: 2022-01-11

## 2022-01-11 LAB
APPEARANCE FLD: CLEAR
COLOR FLD: COLORLESS
GLUCOSE FLD-MCNC: 394 MG/DL
LYMPHOCYTES NFR FLD MANUAL: 26 %
METHOD: NORMAL
MONOCYTES NFR FLD: 6 %
MONOS+MACROS NFR FLD: 24 %
NEUTROPHILS NFR FLD MANUAL: 44 %
NUC CELL # FLD: 9 /MM3
PROT FLD-MCNC: <1 G/DL
QT INTERVAL: 339 MS
RBC # FLD AUTO: 3 /MM3
SARS-COV-2 ORF1AB RESP QL NAA+PROBE: NOT DETECTED

## 2022-01-11 PROCEDURE — 89051 BODY FLUID CELL COUNT: CPT | Performed by: EMERGENCY MEDICINE

## 2022-01-11 PROCEDURE — 25010000002 HYDROMORPHONE PER 4 MG: Performed by: HOSPITALIST

## 2022-01-11 PROCEDURE — 99024 POSTOP FOLLOW-UP VISIT: CPT | Performed by: SURGERY

## 2022-01-11 PROCEDURE — 3E1M39Z IRRIGATION OF PERITONEAL CAVITY USING DIALYSATE, PERCUTANEOUS APPROACH: ICD-10-PCS | Performed by: INTERNAL MEDICINE

## 2022-01-11 PROCEDURE — 25010000002 CEFTRIAXONE PER 250 MG: Performed by: HOSPITALIST

## 2022-01-11 PROCEDURE — 87070 CULTURE OTHR SPECIMN AEROBIC: CPT | Performed by: EMERGENCY MEDICINE

## 2022-01-11 PROCEDURE — 84157 ASSAY OF PROTEIN OTHER: CPT | Performed by: EMERGENCY MEDICINE

## 2022-01-11 PROCEDURE — 82945 GLUCOSE OTHER FLUID: CPT | Performed by: EMERGENCY MEDICINE

## 2022-01-11 PROCEDURE — 87205 SMEAR GRAM STAIN: CPT | Performed by: EMERGENCY MEDICINE

## 2022-01-11 PROCEDURE — 25010000002 PROCHLORPERAZINE 10 MG/2ML SOLUTION: Performed by: HOSPITALIST

## 2022-01-11 PROCEDURE — 25010000002 ONDANSETRON PER 1 MG: Performed by: HOSPITALIST

## 2022-01-11 RX ORDER — DEXTROSE MONOHYDRATE, SODIUM CHLORIDE, SODIUM LACTATE, CALCIUM CHLORIDE, MAGNESIUM CHLORIDE 2.5; 538; 448; 18.4; 5.08 G/100ML; MG/100ML; MG/100ML; MG/100ML; MG/100ML
2000 SOLUTION INTRAPERITONEAL AS NEEDED
Status: DISCONTINUED | OUTPATIENT
Start: 2022-01-11 | End: 2022-01-15

## 2022-01-11 RX ORDER — HYDROMORPHONE HYDROCHLORIDE 1 MG/ML
0.5 INJECTION, SOLUTION INTRAMUSCULAR; INTRAVENOUS; SUBCUTANEOUS EVERY 4 HOURS PRN
Status: DISCONTINUED | OUTPATIENT
Start: 2022-01-11 | End: 2022-01-12

## 2022-01-11 RX ORDER — ONDANSETRON 2 MG/ML
4 INJECTION INTRAMUSCULAR; INTRAVENOUS ONCE
Status: DISCONTINUED | OUTPATIENT
Start: 2022-01-11 | End: 2022-01-11

## 2022-01-11 RX ORDER — ONDANSETRON 2 MG/ML
4 INJECTION INTRAMUSCULAR; INTRAVENOUS EVERY 4 HOURS PRN
Status: DISCONTINUED | OUTPATIENT
Start: 2022-01-11 | End: 2022-01-16

## 2022-01-11 RX ORDER — PROCHLORPERAZINE EDISYLATE 5 MG/ML
5 INJECTION INTRAMUSCULAR; INTRAVENOUS EVERY 6 HOURS PRN
Status: DISCONTINUED | OUTPATIENT
Start: 2022-01-11 | End: 2022-01-14

## 2022-01-11 RX ORDER — ATORVASTATIN CALCIUM 20 MG/1
20 TABLET, FILM COATED ORAL NIGHTLY
Status: DISCONTINUED | OUTPATIENT
Start: 2022-01-11 | End: 2022-01-11

## 2022-01-11 RX ORDER — DEXTROSE MONOHYDRATE, SODIUM CHLORIDE, SODIUM LACTATE, CALCIUM CHLORIDE, MAGNESIUM CHLORIDE 1.5; 538; 448; 18.4; 5.08 G/100ML; MG/100ML; MG/100ML; MG/100ML; MG/100ML
2000 SOLUTION INTRAPERITONEAL AS NEEDED
Status: DISCONTINUED | OUTPATIENT
Start: 2022-01-11 | End: 2022-01-15

## 2022-01-11 RX ORDER — MELATONIN
1000 DAILY
Status: DISCONTINUED | OUTPATIENT
Start: 2022-01-11 | End: 2022-01-16 | Stop reason: HOSPADM

## 2022-01-11 RX ORDER — MORPHINE SULFATE 2 MG/ML
2 INJECTION, SOLUTION INTRAMUSCULAR; INTRAVENOUS ONCE
Status: DISCONTINUED | OUTPATIENT
Start: 2022-01-11 | End: 2022-01-11

## 2022-01-11 RX ORDER — ATORVASTATIN CALCIUM 20 MG/1
10 TABLET, FILM COATED ORAL NIGHTLY
Status: DISCONTINUED | OUTPATIENT
Start: 2022-01-11 | End: 2022-01-12

## 2022-01-11 RX ORDER — PANTOPRAZOLE SODIUM 40 MG/10ML
40 INJECTION, POWDER, LYOPHILIZED, FOR SOLUTION INTRAVENOUS
Status: DISCONTINUED | OUTPATIENT
Start: 2022-01-11 | End: 2022-01-11

## 2022-01-11 RX ORDER — AMLODIPINE BESYLATE 5 MG/1
5 TABLET ORAL DAILY
Status: DISCONTINUED | OUTPATIENT
Start: 2022-01-11 | End: 2022-01-12

## 2022-01-11 RX ORDER — CALCITRIOL 0.25 UG/1
0.25 CAPSULE, LIQUID FILLED ORAL DAILY
Status: DISCONTINUED | OUTPATIENT
Start: 2022-01-11 | End: 2022-01-16 | Stop reason: HOSPADM

## 2022-01-11 RX ORDER — PANTOPRAZOLE SODIUM 40 MG/10ML
40 INJECTION, POWDER, LYOPHILIZED, FOR SOLUTION INTRAVENOUS EVERY 12 HOURS SCHEDULED
Status: DISCONTINUED | OUTPATIENT
Start: 2022-01-11 | End: 2022-01-15

## 2022-01-11 RX ADMIN — PROCHLORPERAZINE EDISYLATE 5 MG: 5 INJECTION INTRAMUSCULAR; INTRAVENOUS at 11:20

## 2022-01-11 RX ADMIN — HYDROMORPHONE HYDROCHLORIDE 0.5 MG: 1 INJECTION, SOLUTION INTRAMUSCULAR; INTRAVENOUS; SUBCUTANEOUS at 18:23

## 2022-01-11 RX ADMIN — ONDANSETRON 4 MG: 2 INJECTION INTRAMUSCULAR; INTRAVENOUS at 10:10

## 2022-01-11 RX ADMIN — ONDANSETRON 4 MG: 2 INJECTION INTRAMUSCULAR; INTRAVENOUS at 18:23

## 2022-01-11 RX ADMIN — DEXTROSE MONOHYDRATE, SODIUM CHLORIDE, SODIUM LACTATE, CALCIUM CHLORIDE, MAGNESIUM CHLORIDE 2000 ML: 2.5; 538; 448; 18.4; 5.08 SOLUTION INTRAPERITONEAL at 22:03

## 2022-01-11 RX ADMIN — HYDROMORPHONE HYDROCHLORIDE 0.5 MG: 1 INJECTION, SOLUTION INTRAMUSCULAR; INTRAVENOUS; SUBCUTANEOUS at 06:13

## 2022-01-11 RX ADMIN — CEFTRIAXONE 1 G: 1 INJECTION, POWDER, FOR SOLUTION INTRAMUSCULAR; INTRAVENOUS at 21:59

## 2022-01-11 RX ADMIN — SODIUM CHLORIDE, PRESERVATIVE FREE 10 ML: 5 INJECTION INTRAVENOUS at 21:59

## 2022-01-11 RX ADMIN — HYDROMORPHONE HYDROCHLORIDE 0.5 MG: 1 INJECTION, SOLUTION INTRAMUSCULAR; INTRAVENOUS; SUBCUTANEOUS at 10:10

## 2022-01-11 RX ADMIN — PANTOPRAZOLE SODIUM 40 MG: 40 INJECTION, POWDER, FOR SOLUTION INTRAVENOUS at 21:59

## 2022-01-11 RX ADMIN — DEXTROSE MONOHYDRATE, SODIUM CHLORIDE, SODIUM LACTATE, CALCIUM CHLORIDE, MAGNESIUM CHLORIDE 2000 ML: 1.5; 538; 448; 18.4; 5.08 SOLUTION INTRAPERITONEAL at 10:21

## 2022-01-11 RX ADMIN — ONDANSETRON 4 MG: 2 INJECTION INTRAMUSCULAR; INTRAVENOUS at 06:13

## 2022-01-11 RX ADMIN — PANTOPRAZOLE SODIUM 40 MG: 40 INJECTION, POWDER, FOR SOLUTION INTRAVENOUS at 11:20

## 2022-01-11 RX ADMIN — ATORVASTATIN CALCIUM 10 MG: 20 TABLET, FILM COATED ORAL at 21:59

## 2022-01-11 RX ADMIN — DEXTROSE MONOHYDRATE, SODIUM CHLORIDE, SODIUM LACTATE, CALCIUM CHLORIDE, MAGNESIUM CHLORIDE 2000 ML: 1.5; 538; 448; 18.4; 5.08 SOLUTION INTRAPERITONEAL at 14:52

## 2022-01-11 RX ADMIN — DEXTROSE MONOHYDRATE, SODIUM CHLORIDE, SODIUM LACTATE, CALCIUM CHLORIDE, MAGNESIUM CHLORIDE 2000 ML: 1.5; 538; 448; 18.4; 5.08 SOLUTION INTRAPERITONEAL at 18:24

## 2022-01-11 RX ADMIN — AMLODIPINE BESYLATE 5 MG: 5 TABLET ORAL at 11:20

## 2022-01-11 NOTE — CASE MANAGEMENT/SOCIAL WORK
Discharge Planning Assessment  [facility]     Patient Name: Fany Todd  MRN: [id number]  Today's Date: 1/11/2022    Admit Date: 1/10/2022     Discharge Needs Assessment     Row Name 01/11/22 1024       Living Environment    Lives With alone    Current Living Arrangements home/apartment/condo    Primary Care Provided by self    Provides Primary Care For no one, unable/limited ability to care for self    Family Caregiver if Needed sibling(s)    Family Caregiver Names sisters Kim Ortez [phone] and Adriane Todd [phone]    Quality of Family Relationships helpful; involved; supportive    Able to Return to Prior Arrangements yes       Resource/Environmental Concerns    Resource/Environmental Concerns home accessibility    Home Accessibility Concerns stairs to enter home  2-3 steps with 1 handrail to enter her single story home       Transition Planning    Patient/Family Anticipates Transition to home    Patient/Family Anticipated Services at Transition none    Transportation Anticipated family or friend will provide       Discharge Needs Assessment    Current Outpatient/Agency/Support Group other (see comments)  Home peritoneal dialysis    Equipment Currently Used at Home none    Concerns to be Addressed discharge planning    Anticipated Changes Related to Illness none    Equipment Needed After Discharge none    Discharge Facility/Level of Care Needs other (see comments)  Home peritoneal dialysis    Current Discharge Risk physical impairment               Discharge Plan     Row Name 01/11/22 1023       Plan    Plan Plans home; follow to confirm that the patient can continue with home PD upon d/c.    Provided Post Acute Provider List? N/A    N/A Provider List Comment Denies HH/SNF needs.    Provided Post Acute Provider Quality & Resource List? N/A    N/A Quality & Resource List Comment Denies HH/SNF needs.    Patient/Family in Agreement with Plan yes    Plan Comments Met with the patient at bedside;  explained role of CCP, verified facesheet, checked IMM and discussed discharge planning needs. The patient plans to return home upon d/c with assistance from her sisters Kim Ortez [phone] and Adriane Todd [phone] if needed. The patient uses no DME, has 2-3 steps with 1 handrail to enter her single story home.  The patient’s PCP is Lilibeth Cota, pharmacy is [facility] on [address] and [address] and she denies any trouble remembering to take her medication or with affording her medication. The patient denies any HH/SNF history, was encouraged to bring her POA documents, drives herself to appointments and states that her sisters will transport her home upon d/c.  The patient had a new PD catheter placed about 2 weeks ago.  She has been doing home PD for about 10 years.  The patient states that she goes to her home clinic at [facility] [phone]. The patient currently denies any d/c needs.  CCP will follow to confirm that the patient will be able to resume home PD dialysis upon d/c.  FAITH Alfred              Continued Care and Services - Admitted Since 1/10/2022    Coordination has not been started for this encounter.       Expected Discharge Date and Time     Expected Discharge Date Expected Discharge Time    Jan 16, 2022          Demographic Summary     Row Name 01/11/22 1024       General Information    Admission Type inpatient    Arrived From home    Referral Source admission list    Reason for Consult discharge planning    Preferred Language English     Used During This Interaction no               Functional Status     Row Name 01/11/22 1024       Functional Status    Usual Activity Tolerance good    Current Activity Tolerance moderate       Functional Status, IADL    Medications independent    Meal Preparation independent    Housekeeping independent    Laundry independent    Shopping independent       Mental Status     General Appearance WDL WDL       Mental Status Summary    Recent Changes in Mental Status/Cognitive Functioning no changes               Psychosocial    No documentation.                Abuse/Neglect    No documentation.                Legal    No documentation.                Substance Abuse    No documentation.                Patient Forms    No documentation.                   FAITH Olson

## 2022-01-11 NOTE — PROGRESS NOTES
76-year-old lady who underwent removal of malfunctioning peritoneal dialysis catheter with placement of new peritoneal dialysis catheter on 12/27/2021.  She presented to the emergency room with poor oral intake and recurrent nausea.    Her abdomen is soft and nontender at the time of my evaluation.  Her incisions have healed well with no evidence of infection.    She has been afebrile with normal vital signs since admission.  Peritoneal fluid culture is negative thus far.  WBC 13.9 yesterday.    I do not see any findings on exam or further clinical work-up including CT scan to suggest infection of the peritoneal dialysis catheter.  I ordered sutures to be removed.

## 2022-01-11 NOTE — PLAN OF CARE
Goal Outcome Evaluation:  Plan of Care Reviewed With: patient           Outcome Summary: 3 PD exchanges completed this shift, tolerated well.  Pain and nausea treated with PRN medications.  Per order sutures removed from abdomen.  Pt able to tolerate a little CLD.  VSS, all needs met this shift, will continue to monitor

## 2022-01-11 NOTE — ED NOTES
Pt states that she had abd surgery for peritoneal dialysis placed three weeks ago.  Pt states that she continues to have abd pain. Pt has been on peritoneal dialysis x10yrs and had catheter replaced. Pt reports feeling weak and tired after waking up    This RN wore mask and goggles during time of contact       Justina Romero, RN  01/10/22 8281

## 2022-01-11 NOTE — CONSULTS
[facility] Consult Note      Patient Name: Fany Todd  : 1945  MRN: [id number]  Primary Care Physician:  Lilibeth Cota MD  Referring Physician: Matthew Mendoza MD  Date of admission: 1/10/2022    Subjective     Reason for Consult: ESRD on peritoneal dialysis    HPI:   Fany Todd is a 76 y.o. female patient was admitted yesterday with poor oral intake for about 3 days with recurrent nausea she had a new peritoneal dialysis catheter placed about 2 weeks ago after she got her catheter was not repairable.  Since discharge from the hospital she had low volume few exchanges done at home dialysis clinic since the catheter was fresh the patient has been having nausea abdominal discomfort and poor appetite she stated that she has not had any oral intake for 3 days prior to admission hence she was directed to the hospital to be admitted.  She had problem with hyperkalemia over the weekend was treated and improved.  The etiology of her ESRD has been attributed to progressive renal atrophy versus genetic disease the patient described multiple family members for requiring dialysis.  She has been on dialysis for approximately 10 years  At present she had nausea but no vomiting no abdominal pain, no diarrhea she has poor appetite, no lightheadedness, no chest pain or shortness of air.    Review of Systems:   14 point review of systems is otherwise negative except for mentioned above on HPI    Personal History     Past Medical History:   Diagnosis Date   • Anemia    • Breast cancer (HCC)     Left, Stage I   • Chronic renal insufficiency    • End stage renal disease (HCC)     On dialysis since    • Hyperlipidemia    • Hypertension    • Peritoneal dialysis catheter in place (HCC)    • Peritoneal dialysis catheter in place (HCC)        Past Surgical History:   Procedure Laterality Date   • APPENDECTOMY     • BREAST BIOPSY Left    • COLONOSCOPY     • FOOT SURGERY      BROKEN  FOOT    • INSERTION PERITONEAL DIALYSIS CATHETER N/A 12/27/2021    Procedure: REMOVAL AND PLACEMENT OF PERITONEAL DIALYSIS CATHETER LAPAROSCOPIC;  Surgeon: Pablo Severino MD;  Location: [facility];  Service: General;  Laterality: N/A;   • MASTECTOMY Left 2015   • TUBAL ABDOMINAL LIGATION         Family History: family history includes Breast cancer in her sister; Cancer in her cousin, maternal aunt, and maternal uncle; Cervical cancer in her mother; Heart disease in her maternal aunt and maternal grandmother; Hypertension in her father and sister; Kidney cancer in her father; Kidney disease in her father.    Social History:  reports that she has never smoked. She has never used smokeless tobacco. She reports that she does not drink alcohol and does not use drugs.    Home Medications:  Prior to Admission medications    Medication Sig Start Date End Date Taking? Authorizing Provider   amLODIPine (NORVASC) 5 MG tablet Take 5 mg by mouth Daily. 12/17/18  Yes Swati Wilcox MD   atorvastatin (LIPITOR) 20 MG tablet Take 20 mg by mouth Every Night.   Yes Swati Wilcox MD   calcitriol (ROCALTROL) 0.25 MCG capsule Take 0.25 mcg by mouth Daily. 7/19/21  Yes Swati Wilcox MD   Cholecalciferol (VITAMIN D) 2000 units tablet Take 2,000 Units by mouth 3 (Three) Times a Day.   Yes Swati Wilcox MD   famotidine (PEPCID) 20 MG tablet Take 1 tablet by mouth 2 (Two) Times a Day for 30 days. 12/28/21 1/27/22 Yes Matthew Mendoza MD   gentamicin (GARAMYCIN) 0.1 % cream Apply 1 application topically to the appropriate area as directed Daily. Apply around dialysis access 8/15/19  Yes Swati Wilcox MD   Multiple Vitamins-Iron (MULTIVITAMIN/IRON PO) Take 1 tablet by mouth Daily.   Yes Swati Wilcox MD       Allergies:  No Known Allergies    Objective     Vitals:   Temp:  [97.5 °F (36.4 °C)-97.9 °F (36.6 °C)] 97.6 °F (36.4 °C)  Heart Rate:  [] 84  Resp:  [18] 18  BP: (109-141)/(44-80)  140/80    Intake/Output Summary (Last 24 hours) at 1/11/2022 0843  Last data filed at 1/11/2022 0724  Gross per 24 hour   Intake 100 ml   Output 0 ml   Net 100 ml       Physical Exam:   Constitutional: Awake, alert, no acute distress.  Appears to be chronically ill and frail  HEENT: Sclera anicteric, no conjunctival injection, oral mucosa slightly dry  Neck: Supple, no thyromegaly, no lymphadenopathy, trachea at midline, no JVD  Respiratory: Clear to auscultation bilaterally, nonlabored respiration  Cardiovascular: RRR, no murmurs, no rubs or gallops, no carotid bruit  Gastrointestinal: Positive bowel sounds, abdomen is soft, nontender and nondistended, PD catheter in place exit site is clean there is no tunnel tenderness  : No palpable bladder  Musculoskeletal: No edema, no clubbing or cyanosis  Psychiatric: Appropriate affect, cooperative  Neurologic: Oriented x3, moving all extremities, normal speech and mental status  Skin: Warm and dry       Scheduled Meds:     cefTRIAXone, 1 g, Intravenous, Q24H      IV Meds:        Results Reviewed:   I have personally reviewed the results from the time of this admission to 1/11/2022 08:43 EST     Lab Results   Component Value Date    GLUCOSE 132 (H) 01/10/2022    CALCIUM 8.7 01/10/2022     01/10/2022    K 5.1 01/10/2022    CO2 24.7 01/10/2022    CL 91 (L) 01/10/2022    BUN 78 (H) 01/10/2022    CREATININE 12.19 (H) 01/10/2022    EGFRIFAFRI  01/10/2022      Comment:      <15 Indicative of kidney failure.    EGFRIFNONA 3 (L) 01/10/2022    BCR 6.4 (L) 01/10/2022    ANIONGAP 21.3 (H) 01/10/2022      Lab Results   Component Value Date    MG 1.8 01/10/2022    PHOS 9.7 (H) 12/28/2021    ALBUMIN 3.40 (L) 01/10/2022           Assessment / Plan     ASSESSMENT:  1. ESRD, patient has poor appetite with what appears to be uremic symptoms with inadequate peritoneal dialysis because we are using low volume because of the new catheter placement less than 2 weeks ago.  No evidence of  peritonitis  2.  Hypertension controlled  3.  Anemia of chronic kidney disease, hemoglobin 8.8 patient on long-acting PAOLO  4. history of breast cancer of the left breast with mastectomy treated surgically with no evidence of recurrent      PLAN:  1. Will use full volume peritoneal dialysis 2 L exchanges 5 times today by using CAPD  2.  Place the patient on renal diet with 2 g sodium and 2 g potassium restriction for today, after being on full dose peritoneal dialysis she will require relaxation of the potassium intake  3.  Surveillance labs    Thank you for involving us in the care of Fany Todd.  Please feel free to call with any questions.    Carlos Schwab MD  01/11/22  08:43 EST    [facility]  [phone]      Much of this encounter note is an electronic transcription/translation of spoken language to printed text. The electronic translation of spoken language may permit erroneous, or at times, nonsensical words or phrases to be inadvertently transcribed; Although I have reviewed the note for such errors, some may still exist.

## 2022-01-11 NOTE — DISCHARGE PLACEMENT REQUEST
"Fany Todd (76 y.o. Female)             Date of Birth Social Security Number Address Home Phone MRN    1945  [id number]  [facility] [address] [phone] [id number]    Sh[place] Marital Status             Unknown Single       Admission Date Admission Type Admitting Provider Attending Provider Department, Room/Bed    1/10/22 Emergency Matthew Mendoza MD Ahmed, Aftab, MD [address], [address]    Discharge Date Discharge Disposition Discharge Destination                         Attending Provider: Matthew Mendoza MD    Allergies: No Known Allergies    Isolation: None   Infection: None   Code Status: Prior   Advance Care Planning Activity    Ht: 160 cm (63\")   Wt: 52.3 kg (115 lb 4.8 oz)    Admission Cmt: None   Principal Problem: None                Active Insurance as of 1/10/2022     Primary Coverage     Payor Plan Insurance Group Employer/Plan Group    MEDICARE MEDICARE A & B      Payor Plan Address Payor Plan Phone Number Payor Plan Fax Number Effective Dates    [address] [phone]  2/1/2011 - None Entered    [facility] [id number]       Subscriber Name Subscriber Birth Date Member ID       FANY TODD 1945 [id number]           Secondary Coverage     Payor Plan Insurance Group Employer/Plan Group    AARP MC SUP AARP HEALTH CARE OPTIONS      Payor Plan Address Payor Plan Phone Number Payor Plan Fax Number Effective Dates    [facility] [phone]  1/1/2016 - None Entered    [address]       [facility] [address]       Subscriber Name Subscriber Birth Date Member ID       FANY TODD 1945 [id number]                 Emergency Contacts      (Rel.) Home Phone Work Phone Mobile Phone    Kim Ortez (Sister) [phone] -- --    Adriane Todd (Sister) [phone] -- --            {Outbreak/Travel/Exposure Documentation......;  Question Available Choices Patient Response   COVID-19 Outbreak Screen:  Do you currently have a new onset " of the following symptoms?        Fever/Chills, Cough, Shortness of air, Loss of taste or smell, No, Unknown  No (01/11/22 0536)   COVID-19 Outbreak Screen: In the last 14 days, have you had contact with anyone who is ill, has show any of the symptoms listed above and/or has been diagnosis with the 2019 Novel Coronavirus? This includes any immediate household members but excludes any patients with whom you have been in contact within your normal work duties wearing proper PPE, if you are a healthcare worker.  Yes, No, Unknown              No (01/11/22 0536)   COVID-19 Outbreak Screen: Who was notified? Free text (not recorded)   Ebola Screening Outbreak Screen: Have you traveled to the [place] or [place] within the past 21 days?  Yes, No, Unknown (not recorded)   Ebola Screening Outbreak Screen: Do you have ANY of the following symptoms: Fever/Chills, Vomiting, Diarrhea, Fatigue, Headache, Muscle pain, Unexplained bleeding, Abdominal (stomach) pain, No, Unknown (not recorded)   Ebola Screening Outbreak Screen: Name of Person notified Free text (not recorded)   Travel Screen: Have you traveled in the last month? If so, to what country have you traveled? If [place] what state? Yes, No, Unknown  List of all countries  List of all States No (01/11/22 0537)  (not recorded)  (not recorded)   Infection Risk: Do you currently have the following symptoms?  (If cough is selected, the Tuberculosis Screen is performed.) Cough, Fever, Rash, No No (01/11/22 0537)   Tuberculosis Screen: Do you have any of the following Tuberculosis Risks?  · Have you lived or spent time with anyone who had or may have TB?  · Have you lived in or visited any of the following areas for more than one month: [place], [place], [place], [place] or [place], the [place] or [place]?  · Do you have HIV/AIDS?  · Have you lived in or worked in a nursing home, homeless shelter, correctional facility, or substance abuse treatment  facility?   · No    If Yes do you have any of the following symptoms? Yes responses display to the right    If Yes, symptoms listed are:  Cough greater than or equal to 3 weeks, Loss of appetite, Unexplained weight loss, Night sweats, Bloody sputum or hemoptysis, Hoarseness, Fever, Fatigue, Chest pain, No (not recorded)  (not recorded)   Exposure Screen: Have you been exposed to any of these contagious diseases in the last month? Measles, Chickenpox, Meningitis, Pertussis, Whooping Cough, No No (01/11/22 0537)

## 2022-01-11 NOTE — ED PROVIDER NOTES
EMERGENCY DEPARTMENT ENCOUNTER    Room Number:  23/23  Date of encounter:  1/11/2022  PCP: Lilibeth Cota MD  Historian: Patient      HPI:  Chief Complaint: Abdominal pain  A complete HPI/ROS/PMH/PSH/SH/FH are unobtainable due to: N/A    Context: Fany Todd is a 76 y.o. female who presents to the ED c/o abdominal pain since her peritoneal dialysis catheter was replaced 3 weeks ago.  She states it is constant.  She has not had much of an appetite.  She has had neither nausea nor vomiting but she has had a few small bouts of diarrhea.  She has had neither fever nor chills.  She does not make much urine and reports no dysuria.    She states she has not had any problems with her dialysate-she states that her effluent is clear and nonbloody.    When the patient was being brought back from triage, she had a syncopal episode.  She had been sitting in her wheelchair in the waiting room for quite some time and when staff went to stand her up and placed on another wheelchair to bring her back to the emergency department, she had a syncopal episode.  I evaluated her immediately at bedside-she never lost a pulse, she was unresponsive for a few minutes and slowly returned to baseline.  She was able to give me the above history.  No ectopy was noted on the monitor, Accu-Chek was acceptable, no focal seizure activity was noted.      The patient was placed in a mask in triage, hand hygiene was performed before and after my interaction with the patient.  I wore a mask, safety glasses and gloves during my entire interaction with the patient.    PAST MEDICAL HISTORY  Active Ambulatory Problems     Diagnosis Date Noted   • Anemia 11/04/2016   • History of breast cancer 11/04/2016   • ESRD (end stage renal disease) (HCC) 12/26/2021     Resolved Ambulatory Problems     Diagnosis Date Noted   • No Resolved Ambulatory Problems     Past Medical History:   Diagnosis Date   • Breast cancer (HCC) 2015   • Chronic renal insufficiency     • End stage renal disease (HCC)    • Hyperlipidemia    • Hypertension    • Peritoneal dialysis catheter in place (HCC)    • Peritoneal dialysis catheter in place (HCC)          PAST SURGICAL HISTORY  Past Surgical History:   Procedure Laterality Date   • APPENDECTOMY     • BREAST BIOPSY Left 2015   • COLONOSCOPY     • FOOT SURGERY      BROKEN FOOT    • INSERTION PERITONEAL DIALYSIS CATHETER N/A 12/27/2021    Procedure: REMOVAL AND PLACEMENT OF PERITONEAL DIALYSIS CATHETER LAPAROSCOPIC;  Surgeon: Pablo Severino MD;  Location: [facility] OR;  Service: General;  Laterality: N/A;   • MASTECTOMY Left 2015   • TUBAL ABDOMINAL LIGATION           FAMILY HISTORY  Family History   Problem Relation Age of Onset   • Cervical cancer Mother    • Hypertension Father    • Kidney cancer Father    • Kidney disease Father    • Breast cancer Sister    • Hypertension Sister    • Heart disease Maternal Aunt    • Cancer Maternal Aunt    • Cancer Maternal Uncle    • Heart disease Maternal Grandmother    • Cancer Cousin          SOCIAL HISTORY  Social History     Socioeconomic History   • Marital status: Single   • Years of education: High school   Tobacco Use   • Smoking status: Never Smoker   • Smokeless tobacco: Never Used   Vaping Use   • Vaping Use: Never used   Substance and Sexual Activity   • Alcohol use: No   • Drug use: No   • Sexual activity: Defer         ALLERGIES  Patient has no known allergies.        REVIEW OF SYSTEMS  Review of Systems   Constitutional: Positive for appetite change. Negative for chills and fever.   Respiratory: Negative for cough and shortness of breath.    Cardiovascular: Negative for chest pain and palpitations.   Gastrointestinal: Positive for abdominal pain. Negative for blood in stool, nausea and vomiting.   Neurological: Negative for headaches.        All systems reviewed and negative except for those discussed in HPI.       PHYSICAL EXAM    I have reviewed the triage vital signs and nursing  notes.    ED Triage Vitals   Temp Heart Rate Resp BP SpO2   -- 01/10/22 1709 01/10/22 1709 01/10/22 1812 01/10/22 1709    75 18 120/67 95 %      Temp src Heart Rate Source Patient Position BP Location FiO2 (%)   -- -- 01/10/22 1812 -- --     Sitting         Physical Exam   Constitutional: Pt. was initially lethargic and unresponsive after her syncopal episode-this cleared relatively quickly.  Afterward, she was  oriented to person, place, and time.  HENT: Normocephalic and atraumatic. Oropharynx moist/nonerythematous.  Neck: Normal range of motion. Neck supple. No JVD present.   Cardiovascular: Normal rate, regular rhythm and normal heart sounds. Exam reveals no gallop and no friction rub.   No murmur heard.  Pulmonary/Chest: Effort normal and breath sounds normal. No stridor. No respiratory distress. No wheezes, no rales.   Abdominal: The abdomen is soft and slightly distended.  The surgical incisions are clean, dry and intact.  There is no erythema over the abdomen.  She has mild generalized tenderness without guarding or rebound..   Musculoskeletal: Normal range of motion. No edema, tenderness or deformity.   Neurological: Pt. is alert and oriented to person, place, and time.  She has no focal neurologic deficits  Skin: Skin is warm and dry. No rash noted. Pt. is not diaphoretic. No erythema.   Psychiatric: Mood, affect and judgment normal.  She is pleasant and cooperative.  Nursing note and vitals reviewed.        LAB RESULTS  Recent Results (from the past 24 hour(s))   Comprehensive Metabolic Panel    Collection Time: 01/10/22  6:12 PM    Specimen: Blood   Result Value Ref Range    Glucose 132 (H) 65 - 99 mg/dL    BUN 78 (H) 8 - 23 mg/dL    Creatinine 12.19 (H) 0.57 - 1.00 mg/dL    Sodium 137 136 - 145 mmol/L    Potassium 5.1 3.5 - 5.2 mmol/L    Chloride 91 (L) 98 - 107 mmol/L    CO2 24.7 22.0 - 29.0 mmol/L    Calcium 8.7 8.6 - 10.5 mg/dL    Total Protein 5.9 (L) 6.0 - 8.5 g/dL    Albumin 3.40 (L) 3.50 - 5.20  g/dL    ALT (SGPT) 7 1 - 33 U/L    AST (SGOT) 13 1 - 32 U/L    Alkaline Phosphatase 107 39 - 117 U/L    Total Bilirubin 0.2 0.0 - 1.2 mg/dL    eGFR Non African Amer 3 (L) >60 mL/min/1.73    eGFR  African Amer      Globulin 2.5 gm/dL    A/G Ratio 1.4 g/dL    BUN/Creatinine Ratio 6.4 (L) 7.0 - 25.0    Anion Gap 21.3 (H) 5.0 - 15.0 mmol/L   Troponin    Collection Time: 01/10/22  6:12 PM    Specimen: Blood   Result Value Ref Range    Troponin T 0.059 (C) 0.000 - 0.030 ng/mL   Magnesium    Collection Time: 01/10/22  6:12 PM    Specimen: Blood   Result Value Ref Range    Magnesium 1.8 1.6 - 2.4 mg/dL   Green Top (Gel)    Collection Time: 01/10/22  6:12 PM   Result Value Ref Range    Extra Tube Hold for add-ons.    Lavender Top    Collection Time: 01/10/22  6:12 PM   Result Value Ref Range    Extra Tube hold for add-on    Gold Top - SST    Collection Time: 01/10/22  6:12 PM   Result Value Ref Range    Extra Tube Hold for add-ons.    Light Blue Top    Collection Time: 01/10/22  6:12 PM   Result Value Ref Range    Extra Tube hold for add-on    CBC Auto Differential    Collection Time: 01/10/22  6:12 PM    Specimen: Blood   Result Value Ref Range    WBC 13.89 (H) 3.40 - 10.80 10*3/mm3    RBC 2.69 (L) 3.77 - 5.28 10*6/mm3    Hemoglobin 8.8 (L) 12.0 - 15.9 g/dL    Hematocrit 25.7 (L) 34.0 - 46.6 %    MCV 95.5 79.0 - 97.0 fL    MCH 32.7 26.6 - 33.0 pg    MCHC 34.2 31.5 - 35.7 g/dL    RDW 14.4 12.3 - 15.4 %    RDW-SD 49.6 37.0 - 54.0 fl    MPV 10.4 6.0 - 12.0 fL    Platelets 444 140 - 450 10*3/mm3    Neutrophil % 89.8 (H) 42.7 - 76.0 %    Lymphocyte % 5.4 (L) 19.6 - 45.3 %    Monocyte % 3.2 (L) 5.0 - 12.0 %    Eosinophil % 0.2 (L) 0.3 - 6.2 %    Basophil % 0.2 0.0 - 1.5 %    Immature Grans % 1.2 (H) 0.0 - 0.5 %    Neutrophils, Absolute 12.47 (H) 1.70 - 7.00 10*3/mm3    Lymphocytes, Absolute 0.75 0.70 - 3.10 10*3/mm3    Monocytes, Absolute 0.44 0.10 - 0.90 10*3/mm3    Eosinophils, Absolute 0.03 0.00 - 0.40 10*3/mm3    Basophils,  Absolute 0.03 0.00 - 0.20 10*3/mm3    Immature Grans, Absolute 0.17 (H) 0.00 - 0.05 10*3/mm3    nRBC 0.0 0.0 - 0.2 /100 WBC   POC Glucose Once    Collection Time: 01/10/22  9:31 PM    Specimen: Blood   Result Value Ref Range    Glucose 155 (H) 70 - 130 mg/dL   ECG 12 Lead    Collection Time: 01/10/22 11:40 PM   Result Value Ref Range    QT Interval 339 ms       Ordered the above labs and independently reviewed the results.        RADIOLOGY  CT Abdomen Pelvis Without Contrast    Result Date: 1/10/2022  CT OF THE ABDOMEN AND PELVIS WITHOUT CONTRAST  HISTORY: Weakness. Diarrhea.  COMPARISON: None available.  TECHNIQUE: Axial CT imaging was obtained through the abdomen and pelvis. No IV contrast was administered.  FINDINGS: Images through the lung bases demonstrate some dependent atelectasis. There are old bilateral rib fractures. There is an old right inferior pubic ramus fracture. No acute osseous abnormalities are seen. No suspicious hepatic lesions are seen. Adrenal glands, spleen, stomach, and duodenum are unremarkable. Adrenal glands are within normal limits, as are the pancreas and gallbladder. Innumerable cysts are noted on the kidneys. Kidneys are atrophic. There is no hydronephrosis. The patient is noted to have free fluid within the abdomen and pelvis, keeping with history of peritoneal dialysis. There is some calcification of the aorta. There is a 4 mm nonobstructing stone within the left kidney. Urinary bladder is decompressed, and may contain some stones within it, versus some bladder wall calcification. This was also present on the prior study. There is colonic diverticulosis. There is no diverticulitis. There is no evidence of bowel obstruction. Peritoneal dialysis catheter is coiled within the pelvis. There is some mild stranding along its course, particularly immediately official to its entry site. This may represent some localized inflammation, although no associated fluid collection is seen.       Patient does have some mild stranding adjacent to the entry site of the patient's peritoneal dialysis catheter into the peritoneum. This may be related to recent exchange, although some localized inflammation is not excluded. Correlation with any evidence of infection is recommended.  Radiation dose reduction techniques were utilized, including automated exposure control and exposure modulation based on body size.  This report was finalized on 1/10/2022 10:17 PM by Dr. Dian Condon M.D.      CT Head Without Contrast    Result Date: 1/10/2022  CT HEAD WITHOUT CONTRAST  HISTORY: Syncope  COMPARISON: None available.  TECHNIQUE: Axial CT imaging was obtained through the brain. No IV contrast was administered.  FINDINGS: No acute intracranial hemorrhage is seen. There is diffuse atrophy. There is periventricular and deep white matter microangiopathic change. There is no midline shift or mass effect. No calvarial fracture is seen. There is some minimal partial opacification of the right mastoid air cells, with extensive opacification of the left mastoid air cells. Correlation with any evidence of mastoiditis is suggested. Patient is also noted to have some fluid or debris within the left middle ear.       1. No acute intracranial findings. 2. Extensive opacification of the left mastoid air cells. Correlation with any evidence of mastoiditis is suggested.  Radiation dose reduction techniques were utilized, including automated exposure control and exposure modulation based on body size.  This report was finalized on 1/10/2022 10:02 PM by Dr. Dian Condon M.D.      XR Chest 1 View    Result Date: 1/10/2022  XR CHEST 1 VW-  Clinical: Acute mental status change, lethargy, vertigo  FINDINGS: Heart size within normal limits. Ectasia of the thoracic aorta, mediastinum and glen otherwise satisfactory in appearance.  Fracture of the left seventh rib with associated pleural thickening or small amount of adjacent  loculated pleural fluid. No pleural effusion seen at the left costophrenic sulcus. Linear area scarring versus atelectasis at the left base. Subtle deformities of the right ribs suggested, these appear likely old.  No edema or consolidation is demonstrated. The remainder is unremarkable.  This report was finalized on 1/10/2022 7:36 PM by Dr. August Skinner M.D.        I ordered the above noted radiological studies. Reviewed by me and discussed with radiologist.  See dictation for official radiology interpretation.      PROCEDURES    Procedures      MEDICATIONS GIVEN IN ER    Medications   sodium chloride 0.9 % flush 10 mL (has no administration in time range)   morphine injection 2 mg (2 mg Intravenous Given 1/10/22 2337)   ondansetron (ZOFRAN) injection 4 mg (4 mg Intravenous Given 1/10/22 2336)   cefTRIAXone (ROCEPHIN) 2 g in sodium chloride 0.9 % 100 mL IVPB-VTB (2 g Intravenous New Bag 1/10/22 2337)         PROGRESS, DATA ANALYSIS, CONSULTS, AND MEDICAL DECISION MAKING    Any/all labs have been independently reviewed by me.  Any/all radiology studies have been reviewed by me and discussed with radiologist dictating the report.   EKG's independently viewed and interpreted by me.  Discussion below represents my analysis of pertinent findings related to patient's condition, differential diagnosis, treatment plan and final disposition.    Number of Diagnoses or Management Options     Amount and/or Complexity of Data Reviewed  Clinical lab tests:  Yes  Tests in the radiology section of CPT®:  Yes  Tests in the medicine section of CPT®:  Yes  Review and summarize past medical records: yes (patient admitted a few weeks ago for replacement of a malfunctioning peritoneal dialysis catheter)  Independent visualization of images, tracings, or specimens: yes (see below)      ED Course as of 01/11/22 0128   Mon [place] 10, 2022   2140 Abd Pain MDM includes but is not limited to: Cholecystitis, choledocholithiasis, cholangitis,  peritonitis, pancreatitis/pseudocyst, peptic ulcer, bowel obstruction/ileus, constipation, diverticulitis/perforation/abscess, inflammatory bowel disease, renal colic/stone, urinary tract infection/pyelonephritis, prostatitis, mesenteric ischemia, expanding/leaking AAA, testicular/ovarian torsion. [WC]   2141 CMP reviewed-BUN and creatinine are at her baseline. [WC]   2141 CBC reviewed-there is mild leukocytosis and a chronic stable anemia. [WC]   2142 Magnesium: 1.8  Normal [WC]   2142 Troponin T(!!): 0.059 [WC]   2222 CT of the brain was independently viewed by me and discussed with Dr. Condon.  (radiology)-there are no acute processes identified.  There is opacification of the left mastoid air cells.  For official interpretation, see dictated report. [WC]   2223 Noncontrasted CT of the abdomen pelvis was independently visualized by me and discussed with patient interpreted by Dr. Condon (radiology)-there is mild stranding adjacent to the entry site of the peritoneal dialysis catheter which could be related to recent exchange and/or localized inflammation.  See dictated report for official interpretation. [WC]   2255 Case discussed with Dr. Mendoza ([place] [facility])-he accepts the patient for admission to telemetry bed.    We discussed underlying concerns for smoldering peritonitis-dialysate will need to be studied.  Dialysis unit CN aware and will come draw fluid for culture, etc... IV Rocephin [WC]   2345 EKG performed at 2340 and interpreted by me shows normal sinus rhythm with a heart of 98 bpm.  IA intervals, QRS complexes and ST-T segments are unremarkable.  There is no significant change compared to 12/20/2021. [WC]      ED Course User Index  [WC] Alexandro Cannon MD       AS OF 01:28 EST VITALS:    BP - 115/78  HR - 102  TEMP - 97.9 °F (36.6 °C) (Oral)  02 SATS - 91%        DIAGNOSIS  Final diagnoses:   Generalized abdominal pain   ESRD on peritoneal dialysis (HCC)    Leukocytosis, unspecified type   Syncope and collapse         DISPOSITION  Admitted-telemetry           Alexandro Cannon MD  01/11/22 0690

## 2022-01-11 NOTE — H&P
History and physical    Primary care physician  Dr. LARSEN    Complaint complaint  Abdominal pain  Nausea vomiting    History of present illness  76 white female with history of end-stage renal disease on peritoneal dialysis hypertension hyperlipidemia and chronic anemia presented to [facility] emergency room with abdominal pain with dry heaving nausea and vomited couple of times.  Patient denies any diarrhea but had 1 episode.  Patient denies any fever or chills.  Patient denies any black stools or blood in the stools.  Patient work-up in ER revealed persistent nausea vomiting secondary to uremia and possible infection secondary to peritonitis catheter admitted for management    PAST MEDICAL HISTORY  • Breast cancer (HCC) 2015   • Chronic renal insufficiency     • End stage renal disease      • Hyperlipidemia     • Hypertension     • Peritoneal dialysis catheter in place      •         PAST SURGICAL HISTORY              Procedure Laterality Date   • APPENDECTOMY       • BREAST BIOPSY Left 2015   • COLONOSCOPY       • FOOT SURGERY         BROKEN FOOT    • INSERTION PERITONEAL DIALYSIS CATHETER N/A 12/27/2021     Procedure: REMOVAL AND PLACEMENT OF PERITONEAL DIALYSIS CATHETER LAPAROSCOPIC;  Surgeon: Pablo Severino MD;  Location: [facility];  Service: General;  Laterality: N/A;   • MASTECTOMY Left 2015   • TUBAL ABDOMINAL LIGATION             FAMILY HISTORY           Problem Relation Age of Onset   • Cervical cancer Mother     • Hypertension Father     • Kidney cancer Father     • Kidney disease Father     • Breast cancer Sister     • Hypertension Sister     • Heart disease Maternal Aunt     • Cancer Maternal Aunt     • Cancer Maternal Uncle     • Heart disease Maternal Grandmother     • Cancer Cousin        SOCIAL HISTORY                Socioeconomic History   • Marital status: Single   • Years of education: High school   Tobacco Use   • Smoking status: Never Smoker   • Smokeless tobacco: Never  "Used   Vaping Use   • Vaping Use: Never used   Substance and Sexual Activity   • Alcohol use: No   • Drug use: No   • Sexual activity: Defer         ALLERGIES  Patient has no known allergies.  Home medications reviewed     REVIEW OF SYSTEMS  Constitutional: Positive for appetite change. Negative for chills and fever.   Respiratory: Negative for cough and shortness of breath.    Cardiovascular: Negative for chest pain and palpitations.   Gastrointestinal: Positive for abdominal pain nausea and vomiting Negative for blood in stool,   Neurological: Negative for headaches.      PHYSICAL EXAM   Blood pressure 122/46, pulse 79, temperature 97.6 °F (36.4 °C), temperature source Oral, resp. rate 18, height 160 cm (63\"), weight 52.3 kg (115 lb 4.8 oz), SpO2 92 %.    Constitutional:  Awake and alert in mild distress secondary to nausea and pain  HENT: Normocephalic and atraumatic. Oropharynx moist/nonerythematous.  Neck: Normal range of motion. Neck supple. No JVD present.   Cardiovascular: Normal rate, regular rhythm and normal heart sounds.  Pulmonary/Chest: Effort normal and breath sounds normal. No stridor. No wheezes, no rales.   Abdominal:  Soft mild diffuse tenderness bowel sounds positive  Musculoskeletal: Normal range of motion. No edema, tenderness or deformity.   Neurological: Pt. is alert and oriented to person, place, and time. No focal neurologic deficits  Skin: Skin is warm and dry. No rash noted. Pt. is not diaphoretic. No erythema.   Psychiatric: Mood, affect and judgment normal.  She is pleasant and cooperative.    LAB RESULTS  Lab Results (last 24 hours)     Procedure Component Value Units Date/Time    Body Fluid Culture - Body Fluid, Peritoneum [[id number]] Collected: 01/11/22 0009    Specimen: Body Fluid from Peritoneum Updated: 01/11/22 0509     Gram Stain No WBCs or organisms seen    COVID PRE-OP / PRE-PROCEDURE SCREENING ORDER (NO ISOLATION) - Swab, Nasopharynx [[id number]]  (Normal) Collected: 01/10/22 " 2343    Specimen: Swab from Nasopharynx Updated: 01/11/22 0506    Narrative:      The following orders were created for panel order COVID PRE-OP / PRE-PROCEDURE SCREENING ORDER (NO ISOLATION) - Swab, Nasopharynx.  Procedure                               Abnormality         Status                     ---------                               -----------         ------                     COVID-19,APTIMA PANTHER(...[[id number]]  Normal              Final result                 Please view results for these tests on the individual orders.    COVID-19,APTIMA PANTHER(DELMAR),[facility]/ CRISTINA, NP/OP SWAB IN UTM/VTM/SALINE TRANSPORT MEDIA,24 HR TAT - Swab, Nasopharynx [[id number]]  (Normal) Collected: 01/10/22 2343    Specimen: Swab from Nasopharynx Updated: 01/11/22 0506     COVID19 Not Detected    Narrative:      Fact sheet for providers: https://www.fda.gov/media/309979/download     Fact sheet for patients: https://www.fda.gov/media/037206/download    Test performed by RT PCR.    Body Fluid Cell Count With Differential - Peritoneal Fluid, Peritoneum [[id number]] Collected: 01/11/22 0009    Specimen: Peritoneal Fluid from Peritoneum Updated: 01/11/22 0235    Narrative:      The following orders were created for panel order Body Fluid Cell Count With Differential - Peritoneal Fluid, Peritoneum.  Procedure                               Abnormality         Status                     ---------                               -----------         ------                     Body fluid cell count - ...[[id number]]                      Final result               Body fluid differential ...[[id number]]                      Final result                 Please view results for these tests on the individual orders.    Body fluid differential - Peritoneal Fluid, Peritoneum [[id number]] Collected: 01/11/22 0009    Specimen: Peritoneal Fluid from Peritoneum Updated: 01/11/22 0235     Neutrophils, Fluid 44 %      Lymphocytes, Fluid 26 %      Monocytes,  Fluid 6 %      Mononuclear, Fluid 24 %     Narrative:      Slide reviewed by technologist    50 cells counted due to low count     Body fluid cell count - Peritoneal Fluid, Peritoneum [[id number]] Collected: 01/11/22 0009    Specimen: Peritoneal Fluid from Peritoneum Updated: 01/11/22 0153     Color, Fluid Colorless     Appearance, Fluid Clear     RBC, Fluid 3 /mm3      Nucleated Cells, Fluid 9 /mm3      Method: Hemacytometer Method    Narrative:      No reference range established. Physician to interpret results with clinical findings.    Protein, Body Fluid - Peritoneal Fluid, Peritoneum [[id number]] Collected: 01/11/22 0008    Specimen: Peritoneal Fluid from Peritoneum Updated: 01/11/22 0138     Protein, Total, Fluid <1.0 g/dL     Narrative:      No Reference Ranges Established.    A serous fluid total fluid (TP) greater than 50 percent of the serum TP suggests the fluid is an exudate.      1. Pleural TP/Serum TP >0.5  2. Pleural LD/Serum LD >0.6  3. Pleural LD >2/3 of the upper limit of normal for serum LDH    This test was developed, it performance characteristics determined and judged suitable for clinical purposes by [facility].  It has not been cleared or approved by the FDA.  The laboratory is regulated under CLIA as qualified to perform high-complexity testing.     Glucose, Body Fluid - Peritoneal Fluid, Peritoneum [[id number]] Collected: 01/11/22 0008    Specimen: Peritoneal Fluid from Peritoneum Updated: 01/11/22 0138     Glucose, Fluid 394 mg/dL     Narrative:      No Reference Ranges Established.    Serous fluid glucose less than 60 mg/dL or less than 30 mg/dL below serum glucose suggests an infectious or malignant exudate.     This test was developed, it performance characteristics determined and judged suitable for clinical purposes by [facility].  It has not been cleared or approved by the FDA.  The laboratory is regulated under CLIA as qualified to  perform high-complexity testing.     POC Glucose Once [[id number]]  (Abnormal) Collected: 01/10/22 2131    Specimen: Blood Updated: 01/10/22 2133     Glucose 155 mg/dL      Comment: Meter: [id number] : [id number] Antione Palacios       Comprehensive Metabolic Panel [[id number]]  (Abnormal) Collected: 01/10/22 1812    Specimen: Blood Updated: 01/10/22 1936     Glucose 132 mg/dL      BUN 78 mg/dL      Creatinine 12.19 mg/dL      Sodium 137 mmol/L      Potassium 5.1 mmol/L      Chloride 91 mmol/L      CO2 24.7 mmol/L      Calcium 8.7 mg/dL      Total Protein 5.9 g/dL      Albumin 3.40 g/dL      ALT (SGPT) 7 U/L      AST (SGOT) 13 U/L      Alkaline Phosphatase 107 U/L      Total Bilirubin 0.2 mg/dL      eGFR Non African Amer 3 mL/min/1.73      Comment: <15 Indicative of kidney failure.        eGFR   Amer --     Comment: <15 Indicative of kidney failure.        Globulin 2.5 gm/dL      A/G Ratio 1.4 g/dL      BUN/Creatinine Ratio 6.4     Anion Gap 21.3 mmol/L     Narrative:      GFR Normal >60  Chronic Kidney Disease <60  Kidney Failure <15      Magnesium [[id number]]  (Normal) Collected: 01/10/22 1812    Specimen: Blood Updated: 01/10/22 1936     Magnesium 1.8 mg/dL     Troponin [[id number]]  (Abnormal) Collected: 01/10/22 1812    Specimen: Blood Updated: 01/10/22 1928     Troponin T 0.059 ng/mL     Narrative:      Troponin T Reference Range:  <= 0.03 ng/mL-   Negative for AMI  >0.03 ng/mL-     Abnormal for myocardial necrosis.  Clinicians would have to utilize clinical acumen, EKG, Troponin and serial changes to determine if it is an Acute Myocardial Infarction or myocardial injury due to an underlying chronic condition.       Results may be falsely decreased if patient taking Biotin.      [place] Draw [[id number]] Collected: 01/10/22 1812    Specimen: Blood Updated: 01/10/22 1915    Narrative:      The following orders were created for panel order [place] Draw.  Procedure                                Abnormality         Status                     ---------                               -----------         ------                     Green Top (Gel)[[id number]]                                  Final result               Lavender Top[[id number]]                                     Final result               Gold Top - SST[[id number]]                                   Final result               Light Blue Top[[id number]]                                   Final result                 Please view results for these tests on the individual orders.    Green Top (Gel) [[id number]] Collected: 01/10/22 1812    Specimen: Blood Updated: 01/10/22 1915     Extra Tube Hold for add-ons.     Comment: Auto resulted.       Gold Top - SST [[id number]] Collected: 01/10/22 1812    Specimen: Blood Updated: 01/10/22 1915     Extra Tube Hold for add-ons.     Comment: Auto resulted.       Light Blue Top [[id number]] Collected: 01/10/22 1812    Specimen: Blood Updated: 01/10/22 1915     Extra Tube hold for add-on     Comment: Auto resulted       Lavender Top [[id number]] Collected: 01/10/22 1812    Specimen: Blood Updated: 01/10/22 1915     Extra Tube hold for add-on     Comment: Auto resulted       CBC & Differential [[id number]]  (Abnormal) Collected: 01/10/22 1812    Specimen: Blood Updated: 01/10/22 1859    Narrative:      The following orders were created for panel order CBC & Differential.  Procedure                               Abnormality         Status                     ---------                               -----------         ------                     CBC Auto Differential[[id number]]        Abnormal            Final result                 Please view results for these tests on the individual orders.    CBC Auto Differential [[id number]]  (Abnormal) Collected: 01/10/22 1812    Specimen: Blood Updated: 01/10/22 1859     WBC 13.89 10*3/mm3      RBC 2.69 10*6/mm3      Hemoglobin 8.8 g/dL      Hematocrit 25.7 %      MCV 95.5 fL      MCH  32.7 pg      MCHC 34.2 g/dL      RDW 14.4 %      RDW-SD 49.6 fl      MPV 10.4 fL      Platelets 444 10*3/mm3      Neutrophil % 89.8 %      Lymphocyte % 5.4 %      Monocyte % 3.2 %      Eosinophil % 0.2 %      Basophil % 0.2 %      Immature Grans % 1.2 %      Neutrophils, Absolute 12.47 10*3/mm3      Lymphocytes, Absolute 0.75 10*3/mm3      Monocytes, Absolute 0.44 10*3/mm3      Eosinophils, Absolute 0.03 10*3/mm3      Basophils, Absolute 0.03 10*3/mm3      Immature Grans, Absolute 0.17 10*3/mm3      nRBC 0.0 /100 WBC         Imaging Results (Last 24 Hours)     Procedure Component Value Units Date/Time    CT Abdomen Pelvis Without Contrast [[id number]] Collected: 01/10/22 2206     Updated: 01/10/22 2220    Narrative:      CT OF THE ABDOMEN AND PELVIS WITHOUT CONTRAST     HISTORY: Weakness. Diarrhea.     COMPARISON: None available.     TECHNIQUE: Axial CT imaging was obtained through the abdomen and pelvis.  No IV contrast was administered.     FINDINGS:  Images through the lung bases demonstrate some dependent atelectasis.  There are old bilateral rib fractures. There is an old right inferior  pubic ramus fracture. No acute osseous abnormalities are seen. No  suspicious hepatic lesions are seen. Adrenal glands, spleen, stomach,  and duodenum are unremarkable. Adrenal glands are within normal limits,  as are the pancreas and gallbladder. Innumerable cysts are noted on the  kidneys. Kidneys are atrophic. There is no hydronephrosis. The patient  is noted to have free fluid within the abdomen and pelvis, keeping with  history of peritoneal dialysis. There is some calcification of the  aorta. There is a 4 mm nonobstructing stone within the left kidney.  Urinary bladder is decompressed, and may contain some stones within it,  versus some bladder wall calcification. This was also present on the  prior study. There is colonic diverticulosis. There is no  diverticulitis. There is no evidence of bowel obstruction.  Peritoneal  dialysis catheter is coiled within the pelvis. There is some mild  stranding along its course, particularly immediately official to its  entry site. This may represent some localized inflammation, although no  associated fluid collection is seen.       Impression:      Patient does have some mild stranding adjacent to the entry site of the  patient's peritoneal dialysis catheter into the peritoneum. This may be  related to recent exchange, although some localized inflammation is not  excluded. Correlation with any evidence of infection is recommended.     Radiation dose reduction techniques were utilized, including automated  exposure control and exposure modulation based on body size.     This report was finalized on 1/10/2022 10:17 PM by Dr. Dian Condon M.D.       CT Head Without Contrast [[id number]] Collected: 01/10/22 2155     Updated: 01/10/22 2205    Narrative:      CT HEAD WITHOUT CONTRAST     HISTORY: Syncope     COMPARISON: None available.     TECHNIQUE: Axial CT imaging was obtained through the brain. No IV  contrast was administered.     FINDINGS:  No acute intracranial hemorrhage is seen. There is diffuse atrophy.  There is periventricular and deep white matter microangiopathic change.  There is no midline shift or mass effect. No calvarial fracture is seen.  There is some minimal partial opacification of the right mastoid air  cells, with extensive opacification of the left mastoid air cells.  Correlation with any evidence of mastoiditis is suggested. Patient is  also noted to have some fluid or debris within the left middle ear.       Impression:         1. No acute intracranial findings.  2. Extensive opacification of the left mastoid air cells. Correlation  with any evidence of mastoiditis is suggested.     Radiation dose reduction techniques were utilized, including automated  exposure control and exposure modulation based on body size.     This report was finalized on 1/10/2022  10:02 PM by Dr. Dian Condon M.D.       XR Chest 1 View [[id number]] Collected: 01/10/22 1933     Updated: 01/10/22 1940    Narrative:      XR CHEST 1 VW-     Clinical: Acute mental status change, lethargy, vertigo     FINDINGS: Heart size within normal limits. Ectasia of the thoracic  aorta, mediastinum and glen otherwise satisfactory in appearance.     Fracture of the left seventh rib with associated pleural thickening or  small amount of adjacent loculated pleural fluid. No pleural effusion  seen at the left costophrenic sulcus. Linear area scarring versus  atelectasis at the left base. Subtle deformities of the right ribs  suggested, these appear likely old.     No edema or consolidation is demonstrated. The remainder is  unremarkable.     This report was finalized on 1/10/2022 7:36 PM by Dr. August Skinner M.D.                ECG 12 Lead  Component   Ref Range & Units 1/10/22 2340 12/28/21 0955   QT Interval   ms 339  395              HEART RATE= 98  bpm  RR Interval= 612  ms  [place] Interval= 153  ms  P Horizontal Axis= -2  deg  P Front Axis= 71  deg  QRSD Interval= 63  ms  QT Interval= 339  ms  QRS Axis= 13  deg  T Wave Axis= 175  deg  - ABNORMAL ECG -  Sinus rhythm  Nonspecific T abnormalities, lateral leads  When compared with ECG of 28-Dec-2021 9:55:52,  Significant rate increase otherwise no change              Current Facility-Administered Medications:   •  amLODIPine (NORVASC) tablet 5 mg, 5 mg, Oral, Daily, Matthew Mendoza MD, 5 mg at 01/11/22 1120  •  atorvastatin (LIPITOR) tablet 10 mg, 10 mg, Oral, Nightly, Matthew Mendoza MD  •  calcitriol (ROCALTROL) capsule 0.25 mcg, 0.25 mcg, Oral, Daily, Matthew Mendoza MD  •  cefTRIAXone (ROCEPHIN) 1 g in sodium chloride 0.9 % 100 mL IVPB-VTB, 1 g, Intravenous, Q24H, Matthew Mendoza MD  •  cholecalciferol (VITAMIN D3) tablet 1,000 Units, 1,000 Units, Oral, Daily, Matthew Mendoza MD  •  Delflex-LC/1.5% Dextrose (DIANEAL) CAPD 2,000 mL, 2,000 mL, Intraperitoneal, PRN,  Carlos Schwab MD, 2,000 mL at 01/11/22 1021  •  Delflex-LC/2.5% Dextrose (DIANEAL) CAPD 2,000 mL, 2,000 mL, Intraperitoneal, PRN, Carlos Schwab MD  •  HYDROmorphone (DILAUDID) injection 0.5 mg, 0.5 mg, Intravenous, Q4H PRN, Benito Schwartz MD, 0.5 mg at 01/11/22 1010  •  ondansetron (ZOFRAN) injection 4 mg, 4 mg, Intravenous, Q4H PRN, Benito Schwartz MD, 4 mg at 01/11/22 1010  •  pantoprazole (PROTONIX) injection 40 mg, 40 mg, Intravenous, Q12H, Benito Schwartz MD, 40 mg at 01/11/22 1120  •  prochlorperazine (COMPAZINE) injection 5 mg, 5 mg, Intravenous, Q6H PRN, Benito Schwartz MD, 5 mg at 01/11/22 1120  •  sodium chloride 0.9 % flush 10 mL, 10 mL, Intravenous, PRN, Alexandro Cannon MD     ASSESSMENT  Abdominal pain with nausea vomiting questionable etiology  Probable peritoneal dialysis catheter site infection with recent replacement  End-stage renal disease on peritoneal dialysis  Hypertension  Hyperlipidemia  Chronic anemia  Gastroesophageal reflux disease    PLAN  Admit  Symptomatic treatment for nausea vomiting and abdominal pain  Empiric antibiotics  General surgery consult  Nephrology to follow patient for peritoneal dialysis  Continue home medications  Stress ulcer DVT prophylaxis  Supportive care  DNR  Discussed with nursing staff  Follow closely and further recommendation current hospital course    BENITO SCHWARTZ MD

## 2022-01-11 NOTE — OUTREACH NOTE
General Surgery Week 1 Survey      Responses   [facility] patient discharged from? [facility]   Does the patient have one of the following disease processes/diagnoses(primary or secondary)? General Surgery   Revoke Readmitted          Dyan Timmons RN

## 2022-01-11 NOTE — ED NOTES
Pt was brought back from triage unresponsive. Pt was placed on bed and monitor. Central pulse palpated. Pt slowly returning back to baseline.      Justina Romero RN  01/10/22 7931

## 2022-01-12 LAB
ABO GROUP BLD: NORMAL
ALBUMIN SERPL-MCNC: 2.4 G/DL (ref 3.5–5.2)
ALBUMIN/GLOB SERPL: 1.1 G/DL
ALP SERPL-CCNC: 77 U/L (ref 39–117)
ALT SERPL W P-5'-P-CCNC: 5 U/L (ref 1–33)
ANION GAP SERPL CALCULATED.3IONS-SCNC: 16.3 MMOL/L (ref 5–15)
AST SERPL-CCNC: 12 U/L (ref 1–32)
BASOPHILS # BLD AUTO: 0.03 10*3/MM3 (ref 0–0.2)
BASOPHILS NFR BLD AUTO: 0.2 % (ref 0–1.5)
BILIRUB SERPL-MCNC: <0.2 MG/DL (ref 0–1.2)
BLD GP AB SCN SERPL QL: NEGATIVE
BUN SERPL-MCNC: 67 MG/DL (ref 8–23)
BUN/CREAT SERPL: 5.5 (ref 7–25)
CALCIUM SPEC-SCNC: 7.9 MG/DL (ref 8.6–10.5)
CHLORIDE SERPL-SCNC: 93 MMOL/L (ref 98–107)
CHOLEST SERPL-MCNC: 101 MG/DL (ref 0–200)
CO2 SERPL-SCNC: 25.7 MMOL/L (ref 22–29)
CREAT SERPL-MCNC: 12.27 MG/DL (ref 0.57–1)
CRP SERPL-MCNC: 1.99 MG/DL (ref 0–0.5)
DEPRECATED RDW RBC AUTO: 50.6 FL (ref 37–54)
EOSINOPHIL # BLD AUTO: 0.26 10*3/MM3 (ref 0–0.4)
EOSINOPHIL NFR BLD AUTO: 2 % (ref 0.3–6.2)
ERYTHROCYTE [DISTWIDTH] IN BLOOD BY AUTOMATED COUNT: 14.5 % (ref 12.3–15.4)
ERYTHROCYTE [SEDIMENTATION RATE] IN BLOOD: 22 MM/HR (ref 0–30)
GFR SERPL CREATININE-BSD FRML MDRD: 3 ML/MIN/1.73
GFR SERPL CREATININE-BSD FRML MDRD: ABNORMAL ML/MIN/{1.73_M2}
GLOBULIN UR ELPH-MCNC: 2.2 GM/DL
GLUCOSE SERPL-MCNC: 121 MG/DL (ref 65–99)
HBA1C MFR BLD: 5.39 % (ref 4.8–5.6)
HCT VFR BLD AUTO: 15.2 % (ref 34–46.6)
HCT VFR BLD AUTO: 16.7 % (ref 34–46.6)
HDLC SERPL-MCNC: 40 MG/DL (ref 40–60)
HEMOCCULT STL QL: POSITIVE
HGB BLD-MCNC: 5.1 G/DL (ref 12–15.9)
HGB BLD-MCNC: 5.4 G/DL (ref 12–15.9)
IMM GRANULOCYTES # BLD AUTO: 0.13 10*3/MM3 (ref 0–0.05)
IMM GRANULOCYTES NFR BLD AUTO: 1 % (ref 0–0.5)
LDLC SERPL CALC-MCNC: 40 MG/DL (ref 0–100)
LDLC/HDLC SERPL: 0.97 {RATIO}
LYMPHOCYTES # BLD AUTO: 1.12 10*3/MM3 (ref 0.7–3.1)
LYMPHOCYTES NFR BLD AUTO: 8.5 % (ref 19.6–45.3)
MCH RBC QN AUTO: 32.5 PG (ref 26.6–33)
MCHC RBC AUTO-ENTMCNC: 33.6 G/DL (ref 31.5–35.7)
MCV RBC AUTO: 96.8 FL (ref 79–97)
MONOCYTES # BLD AUTO: 0.81 10*3/MM3 (ref 0.1–0.9)
MONOCYTES NFR BLD AUTO: 6.1 % (ref 5–12)
NEUTROPHILS NFR BLD AUTO: 10.85 10*3/MM3 (ref 1.7–7)
NEUTROPHILS NFR BLD AUTO: 82.2 % (ref 42.7–76)
NRBC BLD AUTO-RTO: 0 /100 WBC (ref 0–0.2)
NT-PROBNP SERPL-MCNC: 4097 PG/ML (ref 0–1800)
PLATELET # BLD AUTO: 318 10*3/MM3 (ref 140–450)
PMV BLD AUTO: 10.3 FL (ref 6–12)
POTASSIUM SERPL-SCNC: 3.9 MMOL/L (ref 3.5–5.2)
PROT SERPL-MCNC: 4.6 G/DL (ref 6–8.5)
RBC # BLD AUTO: 1.57 10*6/MM3 (ref 3.77–5.28)
RH BLD: POSITIVE
SODIUM SERPL-SCNC: 135 MMOL/L (ref 136–145)
T&S EXPIRATION DATE: NORMAL
T4 FREE SERPL-MCNC: 1.2 NG/DL (ref 0.93–1.7)
TRIGL SERPL-MCNC: 112 MG/DL (ref 0–150)
TSH SERPL DL<=0.05 MIU/L-ACNC: 10.1 UIU/ML (ref 0.27–4.2)
VLDLC SERPL-MCNC: 21 MG/DL (ref 5–40)
WBC NRBC COR # BLD: 13.2 10*3/MM3 (ref 3.4–10.8)

## 2022-01-12 PROCEDURE — 80061 LIPID PANEL: CPT | Performed by: HOSPITALIST

## 2022-01-12 PROCEDURE — 80053 COMPREHEN METABOLIC PANEL: CPT | Performed by: HOSPITALIST

## 2022-01-12 PROCEDURE — 83036 HEMOGLOBIN GLYCOSYLATED A1C: CPT | Performed by: HOSPITALIST

## 2022-01-12 PROCEDURE — 85025 COMPLETE CBC W/AUTO DIFF WBC: CPT | Performed by: INTERNAL MEDICINE

## 2022-01-12 PROCEDURE — 83880 ASSAY OF NATRIURETIC PEPTIDE: CPT | Performed by: HOSPITALIST

## 2022-01-12 PROCEDURE — 84443 ASSAY THYROID STIM HORMONE: CPT | Performed by: HOSPITALIST

## 2022-01-12 PROCEDURE — 86900 BLOOD TYPING SEROLOGIC ABO: CPT | Performed by: HOSPITALIST

## 2022-01-12 PROCEDURE — 86901 BLOOD TYPING SEROLOGIC RH(D): CPT | Performed by: HOSPITALIST

## 2022-01-12 PROCEDURE — 85652 RBC SED RATE AUTOMATED: CPT | Performed by: HOSPITALIST

## 2022-01-12 PROCEDURE — 85018 HEMOGLOBIN: CPT | Performed by: HOSPITALIST

## 2022-01-12 PROCEDURE — 86140 C-REACTIVE PROTEIN: CPT | Performed by: HOSPITALIST

## 2022-01-12 PROCEDURE — 86900 BLOOD TYPING SEROLOGIC ABO: CPT

## 2022-01-12 PROCEDURE — 86923 COMPATIBILITY TEST ELECTRIC: CPT

## 2022-01-12 PROCEDURE — 36430 TRANSFUSION BLD/BLD COMPNT: CPT

## 2022-01-12 PROCEDURE — 82272 OCCULT BLD FECES 1-3 TESTS: CPT | Performed by: HOSPITALIST

## 2022-01-12 PROCEDURE — 86901 BLOOD TYPING SEROLOGIC RH(D): CPT

## 2022-01-12 PROCEDURE — 25010000002 HYDROMORPHONE PER 4 MG: Performed by: HOSPITALIST

## 2022-01-12 PROCEDURE — 85014 HEMATOCRIT: CPT | Performed by: HOSPITALIST

## 2022-01-12 PROCEDURE — 86850 RBC ANTIBODY SCREEN: CPT | Performed by: HOSPITALIST

## 2022-01-12 PROCEDURE — P9016 RBC LEUKOCYTES REDUCED: HCPCS

## 2022-01-12 PROCEDURE — 84439 ASSAY OF FREE THYROXINE: CPT | Performed by: HOSPITALIST

## 2022-01-12 RX ORDER — HYDROCODONE BITARTRATE AND ACETAMINOPHEN 5; 325 MG/1; MG/1
1 TABLET ORAL EVERY 6 HOURS PRN
Status: DISCONTINUED | OUTPATIENT
Start: 2022-01-12 | End: 2022-01-16

## 2022-01-12 RX ORDER — LEVOTHYROXINE SODIUM 0.03 MG/1
25 TABLET ORAL
Status: DISCONTINUED | OUTPATIENT
Start: 2022-01-13 | End: 2022-01-16 | Stop reason: HOSPADM

## 2022-01-12 RX ADMIN — PANTOPRAZOLE SODIUM 40 MG: 40 INJECTION, POWDER, FOR SOLUTION INTRAVENOUS at 20:07

## 2022-01-12 RX ADMIN — DEXTROSE MONOHYDRATE, SODIUM CHLORIDE, SODIUM LACTATE, CALCIUM CHLORIDE, MAGNESIUM CHLORIDE 2000 ML: 1.5; 538; 448; 18.4; 5.08 SOLUTION INTRAPERITONEAL at 06:20

## 2022-01-12 RX ADMIN — Medication 1000 UNITS: at 09:08

## 2022-01-12 RX ADMIN — HYDROMORPHONE HYDROCHLORIDE 0.5 MG: 1 INJECTION, SOLUTION INTRAMUSCULAR; INTRAVENOUS; SUBCUTANEOUS at 07:06

## 2022-01-12 RX ADMIN — PANTOPRAZOLE SODIUM 40 MG: 40 INJECTION, POWDER, FOR SOLUTION INTRAVENOUS at 09:08

## 2022-01-12 RX ADMIN — CALCITRIOL 0.25 MCG: 0.25 CAPSULE ORAL at 09:08

## 2022-01-12 RX ADMIN — DEXTROSE MONOHYDRATE, SODIUM CHLORIDE, SODIUM LACTATE, CALCIUM CHLORIDE, MAGNESIUM CHLORIDE 2000 ML: 1.5; 538; 448; 18.4; 5.08 SOLUTION INTRAPERITONEAL at 22:34

## 2022-01-12 RX ADMIN — HYDROCODONE BITARTRATE AND ACETAMINOPHEN 1 TABLET: 5; 325 TABLET ORAL at 16:00

## 2022-01-12 NOTE — PROGRESS NOTES
"Daily progress note    Complaint complaint  Doing better  Still with mild abdominal pain but no nausea vomiting diarrhea  Wants to eat regular diet  Still feels weak and looks pale    History of present illness  76 white female with history of end-stage renal disease on peritoneal dialysis hypertension hyperlipidemia and chronic anemia presented to [facility] emergency room with abdominal pain with dry heaving nausea and vomited couple of times.  Patient denies any diarrhea but had 1 episode.  Patient denies any fever or chills.  Patient denies any black stools or blood in the stools.  Patient work-up in ER revealed persistent nausea vomiting secondary to uremia and possible infection secondary to peritonitis catheter admitted for management     REVIEW OF SYSTEMS  Constitutional: Positive for appetite change. Negative for chills and fever.   Respiratory: Negative for cough and shortness of breath.    Cardiovascular: Negative for chest pain and palpitations.   Gastrointestinal: Positive for abdominal pain nausea and vomiting Negative for blood in stool,   Neurological: Negative for headaches.      PHYSICAL EXAM   Blood pressure 95/73, pulse 89, temperature 97.9 °F (36.6 °C), temperature source Oral, resp. rate 18, height 160 cm (63\"), weight 51 kg (112 lb 7 oz), SpO2 99 %.    Constitutional:  Awake and alert in mild distress secondary to nausea and pain  HENT: Normocephalic and atraumatic. Oropharynx moist/nonerythematous.  Neck: Normal range of motion. Neck supple. No JVD present.   Cardiovascular: Normal rate, regular rhythm and normal heart sounds.  Pulmonary/Chest: Effort normal and breath sounds normal. No stridor. No wheezes, no rales.   Abdominal:  Soft mild diffuse tenderness bowel sounds positive  Musculoskeletal: Normal range of motion. No edema, tenderness or deformity.   Neurological: Pt. is alert and oriented to person, place, and time. No focal neurologic deficits  Skin: Skin is warm and dry. " No rash noted. Pt. is not diaphoretic. No erythema.   Psychiatric: Mood, affect and judgment normal.  She is pleasant and cooperative.    LAB RESULTS  Lab Results (last 24 hours)     Procedure Component Value Units Date/Time    Hemoglobin & Hematocrit, Blood [[id number]] Collected: 01/12/22 1206    Specimen: Blood Updated: 01/12/22 1217    C-reactive Protein [[id number]]  (Abnormal) Collected: 01/12/22 0834    Specimen: Blood Updated: 01/12/22 1048     C-Reactive Protein 1.99 mg/dL     Comprehensive Metabolic Panel [[id number]]  (Abnormal) Collected: 01/12/22 0834    Specimen: Blood Updated: 01/12/22 1031     Glucose 121 mg/dL      BUN 67 mg/dL      Creatinine 12.27 mg/dL      Sodium 135 mmol/L      Potassium 3.9 mmol/L      Chloride 93 mmol/L      CO2 25.7 mmol/L      Calcium 7.9 mg/dL      Total Protein 4.6 g/dL      Albumin 2.40 g/dL      ALT (SGPT) 5 U/L      AST (SGOT) 12 U/L      Alkaline Phosphatase 77 U/L      Total Bilirubin <0.2 mg/dL      eGFR Non African Amer 3 mL/min/1.73      Comment: <15 Indicative of kidney failure.        eGFR   Amer --     Comment: <15 Indicative of kidney failure.        Globulin 2.2 gm/dL      A/G Ratio 1.1 g/dL      BUN/Creatinine Ratio 5.5     Anion Gap 16.3 mmol/L     Narrative:      GFR Normal >60  Chronic Kidney Disease <60  Kidney Failure <15      Lipid Panel [[id number]] Collected: 01/12/22 0834    Specimen: Blood Updated: 01/12/22 1025     Total Cholesterol 101 mg/dL      Triglycerides 112 mg/dL      HDL Cholesterol 40 mg/dL      LDL Cholesterol  40 mg/dL      VLDL Cholesterol 21 mg/dL      LDL/HDL Ratio 0.97    Narrative:      Cholesterol Reference Ranges  (U.S. Department of Health and Human Services ATP III Classifications)    Desirable          <200 mg/dL  Borderline High    200-239 mg/dL  High Risk          >240 mg/dL      Triglyceride Reference Ranges  (U.S. Department of Health and Human Services ATP III Classifications)    Normal           <150  mg/dL  Borderline High  150-199 mg/dL  High             200-499 mg/dL  Very High        >500 mg/dL    HDL Reference Ranges  (U.S. Department of Health and Human Services ATP III Classifcations)    Low     <40 mg/dl (major risk factor for CHD)  High    >60 mg/dl ('negative' risk factor for CHD)        LDL Reference Ranges  (U.S. Department of Health and Human Services ATP III Classifcations)    Optimal          <100 mg/dL  Near Optimal     100-129 mg/dL  Borderline High  130-159 mg/dL  High             160-189 mg/dL  Very High        >189 mg/dL    BNP [[id number]]  (Abnormal) Collected: 01/12/22 0834    Specimen: Blood Updated: 01/12/22 1013     proBNP 4,097.0 pg/mL     Narrative:      Among patients with dyspnea, NT-proBNP is highly sensitive for the detection of acute congestive heart failure. In addition NT-proBNP of <300 pg/ml effectively rules out acute congestive heart failure with 99% negative predictive value.    Results may be falsely decreased if patient taking Biotin.      TSH [[id number]]  (Abnormal) Collected: 01/12/22 0834    Specimen: Blood Updated: 01/12/22 1013     TSH 10.100 uIU/mL     Sedimentation Rate [[id number]]  (Normal) Collected: 01/12/22 0834    Specimen: Blood Updated: 01/12/22 1012     Sed Rate 22 mm/hr     Body Fluid Culture - Body Fluid, Peritoneum [[id number]] Collected: 01/11/22 0009    Specimen: Body Fluid from Peritoneum Updated: 01/12/22 1000     Body Fluid Culture No growth     Gram Stain No WBCs or organisms seen    Hemoglobin A1c [[id number]]  (Normal) Collected: 01/12/22 0834    Specimen: Blood Updated: 01/12/22 0953     Hemoglobin A1C 5.39 %     Narrative:      Hemoglobin A1C Ranges:    Increased Risk for Diabetes  5.7% to 6.4%  Diabetes                     >= 6.5%  Diabetic Goal                < 7.0%    CBC & Differential [[id number]]  (Abnormal) Collected: 01/12/22 0834    Specimen: Blood Updated: 01/12/22 0940    Narrative:      The following orders were created for panel order  CBC & Differential.  Procedure                               Abnormality         Status                     ---------                               -----------         ------                     CBC Auto Differential[[id number]]        Abnormal            Final result                 Please view results for these tests on the individual orders.    CBC Auto Differential [[id number]]  (Abnormal) Collected: 01/12/22 0834    Specimen: Blood Updated: 01/12/22 0940     WBC 13.20 10*3/mm3      RBC 1.57 10*6/mm3      Hemoglobin 5.1 g/dL      Hematocrit 15.2 %      MCV 96.8 fL      MCH 32.5 pg      MCHC 33.6 g/dL      RDW 14.5 %      RDW-SD 50.6 fl      MPV 10.3 fL      Platelets 318 10*3/mm3      Neutrophil % 82.2 %      Lymphocyte % 8.5 %      Monocyte % 6.1 %      Eosinophil % 2.0 %      Basophil % 0.2 %      Immature Grans % 1.0 %      Neutrophils, Absolute 10.85 10*3/mm3      Lymphocytes, Absolute 1.12 10*3/mm3      Monocytes, Absolute 0.81 10*3/mm3      Eosinophils, Absolute 0.26 10*3/mm3      Basophils, Absolute 0.03 10*3/mm3      Immature Grans, Absolute 0.13 10*3/mm3      nRBC 0.0 /100 WBC         Imaging Results (Last 24 Hours)     ** No results found for the last 24 hours. **             ECG 12 Lead  Component   Ref Range & Units 1/10/22 2340 12/28/21 0955   QT Interval   ms 339  395              HEART RATE= 98  bpm  RR Interval= 612  ms  MT Interval= 153  ms  P Horizontal Axis= -2  deg  P Front Axis= 71  deg  QRSD Interval= 63  ms  QT Interval= 339  ms  QRS Axis= 13  deg  T Wave Axis= 175  deg  - ABNORMAL ECG -  Sinus rhythm  Nonspecific T abnormalities, lateral leads  When compared with ECG of 28-Dec-2021 9:55:52,  Significant rate increase otherwise no change              Current Facility-Administered Medications:   •  calcitriol (ROCALTROL) capsule 0.25 mcg, 0.25 mcg, Oral, Daily, Matthew Mendoza MD, 0.25 mcg at 01/12/22 0908  •  cholecalciferol (VITAMIN D3) tablet 1,000 Units, 1,000 Units, Oral, Daily, Reji  MD Benito, 1,000 Units at 01/12/22 0908  •  Delflex-LC/1.5% Dextrose (DIANEAL) CAPD 2,000 mL, 2,000 mL, Intraperitoneal, PRN, Carlos Schwab MD, 2,000 mL at 01/12/22 0620  •  Delflex-LC/2.5% Dextrose (DIANEAL) CAPD 2,000 mL, 2,000 mL, Intraperitoneal, PRN, Carlos Schwab MD, 2,000 mL at 01/11/22 2203  •  HYDROmorphone (DILAUDID) injection 0.5 mg, 0.5 mg, Intravenous, Q4H PRN, Benito Schwartz MD, 0.5 mg at 01/12/22 0706  •  ondansetron (ZOFRAN) injection 4 mg, 4 mg, Intravenous, Q4H PRN, Benito Schwartz MD, 4 mg at 01/11/22 1823  •  pantoprazole (PROTONIX) injection 40 mg, 40 mg, Intravenous, Q12H, Benito Schwartz MD, 40 mg at 01/12/22 0908  •  prochlorperazine (COMPAZINE) injection 5 mg, 5 mg, Intravenous, Q6H PRN, Benito Schwartz MD, 5 mg at 01/11/22 1120  •  sodium chloride 0.9 % flush 10 mL, 10 mL, Intravenous, PRN, Alexandro Cannon MD, 10 mL at 01/11/22 2152     ASSESSMENT  Abdominal pain with nausea vomiting resolving  No evidence of peritoneal dialysis catheter site infection  End-stage renal disease on peritoneal dialysis  Hypertension  Hyperlipidemia  Hypothyroidism  Chronic anemia with drop in H&H  Gastroesophageal reflux disease    PLAN  CPM  Transfused 2 units packed RBC  Check Hemoccult  Discontinue antibiotics  Symptomatic treatment for nausea vomiting and abdominal pain  General surgery consult appreciated  Nephrology to follow patient for peritoneal dialysis  Continue home medications  Stress ulcer DVT prophylaxis  Supportive care  DNR  Discussed with nursing staff  Follow closely and further recommendation current hospital course    BENITO SCHWARTZ MD

## 2022-01-12 NOTE — NURSING NOTE
Lab called for critical hemoglobin at 5.1 and critical hematocrit at 15.2. On 1/10 hgb 8.8. No labs 1/11. Recheck ordered. Dr. Mendoza notified. Orders for 2 units PRBC's if hgb recheck is less than 8. WCTM.

## 2022-01-12 NOTE — PLAN OF CARE
Patient upgraded to bland/gi soft diet, tolerating well. Hgb was 5.4 this shift. Orders for 2 units PRBC's placed by attending MD. Unit 1 currently infusing. Hem occult stool POSITIVE. GI consulted. Patient c/o abd pain, PRN norco given per orders. VSS, though slightly hypotensive. PD per orders. 1400 exchange late due to warmer malfunction. Sister at bedside at this time, updated about plan of care. WC.         Problem: Adult Inpatient Plan of Care  Goal: Plan of Care Review  Outcome: Ongoing, Progressing  Flowsheets  Taken 1/12/2022 1615 by Kylah Reyez, RN  Progress: improving  Taken 1/12/2022 0505 by Evelyn Butterfield, RN  Plan of Care Reviewed With: patient   Goal Outcome Evaluation:           Progress: improving

## 2022-01-12 NOTE — PLAN OF CARE
Goal Outcome Evaluation:  Plan of Care Reviewed With: patient  Progress: improving  Outcome Summary: All needs met. Assist x 1. Clear liquid diet, low Na and K+. VSS. PD performed per orders. A&O x 4. On room air. NSR on the monitor. IV anitbiotics. No complaints. Will CTM.

## 2022-01-12 NOTE — PROGRESS NOTES
[facility] Progress Note      Patient Name: Fany Todd  : 1945  MRN: [id number]  Primary Care Physician:  Lilibeth Cota MD  Date of admission: 1/10/2022    Subjective     Interval History:   Follow-up end-stage renal disease on peritoneal dialysis    The patient is feeling much better he complains of being hungry, no nausea no vomiting no abdominal pain, no lightheadedness she drained earlier this morning with the overnight exchanged 3 L while only 2 L were instilled so she had excellent ultrafiltration    Review of Systems:   As noted above    Objective     Vitals:   Temp:  [97.6 °F (36.4 °C)-98.7 °F (37.1 °C)] 97.9 °F (36.6 °C)  Heart Rate:  [73-89] 89  Resp:  [18] 18  BP: ()/(46-73) 95/73    Intake/Output Summary (Last 24 hours) at 2022 0836  Last data filed at 2022 0748  Gross per 24 hour   Intake 46050 ml   Output 33865 ml   Net -190 ml       Physical Exam:    Constitutional: Awake, alert, no acute distress.  Appears to be chronically ill and frail  HEENT: Sclera anicteric, no conjunctival injection, oral mucosa slightly dry  Neck: Supple, no thyromegaly, no lymphadenopathy, trachea at midline, no JVD  Respiratory: Clear to auscultation bilaterally, nonlabored respiration  Cardiovascular: RRR, no murmurs, no rubs or gallops, no carotid bruit  Gastrointestinal: Positive bowel sounds, abdomen is soft, nontender and nondistended, PD catheter in place exit site is clean there is no tunnel tenderness  : No palpable bladder  Musculoskeletal: No edema, no clubbing or cyanosis  Neurologic: Oriented x3, moving all extremities, normal speech and mental status  Skin: Warm and dry     Scheduled Meds:     amLODIPine, 5 mg, Oral, Daily  atorvastatin, 10 mg, Oral, Nightly  calcitriol, 0.25 mcg, Oral, Daily  cefTRIAXone, 1 g, Intravenous, Q24H  cholecalciferol, 1,000 Units, Oral, Daily  pantoprazole, 40 mg, Intravenous, Q12H      IV Meds:        Results  Reviewed:   I have personally reviewed the results from the time of this admission to 1/12/2022 08:36 EST     Results from last 7 days   Lab Units 01/10/22  1812   SODIUM mmol/L 137   POTASSIUM mmol/L 5.1   CHLORIDE mmol/L 91*   CO2 mmol/L 24.7   BUN mg/dL 78*   CREATININE mg/dL 12.19*   CALCIUM mg/dL 8.7   BILIRUBIN mg/dL 0.2   ALK PHOS U/L 107   ALT (SGPT) U/L 7   AST (SGOT) U/L 13   GLUCOSE mg/dL 132*       Estimated Creatinine Clearance: 3.2 mL/min (A) (by C-G formula based on SCr of 12.19 mg/dL (H)).    Results from last 7 days   Lab Units 01/10/22  1812   MAGNESIUM mg/dL 1.8             Results from last 7 days   Lab Units 01/10/22  1812   WBC 10*3/mm3 13.89*   HEMOGLOBIN g/dL 8.8*   PLATELETS 10*3/mm3 444             Assessment / Plan     ASSESSMENT:  1. ESRD, patient has poor appetite with what appears to be uremic symptoms with inadequate peritoneal dialysis because we are using low volume because of the new catheter placement less than 2 weeks ago.  No evidence of peritonitis, she had the following dialysate volume yesterday and the patient is feeling better, this a.m. lab results still pending  2.  Hypertension controlled  3.  Anemia of chronic kidney disease, hemoglobin yesterday was 8.8, the patient on long-acting PAOLO  4. history of breast cancer of the left breast with mastectomy treated surgically with no evidence of recurrent    PLAN:  1. continue the same treatment  2.  Check labs results from this morning and adjust treatment as needed  3.  If the patient continues to improve and her chemistry within acceptable range she could be discharged home from the renal standpoint    Thank you for involving us in the care of Fany Todd.  Please feel free to call with any questions.    Carlos Schwab MD  01/12/22  08:36 EST    [facility]  [phone]      Much of this encounter note is an electronic transcription/translation of spoken language to printed text. The  electronic translation of spoken language may permit erroneous, or at times, nonsensical words or phrases to be inadvertently transcribed; Although I have reviewed the note for such errors, some may still exist.

## 2022-01-13 LAB
ANION GAP SERPL CALCULATED.3IONS-SCNC: 13.7 MMOL/L (ref 5–15)
BASOPHILS # BLD AUTO: 0.05 10*3/MM3 (ref 0–0.2)
BASOPHILS NFR BLD AUTO: 0.4 % (ref 0–1.5)
BH BB BLOOD EXPIRATION DATE: NORMAL
BH BB BLOOD EXPIRATION DATE: NORMAL
BH BB BLOOD TYPE BARCODE: 6200
BH BB BLOOD TYPE BARCODE: 6200
BH BB DISPENSE STATUS: NORMAL
BH BB DISPENSE STATUS: NORMAL
BH BB PRODUCT CODE: NORMAL
BH BB PRODUCT CODE: NORMAL
BH BB UNIT NUMBER: NORMAL
BH BB UNIT NUMBER: NORMAL
BUN SERPL-MCNC: 54 MG/DL (ref 8–23)
BUN/CREAT SERPL: 5.2 (ref 7–25)
CALCIUM SPEC-SCNC: 8.4 MG/DL (ref 8.6–10.5)
CHLORIDE SERPL-SCNC: 95 MMOL/L (ref 98–107)
CO2 SERPL-SCNC: 27.3 MMOL/L (ref 22–29)
CREAT SERPL-MCNC: 10.34 MG/DL (ref 0.57–1)
CROSSMATCH INTERPRETATION: NORMAL
CROSSMATCH INTERPRETATION: NORMAL
DEPRECATED RDW RBC AUTO: 58.8 FL (ref 37–54)
EOSINOPHIL # BLD AUTO: 0.39 10*3/MM3 (ref 0–0.4)
EOSINOPHIL NFR BLD AUTO: 3.3 % (ref 0.3–6.2)
ERYTHROCYTE [DISTWIDTH] IN BLOOD BY AUTOMATED COUNT: 17.8 % (ref 12.3–15.4)
GFR SERPL CREATININE-BSD FRML MDRD: 4 ML/MIN/1.73
GFR SERPL CREATININE-BSD FRML MDRD: ABNORMAL ML/MIN/{1.73_M2}
GLUCOSE SERPL-MCNC: 110 MG/DL (ref 65–99)
HCT VFR BLD AUTO: 27.1 % (ref 34–46.6)
HGB BLD-MCNC: 8.9 G/DL (ref 12–15.9)
IMM GRANULOCYTES # BLD AUTO: 0.11 10*3/MM3 (ref 0–0.05)
IMM GRANULOCYTES NFR BLD AUTO: 0.9 % (ref 0–0.5)
LYMPHOCYTES # BLD AUTO: 1.13 10*3/MM3 (ref 0.7–3.1)
LYMPHOCYTES NFR BLD AUTO: 9.5 % (ref 19.6–45.3)
MCH RBC QN AUTO: 29.5 PG (ref 26.6–33)
MCHC RBC AUTO-ENTMCNC: 32.8 G/DL (ref 31.5–35.7)
MCV RBC AUTO: 89.7 FL (ref 79–97)
MONOCYTES # BLD AUTO: 0.76 10*3/MM3 (ref 0.1–0.9)
MONOCYTES NFR BLD AUTO: 6.4 % (ref 5–12)
NEUTROPHILS NFR BLD AUTO: 79.5 % (ref 42.7–76)
NEUTROPHILS NFR BLD AUTO: 9.44 10*3/MM3 (ref 1.7–7)
NRBC BLD AUTO-RTO: 0 /100 WBC (ref 0–0.2)
PLATELET # BLD AUTO: 317 10*3/MM3 (ref 140–450)
PMV BLD AUTO: 9.9 FL (ref 6–12)
POTASSIUM SERPL-SCNC: 4.1 MMOL/L (ref 3.5–5.2)
RBC # BLD AUTO: 3.02 10*6/MM3 (ref 3.77–5.28)
SODIUM SERPL-SCNC: 136 MMOL/L (ref 136–145)
UNIT  ABO: NORMAL
UNIT  ABO: NORMAL
UNIT  RH: NORMAL
UNIT  RH: NORMAL
WBC NRBC COR # BLD: 11.88 10*3/MM3 (ref 3.4–10.8)

## 2022-01-13 PROCEDURE — 80048 BASIC METABOLIC PNL TOTAL CA: CPT | Performed by: HOSPITALIST

## 2022-01-13 PROCEDURE — 85025 COMPLETE CBC W/AUTO DIFF WBC: CPT | Performed by: HOSPITALIST

## 2022-01-13 PROCEDURE — 99222 1ST HOSP IP/OBS MODERATE 55: CPT | Performed by: INTERNAL MEDICINE

## 2022-01-13 RX ADMIN — CALCITRIOL 0.25 MCG: 0.25 CAPSULE ORAL at 08:18

## 2022-01-13 RX ADMIN — PANTOPRAZOLE SODIUM 40 MG: 40 INJECTION, POWDER, FOR SOLUTION INTRAVENOUS at 08:18

## 2022-01-13 RX ADMIN — PANTOPRAZOLE SODIUM 40 MG: 40 INJECTION, POWDER, FOR SOLUTION INTRAVENOUS at 20:08

## 2022-01-13 RX ADMIN — HYDROCODONE BITARTRATE AND ACETAMINOPHEN 1 TABLET: 5; 325 TABLET ORAL at 05:15

## 2022-01-13 RX ADMIN — SODIUM CHLORIDE, PRESERVATIVE FREE 10 ML: 5 INJECTION INTRAVENOUS at 08:18

## 2022-01-13 RX ADMIN — DEXTROSE MONOHYDRATE, SODIUM CHLORIDE, SODIUM LACTATE, CALCIUM CHLORIDE, MAGNESIUM CHLORIDE 2000 ML: 2.5; 538; 448; 18.4; 5.08 SOLUTION INTRAPERITONEAL at 22:04

## 2022-01-13 RX ADMIN — Medication 1000 UNITS: at 08:18

## 2022-01-13 RX ADMIN — DEXTROSE MONOHYDRATE, SODIUM CHLORIDE, SODIUM LACTATE, CALCIUM CHLORIDE, MAGNESIUM CHLORIDE 2000 ML: 2.5; 538; 448; 18.4; 5.08 SOLUTION INTRAPERITONEAL at 06:05

## 2022-01-13 RX ADMIN — LEVOTHYROXINE SODIUM 25 MCG: 0.03 TABLET ORAL at 05:15

## 2022-01-13 NOTE — PROGRESS NOTES
[facility] Progress Note      Patient Name: Fany Todd  : 1945  MRN: [id number]  Primary Care Physician:  Lilibeth Cota MD  Date of admission: 1/10/2022    Subjective     Interval History:   Follow-up end-stage renal disease on peritoneal dialysis    The patient is feeling much better today, denies any chest pain or shortness of air, no orthopnea or PND, no nausea or vomiting, no abdominal pain, no dysuria or gross hematuria, denies melena or hematochezia. Her hemoglobin yesterday was 5.1 she received 2 units of packed RBCs. She is tolerating her peritoneal dialysis. Today's labs still pending    Review of Systems:   As noted above    Objective     Vitals:   Temp:  [97.7 °F (36.5 °C)-98.7 °F (37.1 °C)] 98.7 °F (37.1 °C)  Heart Rate:  [] 82  Resp:  [16-18] 16  BP: ()/(41-96) 118/64    Intake/Output Summary (Last 24 hours) at 2022 0754  Last data filed at 2022 0653  Gross per 24 hour   Intake 53852 ml   Output 78738 ml   Net -455 ml       Physical Exam:    Constitutional: Awake, alert, no acute distress.  Appears to be chronically ill and frail  HEENT: Sclera anicteric, no conjunctival injection, oral mucosa slightly dry  Neck: Supple, no thyromegaly, no lymphadenopathy, trachea at midline, no JVD  Respiratory: Clear to auscultation bilaterally, nonlabored respiration  Cardiovascular: RRR, no murmurs, no rubs or gallops, no carotid bruit  Gastrointestinal: Positive bowel sounds, abdomen is soft, nontender and nondistended, PD catheter in place exit site is clean there is no tunnel tenderness  : No palpable bladder  Musculoskeletal: No edema, no clubbing or cyanosis  Neurologic: Oriented x3, moving all extremities, normal speech and mental status  Skin: Warm and dry     Scheduled Meds:     calcitriol, 0.25 mcg, Oral, Daily  cholecalciferol, 1,000 Units, Oral, Daily  levothyroxine, 25 mcg, Oral, Q AM  pantoprazole, 40 mg, Intravenous, Q12H      IV  Meds:        Results Reviewed:   I have personally reviewed the results from the time of this admission to 1/13/2022 07:54 EST     Results from last 7 days   Lab Units 01/12/22  0834 01/10/22  1812   SODIUM mmol/L 135* 137   POTASSIUM mmol/L 3.9 5.1   CHLORIDE mmol/L 93* 91*   CO2 mmol/L 25.7 24.7   BUN mg/dL 67* 78*   CREATININE mg/dL 12.27* 12.19*   CALCIUM mg/dL 7.9* 8.7   BILIRUBIN mg/dL <0.2 0.2   ALK PHOS U/L 77 107   ALT (SGPT) U/L 5 7   AST (SGOT) U/L 12 13   GLUCOSE mg/dL 121* 132*       Estimated Creatinine Clearance: 3.2 mL/min (A) (by C-G formula based on SCr of 12.27 mg/dL (H)).    Results from last 7 days   Lab Units 01/10/22  1812   MAGNESIUM mg/dL 1.8             Results from last 7 days   Lab Units 01/12/22  1206 01/12/22  0834 01/10/22  1812   WBC 10*3/mm3  --  13.20* 13.89*   HEMOGLOBIN g/dL 5.4* 5.1* 8.8*   PLATELETS 10*3/mm3  --  318 444             Assessment / Plan     ASSESSMENT:  1. ESRD, patient has poor appetite with what appears to be uremic symptoms with inadequate peritoneal dialysis because we are using low volume because of the new catheter placement less than 2 weeks ago.  No evidence of peritonitis, feeling much better tolerating her dialysis without any difficulties chemistry this morning still pending  2.  Hypertension controlled  3.  Anemia of chronic kidney disease, hemoglobin yesterday was down to 5.1, she received 2 units of packed RBCs, the patient on long-acting PAOLO  4. history of breast cancer of the left breast with mastectomy treated surgically with no evidence of recurrent    PLAN:  1. continue the same treatment  2. Check her labs and adjust treatment as needed    Thank you for involving us in the care of Fany Todd.  Please feel free to call with any questions.    Carlos Schwab MD  01/13/22  07:54 EST    [facility]  [phone]      Much of this encounter note is an electronic transcription/translation of spoken language to printed  text. The electronic translation of spoken language may permit erroneous, or at times, nonsensical words or phrases to be inadvertently transcribed; Although I have reviewed the note for such errors, some may still exist.

## 2022-01-13 NOTE — PROGRESS NOTES
"Daily progress note    Complaint complaint  Doing better  No new complaints  Denies any abdominal pain nausea vomiting diarrhea    History of present illness  76 white female with history of end-stage renal disease on peritoneal dialysis hypertension hyperlipidemia and chronic anemia presented to [facility] emergency room with abdominal pain with dry heaving nausea and vomited couple of times.  Patient denies any diarrhea but had 1 episode.  Patient denies any fever or chills.  Patient denies any black stools or blood in the stools.  Patient work-up in ER revealed persistent nausea vomiting secondary to uremia and possible infection secondary to peritonitis catheter admitted for management     REVIEW OF SYSTEMS  Unremarkable except generalized weakness    PHYSICAL EXAM   Blood pressure 136/81, pulse 88, temperature 98 °F (36.7 °C), temperature source Oral, resp. rate 16, height 160 cm (63\"), weight 51.9 kg (114 lb 6.7 oz), SpO2 99 %.    Constitutional:  Awake and alert in mild distress secondary to nausea and pain  HENT: Normocephalic and atraumatic. Oropharynx moist/nonerythematous.  Neck: Normal range of motion. Neck supple. No JVD present.   Cardiovascular: Normal rate, regular rhythm and normal heart sounds.  Pulmonary/Chest: Effort normal and breath sounds normal. No stridor. No wheezes, no rales.   Abdominal:  Soft mild diffuse tenderness bowel sounds positive  Musculoskeletal: Normal range of motion. No edema, tenderness or deformity.   Neurological: Pt. is alert and oriented to person, place, and time. No focal neurologic deficits  Skin: Skin is warm and dry. No rash noted. Pt. is not diaphoretic. No erythema.   Psychiatric: Mood, affect and judgment normal.  She is pleasant and cooperative.    LAB RESULTS  Lab Results (last 24 hours)     Procedure Component Value Units Date/Time    Basic Metabolic Panel [[id number]]  (Abnormal) Collected: 01/13/22 0941    Specimen: Blood Updated: 01/13/22 1037     " Glucose 110 mg/dL      BUN 54 mg/dL      Creatinine 10.34 mg/dL      Sodium 136 mmol/L      Potassium 4.1 mmol/L      Chloride 95 mmol/L      CO2 27.3 mmol/L      Calcium 8.4 mg/dL      eGFR   Amer --     Comment: <15 Indicative of kidney failure.        eGFR Non African Amer 4 mL/min/1.73      Comment: <15 Indicative of kidney failure.        BUN/Creatinine Ratio 5.2     Anion Gap 13.7 mmol/L     Narrative:      GFR Normal >60  Chronic Kidney Disease <60  Kidney Failure <15      CBC & Differential [[id number]]  (Abnormal) Collected: 01/13/22 0941    Specimen: Blood Updated: 01/13/22 1022    Narrative:      The following orders were created for panel order CBC & Differential.  Procedure                               Abnormality         Status                     ---------                               -----------         ------                     CBC Auto Differential[[id number]]        Abnormal            Final result                 Please view results for these tests on the individual orders.    CBC Auto Differential [[id number]]  (Abnormal) Collected: 01/13/22 0941    Specimen: Blood Updated: 01/13/22 1022     WBC 11.88 10*3/mm3      RBC 3.02 10*6/mm3      Hemoglobin 8.9 g/dL      Hematocrit 27.1 %      MCV 89.7 fL      MCH 29.5 pg      MCHC 32.8 g/dL      RDW 17.8 %      RDW-SD 58.8 fl      MPV 9.9 fL      Platelets 317 10*3/mm3      Neutrophil % 79.5 %      Lymphocyte % 9.5 %      Monocyte % 6.4 %      Eosinophil % 3.3 %      Basophil % 0.4 %      Immature Grans % 0.9 %      Neutrophils, Absolute 9.44 10*3/mm3      Lymphocytes, Absolute 1.13 10*3/mm3      Monocytes, Absolute 0.76 10*3/mm3      Eosinophils, Absolute 0.39 10*3/mm3      Basophils, Absolute 0.05 10*3/mm3      Immature Grans, Absolute 0.11 10*3/mm3      nRBC 0.0 /100 WBC     Body Fluid Culture - Body Fluid, Peritoneum [[id number]] Collected: 01/11/22 0009    Specimen: Body Fluid from Peritoneum Updated: 01/13/22 0733     Body Fluid Culture No  growth at 2 days     Gram Stain No WBCs or organisms seen    T4, Free [[id number]]  (Normal) Collected: 01/12/22 0834    Specimen: Blood Updated: 01/12/22 2047     Free T4 1.20 ng/dL     Narrative:      Results may be falsely increased if patient taking Biotin.      Occult Blood X 1, Stool - Stool, Per Rectum [[id number]]  (Abnormal) Collected: 01/12/22 1516    Specimen: Stool from Per Rectum Updated: 01/12/22 1602     Fecal Occult Blood Positive        Imaging Results (Last 24 Hours)     ** No results found for the last 24 hours. **             ECG 12 Lead  Component   Ref Range & Units 1/10/22 2340 12/28/21 0955   QT Interval   ms 339  395              HEART RATE= 98  bpm  RR Interval= 612  ms  CT Interval= 153  ms  P Horizontal Axis= -2  deg  P Front Axis= 71  deg  QRSD Interval= 63  ms  QT Interval= 339  ms  QRS Axis= 13  deg  T Wave Axis= 175  deg  - ABNORMAL ECG -  Sinus rhythm  Nonspecific T abnormalities, lateral leads  When compared with ECG of 28-Dec-2021 9:55:52,  Significant rate increase otherwise no change              Current Facility-Administered Medications:   •  calcitriol (ROCALTROL) capsule 0.25 mcg, 0.25 mcg, Oral, Daily, Matthew Mendoza MD, 0.25 mcg at 01/13/22 0818  •  cholecalciferol (VITAMIN D3) tablet 1,000 Units, 1,000 Units, Oral, Daily, Matthew Mendoza MD, 1,000 Units at 01/13/22 0818  •  Delflex-LC/1.5% Dextrose (DIANEAL) CAPD 2,000 mL, 2,000 mL, Intraperitoneal, PRN, Carlos Schwab MD, 2,000 mL at 01/12/22 2234  •  Delflex-LC/2.5% Dextrose (DIANEAL) CAPD 2,000 mL, 2,000 mL, Intraperitoneal, PRN, Carlos Schwab MD, 2,000 mL at 01/13/22 0605  •  HYDROcodone-acetaminophen (NORCO) 5-325 MG per tablet 1 tablet, 1 tablet, Oral, Q6H PRN, Matthew Mendoza MD, 1 tablet at 01/13/22 0515  •  levothyroxine (SYNTHROID, LEVOTHROID) tablet 25 mcg, 25 mcg, Oral, Q AM, Matthew Mendoza MD, 25 mcg at 01/13/22 0515  •  ondansetron (ZOFRAN) injection 4 mg, 4 mg, Intravenous, Q4H PRN, Matthew Mendoza MD, 4  mg at 01/11/22 1823  •  pantoprazole (PROTONIX) injection 40 mg, 40 mg, Intravenous, Q12H, Benito Schwartz MD, 40 mg at 01/13/22 0818  •  prochlorperazine (COMPAZINE) injection 5 mg, 5 mg, Intravenous, Q6H PRN, Benito Schwartz MD, 5 mg at 01/11/22 1120  •  sodium chloride 0.9 % flush 10 mL, 10 mL, Intravenous, PRN, Alexandro Cannon MD, 10 mL at 01/13/22 0818     ASSESSMENT  Abdominal pain with nausea vomiting resolving  No evidence of peritoneal dialysis catheter site infection  End-stage renal disease on peritoneal dialysis  Heme-positive stools  Hypertension  Hyperlipidemia  Hypothyroidism  Chronic anemia   Gastroesophageal reflux disease    PLAN  CPM  Serial H&H  Transfuse as needed  Discontinue aspirin  Upper and lower endoscopy per GI  Nephrology to follow patient for peritoneal dialysis  Continue home medications  Stress ulcer DVT prophylaxis  Supportive care  DNR  Discussed with nursing staff  Follow closely and further recommendation current hospital course    BENITO SCHWARTZ MD

## 2022-01-13 NOTE — PLAN OF CARE
Goal Outcome Evaluation:  Plan of Care Reviewed With: patient  Progress: improving  Outcome Summary: All needs met. Regular, GI soft, bland, renal diet. PD performed per orders. VSS. Mental status at baseline. On room air. NSR on the monitor. No complaints. Will CTM.

## 2022-01-13 NOTE — PLAN OF CARE
Patient hgb 8.9. VSS. PD per orders. No complaints of pain, though will continue to monitor throughout the shift. GI recc colonoscopy and EGD. Patient is agreeable. All needs met, WCTM.           Problem: Adult Inpatient Plan of Care  Goal: Plan of Care Review  Outcome: Ongoing, Progressing  Flowsheets  Taken 1/13/2022 1556 by Kylah Reyez, RN  Progress: improving  Taken 1/13/2022 1288 by Evelyn Butterfield, RN  Plan of Care Reviewed With: patient   Goal Outcome Evaluation:           Progress: improving

## 2022-01-13 NOTE — H&P (VIEW-ONLY)
[facility]  Initial Inpatient Consult Note    Referring Provider: Dr. Mendoza    Reason for Consultation: GI bleeding.    Subjective     History of present illness:    76 y.o. female w/o CRI, on peritoneal dialysis (x 10 yrs), HTN, who was admitted 1/10/22 with abdominal pain, nausea, nonbloody vomiting and was found to be anemic with a hemoglobin on admission of 8.8, hematocrit 25.7, MCV of 95.5.  Yesterday the patient's hemoglobin was 5.4, hematocrit of 16.7.  Patient states that she feels better now.  He was found to have heme positive stool.  Her stool was real dark 1 to 2 days prior to admission.  She has had no rectal bleeding.  She has no abdominal pain or chest pain now.  No nausea or vomiting now.  No fever chills.  Her weight is decreased for the last several days.  She has no heartburn.  No dysphagia.  She is on 1 baby aspirin but denies other nonsteroidal anti-inflammatory drug use.  She is a non-smoker and denies alcohol use.  She lives alone.  She has never been .  She has no children.  She last had an EGD and colonoscopy by Dr. Chucky Kramer about 13 years ago.    Past Medical History:  Past Medical History:   Diagnosis Date   • Anemia    • Breast cancer (HCC) 2015    Left, Stage I   • Chronic renal insufficiency    • End stage renal disease (HCC)     On dialysis since 2012   • Hyperlipidemia    • Hypertension    • Peritoneal dialysis catheter in place (HCC)    • Peritoneal dialysis catheter in place (HCC)      Past Surgical History:  Past Surgical History:   Procedure Laterality Date   • APPENDECTOMY     • BREAST BIOPSY Left 2015   • COLONOSCOPY     • FOOT SURGERY      BROKEN FOOT    • INSERTION PERITONEAL DIALYSIS CATHETER N/A 12/27/2021    Procedure: REMOVAL AND PLACEMENT OF PERITONEAL DIALYSIS CATHETER LAPAROSCOPIC;  Surgeon: Pablo Severino MD;  Location: [facility];  Service: General;  Laterality: N/A;   • MASTECTOMY Left 2015   • TUBAL ABDOMINAL LIGATION         Social History:   Social History     Tobacco Use   • Smoking status: Never Smoker   • Smokeless tobacco: Never Used   Substance Use Topics   • Alcohol use: No      Family History:  Family History   Problem Relation Age of Onset   • Cervical cancer Mother    • Hypertension Father    • Kidney cancer Father    • Kidney disease Father    • Breast cancer Sister    • Hypertension Sister    • Heart disease Maternal Aunt    • Cancer Maternal Aunt    • Cancer Maternal Uncle    • Heart disease Maternal Grandmother    • Cancer Cousin        Home Meds:  Medications Prior to Admission   Medication Sig Dispense Refill Last Dose   • amLODIPine (NORVASC) 5 MG tablet Take 5 mg by mouth Daily.  3 Past Week at Unknown time   • atorvastatin (LIPITOR) 20 MG tablet Take 20 mg by mouth Every Night.   Past Week at Unknown time   • calcitriol (ROCALTROL) 0.25 MCG capsule Take 0.25 mcg by mouth Daily.   Past Week at Unknown time   • Cholecalciferol (VITAMIN D) 2000 units tablet Take 2,000 Units by mouth 3 (Three) Times a Day.   Past Week at Unknown time   • famotidine (PEPCID) 20 MG tablet Take 1 tablet by mouth 2 (Two) Times a Day for 30 days. 60 tablet 0 Past Week at Unknown time   • gentamicin (GARAMYCIN) 0.1 % cream Apply 1 application topically to the appropriate area as directed Daily. Apply around dialysis access  6 Past Week at Unknown time   • Multiple Vitamins-Iron (MULTIVITAMIN/IRON PO) Take 1 tablet by mouth Daily.   Past Week at Unknown time     Current Meds:   calcitriol, 0.25 mcg, Oral, Daily  cholecalciferol, 1,000 Units, Oral, Daily  levothyroxine, 25 mcg, Oral, Q AM  pantoprazole, 40 mg, Intravenous, Q12H      Allergies:  No Known Allergies  Review of Systems  The following systems were reviewed and negative;  constitution, ENT, respiratory, cardiovascular, musculoskeletal and neurological     Objective     Vital Signs  Temp:  [97.7 °F (36.5 °C)-98.7 °F (37.1 °C)] 98.7 °F (37.1 °C)  Heart Rate:  [] 82  Resp:   [16-18] 16  BP: ()/(41-96) 118/64  Physical Exam:  General Appearance:    Alert, cooperative, in no acute distress   Head:    Normocephalic, without obvious abnormality, atraumatic   Eyes:            Lids and lashes normal, conjunctivae and sclerae normal, no   icterus   Throat:   No oral lesions, no thrush, oral mucosa moist   Neck:   No adenopathy, supple, trachea midline, no thyromegaly, no   carotid bruit, no JVD   Lungs:     Clear to auscultation,respirations regular, even and                   unlabored    Heart:    Regular rhythm and normal rate, normal S1 and S2, no            murmur, no gallop, no rub, no click   Chest Wall:    No abnormalities observed   Abdomen:     Normal bowel sounds, no masses, no organomegaly, soft        nontender, nondistended, no guarding, no rebound                 Tenderness. Peritoneal dialysis catheter in place.    Rectal:     Deferred   Extremities:   no edema, no cyanosis, no redness   Skin:   No bleeding, bruising or rash   Lymph nodes:   No palpable adenopathy   Psychiatric:  Judgement and insight: normal   Orientation to person place and time: normal   Mood and affect: normal   Results Review:   I reviewed the patient's new clinical results.    Results from last 7 days   Lab Units 01/12/22  1206 01/12/22  0834 01/10/22  1812   WBC 10*3/mm3  --  13.20* 13.89*   HEMOGLOBIN g/dL 5.4* 5.1* 8.8*   HEMATOCRIT % 16.7* 15.2* 25.7*   PLATELETS 10*3/mm3  --  318 444     Results from last 7 days   Lab Units 01/12/22  0834 01/10/22  1812   SODIUM mmol/L 135* 137   POTASSIUM mmol/L 3.9 5.1   CHLORIDE mmol/L 93* 91*   CO2 mmol/L 25.7 24.7   BUN mg/dL 67* 78*   CREATININE mg/dL 12.27* 12.19*   CALCIUM mg/dL 7.9* 8.7   BILIRUBIN mg/dL <0.2 0.2   ALK PHOS U/L 77 107   ALT (SGPT) U/L 5 7   AST (SGOT) U/L 12 13   GLUCOSE mg/dL 121* 132*         No results found for: LIPASE    Radiology:  CT Head Without Contrast   Final Result       1. No acute intracranial findings.   2. Extensive  opacification of the left mastoid air cells. Correlation   with any evidence of mastoiditis is suggested.       Radiation dose reduction techniques were utilized, including automated   exposure control and exposure modulation based on body size.       This report was finalized on 1/10/2022 10:02 PM by Dr. Dian Condon M.D.          CT Abdomen Pelvis Without Contrast   Final Result   Patient does have some mild stranding adjacent to the entry site of the   patient's peritoneal dialysis catheter into the peritoneum. This may be   related to recent exchange, although some localized inflammation is not   excluded. Correlation with any evidence of infection is recommended.       Radiation dose reduction techniques were utilized, including automated   exposure control and exposure modulation based on body size.       This report was finalized on 1/10/2022 10:17 PM by Dr. Dian Condon M.D.          XR Chest 1 View   Final Result          Assessment/Plan   Assessment:   1.   76 y.o. female w/o CRI, on peritoneal dialysis (x 10 yrs), HTN, who was admitted 1/10/22 with abdominal pain, nausea, nonbloody vomiting and was found to be anemic with heme positive stool.  2.  Patient did have nausea and vomiting prior to admission but that has resolved.      Plan:   1.  I agree with checking serial H&H's and transfuse as necessary.  2.  I will check anemia labs.  3.  I have recommended an EGD and a colonoscopy to her.  Patient wants to think about whether she could tolerate the colonoscopy prep and whether she wants an EGD and colonoscopy?    I discussed the patient's findings and my recommendations with patient.         Ronnie Berg M.D.  [facility]  [address] [place] [address]  Office: [phone]

## 2022-01-13 NOTE — CONSULTS
[facility]  Initial Inpatient Consult Note    Referring Provider: Dr. Mendoza    Reason for Consultation: GI bleeding.    Subjective     History of present illness:    76 y.o. female w/o CRI, on peritoneal dialysis (x 10 yrs), HTN, who was admitted 1/10/22 with abdominal pain, nausea, nonbloody vomiting and was found to be anemic with a hemoglobin on admission of 8.8, hematocrit 25.7, MCV of 95.5.  Yesterday the patient's hemoglobin was 5.4, hematocrit of 16.7.  Patient states that she feels better now.  He was found to have heme positive stool.  Her stool was real dark 1 to 2 days prior to admission.  She has had no rectal bleeding.  She has no abdominal pain or chest pain now.  No nausea or vomiting now.  No fever chills.  Her weight is decreased for the last several days.  She has no heartburn.  No dysphagia.  She is on 1 baby aspirin but denies other nonsteroidal anti-inflammatory drug use.  She is a non-smoker and denies alcohol use.  She lives alone.  She has never been .  She has no children.  She last had an EGD and colonoscopy by Dr. Chucky Kramer about 13 years ago.    Past Medical History:  Past Medical History:   Diagnosis Date   • Anemia    • Breast cancer (HCC) 2015    Left, Stage I   • Chronic renal insufficiency    • End stage renal disease (HCC)     On dialysis since 2012   • Hyperlipidemia    • Hypertension    • Peritoneal dialysis catheter in place (HCC)    • Peritoneal dialysis catheter in place (HCC)      Past Surgical History:  Past Surgical History:   Procedure Laterality Date   • APPENDECTOMY     • BREAST BIOPSY Left 2015   • COLONOSCOPY     • FOOT SURGERY      BROKEN FOOT    • INSERTION PERITONEAL DIALYSIS CATHETER N/A 12/27/2021    Procedure: REMOVAL AND PLACEMENT OF PERITONEAL DIALYSIS CATHETER LAPAROSCOPIC;  Surgeon: Pablo Severino MD;  Location: [facility];  Service: General;  Laterality: N/A;   • MASTECTOMY Left 2015   • TUBAL ABDOMINAL LIGATION         Social History:   Social History     Tobacco Use   • Smoking status: Never Smoker   • Smokeless tobacco: Never Used   Substance Use Topics   • Alcohol use: No      Family History:  Family History   Problem Relation Age of Onset   • Cervical cancer Mother    • Hypertension Father    • Kidney cancer Father    • Kidney disease Father    • Breast cancer Sister    • Hypertension Sister    • Heart disease Maternal Aunt    • Cancer Maternal Aunt    • Cancer Maternal Uncle    • Heart disease Maternal Grandmother    • Cancer Cousin        Home Meds:  Medications Prior to Admission   Medication Sig Dispense Refill Last Dose   • amLODIPine (NORVASC) 5 MG tablet Take 5 mg by mouth Daily.  3 Past Week at Unknown time   • atorvastatin (LIPITOR) 20 MG tablet Take 20 mg by mouth Every Night.   Past Week at Unknown time   • calcitriol (ROCALTROL) 0.25 MCG capsule Take 0.25 mcg by mouth Daily.   Past Week at Unknown time   • Cholecalciferol (VITAMIN D) 2000 units tablet Take 2,000 Units by mouth 3 (Three) Times a Day.   Past Week at Unknown time   • famotidine (PEPCID) 20 MG tablet Take 1 tablet by mouth 2 (Two) Times a Day for 30 days. 60 tablet 0 Past Week at Unknown time   • gentamicin (GARAMYCIN) 0.1 % cream Apply 1 application topically to the appropriate area as directed Daily. Apply around dialysis access  6 Past Week at Unknown time   • Multiple Vitamins-Iron (MULTIVITAMIN/IRON PO) Take 1 tablet by mouth Daily.   Past Week at Unknown time     Current Meds:   calcitriol, 0.25 mcg, Oral, Daily  cholecalciferol, 1,000 Units, Oral, Daily  levothyroxine, 25 mcg, Oral, Q AM  pantoprazole, 40 mg, Intravenous, Q12H      Allergies:  No Known Allergies  Review of Systems  The following systems were reviewed and negative;  constitution, ENT, respiratory, cardiovascular, musculoskeletal and neurological     Objective     Vital Signs  Temp:  [97.7 °F (36.5 °C)-98.7 °F (37.1 °C)] 98.7 °F (37.1 °C)  Heart Rate:  [] 82  Resp:   [16-18] 16  BP: ()/(41-96) 118/64  Physical Exam:  General Appearance:    Alert, cooperative, in no acute distress   Head:    Normocephalic, without obvious abnormality, atraumatic   Eyes:            Lids and lashes normal, conjunctivae and sclerae normal, no   icterus   Throat:   No oral lesions, no thrush, oral mucosa moist   Neck:   No adenopathy, supple, trachea midline, no thyromegaly, no   carotid bruit, no JVD   Lungs:     Clear to auscultation,respirations regular, even and                   unlabored    Heart:    Regular rhythm and normal rate, normal S1 and S2, no            murmur, no gallop, no rub, no click   Chest Wall:    No abnormalities observed   Abdomen:     Normal bowel sounds, no masses, no organomegaly, soft        nontender, nondistended, no guarding, no rebound                 Tenderness. Peritoneal dialysis catheter in place.    Rectal:     Deferred   Extremities:   no edema, no cyanosis, no redness   Skin:   No bleeding, bruising or rash   Lymph nodes:   No palpable adenopathy   Psychiatric:  Judgement and insight: normal   Orientation to person place and time: normal   Mood and affect: normal   Results Review:   I reviewed the patient's new clinical results.    Results from last 7 days   Lab Units 01/12/22  1206 01/12/22  0834 01/10/22  1812   WBC 10*3/mm3  --  13.20* 13.89*   HEMOGLOBIN g/dL 5.4* 5.1* 8.8*   HEMATOCRIT % 16.7* 15.2* 25.7*   PLATELETS 10*3/mm3  --  318 444     Results from last 7 days   Lab Units 01/12/22  0834 01/10/22  1812   SODIUM mmol/L 135* 137   POTASSIUM mmol/L 3.9 5.1   CHLORIDE mmol/L 93* 91*   CO2 mmol/L 25.7 24.7   BUN mg/dL 67* 78*   CREATININE mg/dL 12.27* 12.19*   CALCIUM mg/dL 7.9* 8.7   BILIRUBIN mg/dL <0.2 0.2   ALK PHOS U/L 77 107   ALT (SGPT) U/L 5 7   AST (SGOT) U/L 12 13   GLUCOSE mg/dL 121* 132*         No results found for: LIPASE    Radiology:  CT Head Without Contrast   Final Result       1. No acute intracranial findings.   2. Extensive  opacification of the left mastoid air cells. Correlation   with any evidence of mastoiditis is suggested.       Radiation dose reduction techniques were utilized, including automated   exposure control and exposure modulation based on body size.       This report was finalized on 1/10/2022 10:02 PM by Dr. Dian Condon M.D.          CT Abdomen Pelvis Without Contrast   Final Result   Patient does have some mild stranding adjacent to the entry site of the   patient's peritoneal dialysis catheter into the peritoneum. This may be   related to recent exchange, although some localized inflammation is not   excluded. Correlation with any evidence of infection is recommended.       Radiation dose reduction techniques were utilized, including automated   exposure control and exposure modulation based on body size.       This report was finalized on 1/10/2022 10:17 PM by Dr. Dian Condon M.D.          XR Chest 1 View   Final Result          Assessment/Plan   Assessment:   1.   76 y.o. female w/o CRI, on peritoneal dialysis (x 10 yrs), HTN, who was admitted 1/10/22 with abdominal pain, nausea, nonbloody vomiting and was found to be anemic with heme positive stool.  2.  Patient did have nausea and vomiting prior to admission but that has resolved.      Plan:   1.  I agree with checking serial H&H's and transfuse as necessary.  2.  I will check anemia labs.  3.  I have recommended an EGD and a colonoscopy to her.  Patient wants to think about whether she could tolerate the colonoscopy prep and whether she wants an EGD and colonoscopy?    I discussed the patient's findings and my recommendations with patient.         Ronnie Berg M.D.  [facility]  [address] [place] [address]  Office: [phone]

## 2022-01-14 ENCOUNTER — HOSPITAL ENCOUNTER (OUTPATIENT)
Facility: HOSPITAL | Age: 77
Setting detail: HOSPITAL OUTPATIENT SURGERY
End: 2022-01-14
Attending: INTERNAL MEDICINE | Admitting: INTERNAL MEDICINE

## 2022-01-14 PROBLEM — R19.5 HEME POSITIVE STOOL: Status: ACTIVE | Noted: 2022-01-14

## 2022-01-14 LAB
ALBUMIN SERPL-MCNC: 2.4 G/DL (ref 3.5–5.2)
ANION GAP SERPL CALCULATED.3IONS-SCNC: 16 MMOL/L (ref 5–15)
BACTERIA FLD CULT: NORMAL
BASOPHILS # BLD AUTO: 0.04 10*3/MM3 (ref 0–0.2)
BASOPHILS NFR BLD AUTO: 0.5 % (ref 0–1.5)
BUN SERPL-MCNC: 51 MG/DL (ref 8–23)
BUN/CREAT SERPL: 5.4 (ref 7–25)
CALCIUM SPEC-SCNC: 8.4 MG/DL (ref 8.6–10.5)
CHLORIDE SERPL-SCNC: 93 MMOL/L (ref 98–107)
CO2 SERPL-SCNC: 25 MMOL/L (ref 22–29)
CREAT SERPL-MCNC: 9.46 MG/DL (ref 0.57–1)
DEPRECATED RDW RBC AUTO: 60.1 FL (ref 37–54)
EOSINOPHIL # BLD AUTO: 0.42 10*3/MM3 (ref 0–0.4)
EOSINOPHIL NFR BLD AUTO: 5.7 % (ref 0.3–6.2)
ERYTHROCYTE [DISTWIDTH] IN BLOOD BY AUTOMATED COUNT: 17.8 % (ref 12.3–15.4)
FERRITIN SERPL-MCNC: 1535 NG/ML (ref 13–150)
GFR SERPL CREATININE-BSD FRML MDRD: 4 ML/MIN/1.73
GFR SERPL CREATININE-BSD FRML MDRD: ABNORMAL ML/MIN/{1.73_M2}
GLUCOSE SERPL-MCNC: 121 MG/DL (ref 65–99)
GRAM STN SPEC: NORMAL
HCT VFR BLD AUTO: 27.7 % (ref 34–46.6)
HGB BLD-MCNC: 9.1 G/DL (ref 12–15.9)
IMM GRANULOCYTES # BLD AUTO: 0.08 10*3/MM3 (ref 0–0.05)
IMM GRANULOCYTES NFR BLD AUTO: 1.1 % (ref 0–0.5)
IRON 24H UR-MRATE: 62 MCG/DL (ref 37–145)
IRON SATN MFR SERPL: 30 % (ref 20–50)
LYMPHOCYTES # BLD AUTO: 1.37 10*3/MM3 (ref 0.7–3.1)
LYMPHOCYTES NFR BLD AUTO: 18.7 % (ref 19.6–45.3)
MCH RBC QN AUTO: 30.1 PG (ref 26.6–33)
MCHC RBC AUTO-ENTMCNC: 32.9 G/DL (ref 31.5–35.7)
MCV RBC AUTO: 91.7 FL (ref 79–97)
MONOCYTES # BLD AUTO: 0.62 10*3/MM3 (ref 0.1–0.9)
MONOCYTES NFR BLD AUTO: 8.5 % (ref 5–12)
NEUTROPHILS NFR BLD AUTO: 4.79 10*3/MM3 (ref 1.7–7)
NEUTROPHILS NFR BLD AUTO: 65.5 % (ref 42.7–76)
NRBC BLD AUTO-RTO: 0.1 /100 WBC (ref 0–0.2)
PHOSPHATE SERPL-MCNC: 7.2 MG/DL (ref 2.5–4.5)
PLATELET # BLD AUTO: 357 10*3/MM3 (ref 140–450)
PMV BLD AUTO: 10 FL (ref 6–12)
POTASSIUM SERPL-SCNC: 3.9 MMOL/L (ref 3.5–5.2)
RBC # BLD AUTO: 3.02 10*6/MM3 (ref 3.77–5.28)
SARS-COV-2 RNA PNL SPEC NAA+PROBE: NOT DETECTED
SODIUM SERPL-SCNC: 134 MMOL/L (ref 136–145)
TIBC SERPL-MCNC: 210 MCG/DL (ref 298–536)
TRANSFERRIN SERPL-MCNC: 141 MG/DL (ref 200–360)
VIT B12 BLD-MCNC: 914 PG/ML (ref 211–946)
WBC NRBC COR # BLD: 7.32 10*3/MM3 (ref 3.4–10.8)

## 2022-01-14 PROCEDURE — 99232 SBSQ HOSP IP/OBS MODERATE 35: CPT | Performed by: INTERNAL MEDICINE

## 2022-01-14 PROCEDURE — 87635 SARS-COV-2 COVID-19 AMP PRB: CPT | Performed by: INTERNAL MEDICINE

## 2022-01-14 PROCEDURE — 82728 ASSAY OF FERRITIN: CPT | Performed by: INTERNAL MEDICINE

## 2022-01-14 PROCEDURE — 85014 HEMATOCRIT: CPT | Performed by: INTERNAL MEDICINE

## 2022-01-14 PROCEDURE — 82607 VITAMIN B-12: CPT | Performed by: INTERNAL MEDICINE

## 2022-01-14 PROCEDURE — 82747 ASSAY OF FOLIC ACID RBC: CPT | Performed by: INTERNAL MEDICINE

## 2022-01-14 PROCEDURE — 80069 RENAL FUNCTION PANEL: CPT | Performed by: INTERNAL MEDICINE

## 2022-01-14 PROCEDURE — 85025 COMPLETE CBC W/AUTO DIFF WBC: CPT | Performed by: INTERNAL MEDICINE

## 2022-01-14 PROCEDURE — 83540 ASSAY OF IRON: CPT | Performed by: INTERNAL MEDICINE

## 2022-01-14 PROCEDURE — 84466 ASSAY OF TRANSFERRIN: CPT | Performed by: INTERNAL MEDICINE

## 2022-01-14 RX ORDER — SEVELAMER CARBONATE 800 MG/1
2400 TABLET, FILM COATED ORAL
Status: DISCONTINUED | OUTPATIENT
Start: 2022-01-14 | End: 2022-01-16 | Stop reason: HOSPADM

## 2022-01-14 RX ADMIN — PANTOPRAZOLE SODIUM 40 MG: 40 INJECTION, POWDER, FOR SOLUTION INTRAVENOUS at 08:41

## 2022-01-14 RX ADMIN — SEVELAMER CARBONATE 2400 MG: 800 TABLET, FILM COATED ORAL at 17:47

## 2022-01-14 RX ADMIN — SEVELAMER CARBONATE 2400 MG: 800 TABLET, FILM COATED ORAL at 12:52

## 2022-01-14 RX ADMIN — CALCITRIOL 0.25 MCG: 0.25 CAPSULE ORAL at 08:41

## 2022-01-14 RX ADMIN — SEVELAMER CARBONATE 2400 MG: 800 TABLET, FILM COATED ORAL at 10:08

## 2022-01-14 RX ADMIN — Medication 1000 UNITS: at 08:41

## 2022-01-14 RX ADMIN — LEVOTHYROXINE SODIUM 25 MCG: 0.03 TABLET ORAL at 06:12

## 2022-01-14 RX ADMIN — HYDROCODONE BITARTRATE AND ACETAMINOPHEN 1 TABLET: 5; 325 TABLET ORAL at 01:12

## 2022-01-14 RX ADMIN — PANTOPRAZOLE SODIUM 40 MG: 40 INJECTION, POWDER, FOR SOLUTION INTRAVENOUS at 21:04

## 2022-01-14 RX ADMIN — DEXTROSE MONOHYDRATE, SODIUM CHLORIDE, SODIUM LACTATE, CALCIUM CHLORIDE, MAGNESIUM CHLORIDE 2000 ML: 1.5; 538; 448; 18.4; 5.08 SOLUTION INTRAPERITONEAL at 06:12

## 2022-01-14 RX ADMIN — POLYETHYLENE GLYCOL 3350, SODIUM SULFATE ANHYDROUS, SODIUM BICARBONATE, SODIUM CHLORIDE, POTASSIUM CHLORIDE 2000 ML: 236; 22.74; 6.74; 5.86; 2.97 POWDER, FOR SOLUTION ORAL at 21:04

## 2022-01-14 NOTE — PROGRESS NOTES
"Daily progress note    Complaint complaint  Doing better  No new complaints  Denies any abdominal pain nausea vomiting diarrhea    History of present illness  76 white female with history of end-stage renal disease on peritoneal dialysis hypertension hyperlipidemia and chronic anemia presented to [facility] emergency room with abdominal pain with dry heaving nausea and vomited couple of times.  Patient denies any diarrhea but had 1 episode.  Patient denies any fever or chills.  Patient denies any black stools or blood in the stools.  Patient work-up in ER revealed persistent nausea vomiting secondary to uremia and possible infection secondary to peritonitis catheter admitted for management     REVIEW OF SYSTEMS  Unremarkable except generalized weakness    PHYSICAL EXAM   Blood pressure 123/74, pulse 77, temperature 97.8 °F (36.6 °C), temperature source Oral, resp. rate 17, height 160 cm (63\"), weight 52 kg (114 lb 10.2 oz), SpO2 96 %.    Constitutional:  Awake and alert in mild distress secondary to nausea and pain  HENT: Normocephalic and atraumatic. Oropharynx moist/nonerythematous.  Neck: Normal range of motion. Neck supple. No JVD present.   Cardiovascular: Normal rate, regular rhythm and normal heart sounds.  Pulmonary/Chest: Effort normal and breath sounds normal. No stridor. No wheezes, no rales.   Abdominal:  Soft mild diffuse tenderness bowel sounds positive  Musculoskeletal: Normal range of motion. No edema, tenderness or deformity.   Neurological: Pt. is alert and oriented to person, place, and time. No focal neurologic deficits  Skin: Skin is warm and dry. No rash noted. Pt. is not diaphoretic. No erythema.   Psychiatric: Mood, affect and judgment normal.  She is pleasant and cooperative.    LAB RESULTS  Lab Results (last 24 hours)     Procedure Component Value Units Date/Time    Vitamin B12 [[id number]]  (Normal) Collected: 01/14/22 0712    Specimen: Blood Updated: 01/14/22 0829     Vitamin " B-12 914 pg/mL     Narrative:      Results may be falsely increased if patient taking Biotin.      Ferritin [[id number]]  (Abnormal) Collected: 01/14/22 0712    Specimen: Blood Updated: 01/14/22 0820     Ferritin 1,535.00 ng/mL     Narrative:      Results may be falsely decreased if patient taking Biotin.      Renal Function Panel [[id number]]  (Abnormal) Collected: 01/14/22 0712    Specimen: Blood Updated: 01/14/22 0816     Glucose 121 mg/dL      BUN 51 mg/dL      Creatinine 9.46 mg/dL      Sodium 134 mmol/L      Potassium 3.9 mmol/L      Comment: Slight hemolysis detected by analyzer. Results may be affected.        Chloride 93 mmol/L      CO2 25.0 mmol/L      Calcium 8.4 mg/dL      Albumin 2.40 g/dL      Phosphorus 7.2 mg/dL      Anion Gap 16.0 mmol/L      BUN/Creatinine Ratio 5.4     eGFR Non African Amer 4 mL/min/1.73      Comment: <15 Indicative of kidney failure.        eGFR   Amer --     Comment: <15 Indicative of kidney failure.       Narrative:      GFR Normal >60  Chronic Kidney Disease <60  Kidney Failure <15      Iron Profile [[id number]]  (Abnormal) Collected: 01/14/22 0712    Specimen: Blood Updated: 01/14/22 0816     Iron 62 mcg/dL      Iron Saturation 30 %      Transferrin 141 mg/dL      TIBC 210 mcg/dL     CBC & Differential [[id number]]  (Abnormal) Collected: 01/14/22 0712    Specimen: Blood Updated: 01/14/22 0800    Narrative:      The following orders were created for panel order CBC & Differential.  Procedure                               Abnormality         Status                     ---------                               -----------         ------                     CBC Auto Differential[[id number]]        Abnormal            Final result                 Please view results for these tests on the individual orders.    CBC Auto Differential [[id number]]  (Abnormal) Collected: 01/14/22 0712    Specimen: Blood Updated: 01/14/22 0800     WBC 7.32 10*3/mm3      RBC 3.02 10*6/mm3      Hemoglobin  9.1 g/dL      Hematocrit 27.7 %      MCV 91.7 fL      MCH 30.1 pg      MCHC 32.9 g/dL      RDW 17.8 %      RDW-SD 60.1 fl      MPV 10.0 fL      Platelets 357 10*3/mm3      Neutrophil % 65.5 %      Lymphocyte % 18.7 %      Monocyte % 8.5 %      Eosinophil % 5.7 %      Basophil % 0.5 %      Immature Grans % 1.1 %      Neutrophils, Absolute 4.79 10*3/mm3      Lymphocytes, Absolute 1.37 10*3/mm3      Monocytes, Absolute 0.62 10*3/mm3      Eosinophils, Absolute 0.42 10*3/mm3      Basophils, Absolute 0.04 10*3/mm3      Immature Grans, Absolute 0.08 10*3/mm3      nRBC 0.1 /100 WBC     Body Fluid Culture - Body Fluid, Peritoneum [[id number]] Collected: 01/11/22 0009    Specimen: Body Fluid from Peritoneum Updated: 01/14/22 0745     Body Fluid Culture No growth at 3 days     Gram Stain No WBCs or organisms seen    Folate RBC [[id number]] Collected: 01/14/22 0712    Specimen: Blood Updated: 01/14/22 0740        Imaging Results (Last 24 Hours)     ** No results found for the last 24 hours. **             ECG 12 Lead  Component   Ref Range & Units 1/10/22 2340 12/28/21 0955   QT Interval   ms 339  395              HEART RATE= 98  bpm  RR Interval= 612  ms  PA Interval= 153  ms  P Horizontal Axis= -2  deg  P Front Axis= 71  deg  QRSD Interval= 63  ms  QT Interval= 339  ms  QRS Axis= 13  deg  T Wave Axis= 175  deg  - ABNORMAL ECG -  Sinus rhythm  Nonspecific T abnormalities, lateral leads  When compared with ECG of 28-Dec-2021 9:55:52,  Significant rate increase otherwise no change              Current Facility-Administered Medications:   •  calcitriol (ROCALTROL) capsule 0.25 mcg, 0.25 mcg, Oral, Daily, Matthew Mendoza MD, 0.25 mcg at 01/14/22 0841  •  cholecalciferol (VITAMIN D3) tablet 1,000 Units, 1,000 Units, Oral, Daily, Matthew Mendoza MD, 1,000 Units at 01/14/22 0841  •  Delflex-LC/1.5% Dextrose (DIANEAL) CAPD 2,000 mL, 2,000 mL, Intraperitoneal, PRN, Carlos Schwab MD, 2,000 mL at 01/14/22 0612  •  Delflex-LC/2.5%  Dextrose (DIANEAL) CAPD 2,000 mL, 2,000 mL, Intraperitoneal, PRN, Carlos Schwab MD, 2,000 mL at 01/13/22 2204  •  HYDROcodone-acetaminophen (NORCO) 5-325 MG per tablet 1 tablet, 1 tablet, Oral, Q6H PRN, Benito Schwartz MD, 1 tablet at 01/14/22 0112  •  levothyroxine (SYNTHROID, LEVOTHROID) tablet 25 mcg, 25 mcg, Oral, Q AM, Benito Schwartz MD, 25 mcg at 01/14/22 0612  •  ondansetron (ZOFRAN) injection 4 mg, 4 mg, Intravenous, Q4H PRN, Benito Schwartz MD, 4 mg at 01/11/22 1823  •  pantoprazole (PROTONIX) injection 40 mg, 40 mg, Intravenous, Q12H, Benito Schwartz MD, 40 mg at 01/14/22 0841  •  prochlorperazine (COMPAZINE) injection 5 mg, 5 mg, Intravenous, Q6H PRN, Benito Schwartz MD, 5 mg at 01/11/22 1120  •  sevelamer (RENVELA) tablet 2,400 mg, 2,400 mg, Oral, TID With Meals, Carlos Schwab MD, 2,400 mg at 01/14/22 1008  •  sodium chloride 0.9 % flush 10 mL, 10 mL, Intravenous, PRN, Alexandro Cannon MD, 10 mL at 01/13/22 0818     ASSESSMENT  Abdominal pain with nausea vomiting resolving  No evidence of peritoneal dialysis catheter site infection  End-stage renal disease on peritoneal dialysis  Heme-positive stools  Hypertension  Hyperlipidemia  Hypothyroidism  Chronic anemia   Gastroesophageal reflux disease    PLAN  CPM  Serial H&H  Transfuse as needed  Discontinue aspirin  Upper and lower endoscopy in a.m.  Nephrology to follow patient for peritoneal dialysis  Continue home medications  Stress ulcer DVT prophylaxis  Supportive care  Discussed with nursing staff and gastroenterology  Follow closely and further recommendation current hospital course    BENITO SCHWARTZ MD

## 2022-01-14 NOTE — PROGRESS NOTES
[facility], [facility]  Inpatient Progress Note    Reason for Follow Up: GI bleeding.     Subjective     Interval History:   No melena or rectal bleeding.  She has decided that she wants to have the EGD and colonoscopy done tomorrow.  Her hemoglobin is 9.1 and stable today.    Current Facility-Administered Medications:   •  calcitriol (ROCALTROL) capsule 0.25 mcg, 0.25 mcg, Oral, Daily, Matthew Mendoza MD, 0.25 mcg at 01/14/22 0841  •  cholecalciferol (VITAMIN D3) tablet 1,000 Units, 1,000 Units, Oral, Daily, Matthew Mendoza MD, 1,000 Units at 01/14/22 0841  •  Delflex-LC/1.5% Dextrose (DIANEAL) CAPD 2,000 mL, 2,000 mL, Intraperitoneal, PRN, Carlos Schwab MD, 2,000 mL at 01/14/22 0612  •  Delflex-LC/2.5% Dextrose (DIANEAL) CAPD 2,000 mL, 2,000 mL, Intraperitoneal, PRN, Carlos Schwab MD, 2,000 mL at 01/13/22 2204  •  HYDROcodone-acetaminophen (NORCO) 5-325 MG per tablet 1 tablet, 1 tablet, Oral, Q6H PRN, Matthew Mendoza MD, 1 tablet at 01/14/22 0112  •  levothyroxine (SYNTHROID, LEVOTHROID) tablet 25 mcg, 25 mcg, Oral, Q AM, Matthew Mendoza MD, 25 mcg at 01/14/22 0612  •  ondansetron (ZOFRAN) injection 4 mg, 4 mg, Intravenous, Q4H PRN, Matthew Mendoza MD, 4 mg at 01/11/22 1823  •  pantoprazole (PROTONIX) injection 40 mg, 40 mg, Intravenous, Q12H, Matthew Mendoza MD, 40 mg at 01/14/22 0841  •  sevelamer (RENVELA) tablet 2,400 mg, 2,400 mg, Oral, TID With Meals, Carlos Schwab MD, 2,400 mg at 01/14/22 1252  •  sodium chloride 0.9 % flush 10 mL, 10 mL, Intravenous, PRN, Cannon, Alexandro, MD, 10 mL at 01/13/22 0818  Review of Systems:    The following systems were reviewed and negative;  constitution, ENT, respiratory, cardiovascular and musculoskeletal    Objective     Vital Signs  Temp:  [97.8 °F (36.6 °C)-98.2 °F (36.8 °C)] 97.8 °F (36.6 °C)  Heart Rate:  [77-98] 77  Resp:  [17] 17  BP: (123-134)/(68-74) 123/74  Body mass index is 20.31 kg/m².    Intake/Output Summary (Last 24 hours) at  1/14/2022 1327  Last data filed at 1/14/2022 0900  Gross per 24 hour   Intake 8340 ml   Output 8500 ml   Net -160 ml     I/O this shift:  In: 240 [P.O.:240]  Out: -      Physical Exam:   General: patient awake, alert and cooperative   Eyes: Normal lids and lashes, no scleral icterus   Neck: supple, normal ROM   Skin: warm and dry, not jaundiced   Cardiovascular: regular rhythm and rate, no murmurs auscultated   Pulm: clear to auscultation bilaterally, regular and unlabored   Abdomen: soft, nontender, nondistended; normal bowel sounds   Extremities: no rash or edema   Psychiatric: Normal mood and behavior; memory intact     Results Review:     I reviewed the patient's new clinical results.    Results from last 7 days   Lab Units 01/14/22  0712 01/13/22  0941 01/12/22  1206 01/12/22  0834 01/12/22  0834   WBC 10*3/mm3 7.32 11.88*  --   --  13.20*   HEMOGLOBIN g/dL 9.1* 8.9* 5.4*   < > 5.1*   HEMATOCRIT % 27.7* 27.1* 16.7*   < > 15.2*   PLATELETS 10*3/mm3 357 317  --   --  318    < > = values in this interval not displayed.     Results from last 7 days   Lab Units 01/14/22  0712 01/13/22  0941 01/12/22  0834 01/10/22  1812 01/10/22  1812   SODIUM mmol/L 134* 136 135*   < > 137   POTASSIUM mmol/L 3.9 4.1 3.9   < > 5.1   CHLORIDE mmol/L 93* 95* 93*   < > 91*   CO2 mmol/L 25.0 27.3 25.7   < > 24.7   BUN mg/dL 51* 54* 67*   < > 78*   CREATININE mg/dL 9.46* 10.34* 12.27*   < > 12.19*   CALCIUM mg/dL 8.4* 8.4* 7.9*   < > 8.7   BILIRUBIN mg/dL  --   --  <0.2  --  0.2   ALK PHOS U/L  --   --  77  --  107   ALT (SGPT) U/L  --   --  5  --  7   AST (SGOT) U/L  --   --  12  --  13   GLUCOSE mg/dL 121* 110* 121*   < > 132*    < > = values in this interval not displayed.         No results found for: LIPASE    Radiology:  CT Head Without Contrast   Final Result       1. No acute intracranial findings.   2. Extensive opacification of the left mastoid air cells. Correlation   with any evidence of mastoiditis is suggested.        Radiation dose reduction techniques were utilized, including automated   exposure control and exposure modulation based on body size.       This report was finalized on 1/10/2022 10:02 PM by Dr. Dian Condon M.D.          CT Abdomen Pelvis Without Contrast   Final Result   Patient does have some mild stranding adjacent to the entry site of the   patient's peritoneal dialysis catheter into the peritoneum. This may be   related to recent exchange, although some localized inflammation is not   excluded. Correlation with any evidence of infection is recommended.       Radiation dose reduction techniques were utilized, including automated   exposure control and exposure modulation based on body size.       This report was finalized on 1/10/2022 10:17 PM by Dr. Dian Condon M.D.          XR Chest 1 View   Final Result          Assessment/Plan     Patient Active Problem List   Diagnosis   • Anemia   • History of breast cancer   • ESRD (end stage renal disease) (HCC)   • Generalized abdominal pain       Assessment/Recommendations:    Assessment:   1.   76 y.o. female w/o CRI, on peritoneal dialysis (x 10 yrs), HTN, who was admitted 1/10/22 with abdominal pain, nausea, nonbloody vomiting and was found to be anemic with heme positive stool.  2.  Patient did have nausea and vomiting prior to admission but that has resolved.     Plan:   1.  I agree with checking serial H&H's and transfuse as necessary.  2. I will schedule her to have an EGD and colonoscopy tomorrow by Dr. SWETHA Rosen. I would recommend that her peritoneal dialysate be out of her during the colonoscopy. Dr. BOURNE will be able to see the colon better if the dialysate is out during the colonoscopy.     I discussed the patients findings and my recommendations with patient, family and nursing staff.    Ronnie Berg MD

## 2022-01-14 NOTE — CASE MANAGEMENT/SOCIAL WORK
Continued Stay Note  [facility]     Patient Name: Fany Todd  MRN: [id number]  Today's Date: 1/14/2022    Admit Date: 1/10/2022     Discharge Plan     Row Name 01/14/22 0900       Plan    Plan plans home with sisters to assist as needed    Patient/Family in Agreement with Plan yes    Plan Comments Spoke with patient at bedside.  She confirms that she plans to return home at dc and that her sisters will assist her.  One of her sisters will transport her home at dc.  CCP will follow. Fabi Durand RN               Discharge Codes    No documentation.               Expected Discharge Date and Time     Expected Discharge Date Expected Discharge Time    Jan 16, 2022             Fabi Durand RN     no

## 2022-01-14 NOTE — PROGRESS NOTES
[facility] Progress Note      Patient Name: Fany Todd  : 1945  MRN: [id number]  Primary Care Physician:  Lilibeth Cota MD  Date of admission: 1/10/2022    Subjective     Interval History:   Follow-up end-stage renal disease on peritoneal dialysis    The patient is feeling much better today, denies any chest pain or shortness of air, no orthopnea or PND, no nausea or vomiting, no abdominal pain, no dysuria or gross hematuria, denies melena or hematochezia.  Now she is agreeable to have her colonoscopy    Review of Systems:   As noted above    Objective     Vitals:   Temp:  [97.8 °F (36.6 °C)-98.2 °F (36.8 °C)] 97.8 °F (36.6 °C)  Heart Rate:  [77-98] 77  Resp:  [16-17] 17  BP: (123-136)/(68-81) 123/74    Intake/Output Summary (Last 24 hours) at 2022 0842  Last data filed at 2022 0700  Gross per 24 hour   Intake 88868 ml   Output 39423 ml   Net -760 ml       Physical Exam:    Constitutional: Awake, alert, no acute distress.  Appears to be chronically ill and frail  HEENT: Sclera anicteric, no conjunctival injection, oral mucosa slightly dry  Neck: Supple, no thyromegaly, no lymphadenopathy, trachea at midline, no JVD  Respiratory: Clear to auscultation bilaterally, nonlabored respiration  Cardiovascular: RRR, no murmurs, no rubs or gallops, no carotid bruit  Gastrointestinal: Positive bowel sounds, abdomen is soft, nontender and nondistended, PD catheter in place exit site is clean there is no tunnel tenderness  : No palpable bladder  Musculoskeletal: No edema, no clubbing or cyanosis  Neurologic: Oriented x3, moving all extremities, normal speech and mental status  Skin: Warm and dry     Scheduled Meds:     calcitriol, 0.25 mcg, Oral, Daily  cholecalciferol, 1,000 Units, Oral, Daily  levothyroxine, 25 mcg, Oral, Q AM  pantoprazole, 40 mg, Intravenous, Q12H      IV Meds:        Results Reviewed:   I have personally reviewed the results from the time of this  admission to 1/14/2022 08:42 EST     Results from last 7 days   Lab Units 01/14/22  0712 01/13/22  0941 01/12/22  0834 01/10/22  1812 01/10/22  1812   SODIUM mmol/L 134* 136 135*   < > 137   POTASSIUM mmol/L 3.9 4.1 3.9   < > 5.1   CHLORIDE mmol/L 93* 95* 93*   < > 91*   CO2 mmol/L 25.0 27.3 25.7   < > 24.7   BUN mg/dL 51* 54* 67*   < > 78*   CREATININE mg/dL 9.46* 10.34* 12.27*   < > 12.19*   CALCIUM mg/dL 8.4* 8.4* 7.9*   < > 8.7   BILIRUBIN mg/dL  --   --  <0.2  --  0.2   ALK PHOS U/L  --   --  77  --  107   ALT (SGPT) U/L  --   --  5  --  7   AST (SGOT) U/L  --   --  12  --  13   GLUCOSE mg/dL 121* 110* 121*   < > 132*    < > = values in this interval not displayed.       Estimated Creatinine Clearance: 4.2 mL/min (A) (by C-G formula based on SCr of 9.46 mg/dL (H)).    Results from last 7 days   Lab Units 01/14/22  0712 01/10/22  1812   MAGNESIUM mg/dL  --  1.8   PHOSPHORUS mg/dL 7.2*  --              Results from last 7 days   Lab Units 01/14/22  0712 01/13/22  0941 01/12/22  1206 01/12/22  0834 01/10/22  1812   WBC 10*3/mm3 7.32 11.88*  --  13.20* 13.89*   HEMOGLOBIN g/dL 9.1* 8.9* 5.4* 5.1* 8.8*   PLATELETS 10*3/mm3 357 317  --  318 444             Assessment / Plan     ASSESSMENT:  1. ESRD, patient has poor appetite with what appears to be uremic symptoms with inadequate peritoneal dialysis because we are using low volume because of the new catheter placement less than 2 weeks ago.  No evidence of peritonitis, feeling much better tolerating her dialysis without any difficulties, sodium 134 and potassium 3.9, phosphorus 7.2 and albumin 2.4  2.  Hypertension controlled  3.  Anemia of chronic kidney disease, hemoglobin yesterday was down to 5.1, she received 2 units of packed RBCs, the patient on long-acting PAOLO, hemoglobin today 9.1 she has heme positive stool plan for colonoscopy  4. history of breast cancer of the left breast with mastectomy treated surgically with no evidence of recurrent  5.   Hyperphosphatemia, phosphorus 7.2  6. protein calorie malnutrition, albumin is 2.4    PLAN:  1. continue the same treatment  2.  Add sevelamer 2400 mg 3 times daily with meals as a phosphate binder  3.  Protein supplement nova source renal 1 can twice daily  4.  Surveillance labs    Thank you for involving us in the care of Fany Todd.  Please feel free to call with any questions.    Carlos Schwab MD  01/14/22  08:42 [facility]    [facility]  [phone]      Much of this encounter note is an electronic transcription/translation of spoken language to printed text. The electronic translation of spoken language may permit erroneous, or at times, nonsensical words or phrases to be inadvertently transcribed; Although I have reviewed the note for such errors, some may still exist.

## 2022-01-14 NOTE — PLAN OF CARE
Goal Outcome Evaluation:  Plan of Care Reviewed With: patient  Progress: improving  Outcome Summary: All needs met. VSS. A&O x 4. Remains on RA. NSR. Norco x 1 PRN. PD performed per orders. Will CTM.

## 2022-01-14 NOTE — PLAN OF CARE
Goal Outcome Evaluation:           Progress: no change  Outcome Summary: A&Ox4. Pt on RA. Will complete 3 PD exchanges by end of shift. Pt tolerating well, more output then input. Clear liquid diet. NPO at midnight. EGD and colonoscopy scheduled for tomorrow. Informed consent in chart. COVID swab needs to be collected. VSS. Will continue to monitor.

## 2022-01-15 ENCOUNTER — ANESTHESIA (OUTPATIENT)
Dept: GASTROENTEROLOGY | Facility: HOSPITAL | Age: 77
End: 2022-01-15

## 2022-01-15 ENCOUNTER — ANESTHESIA EVENT (OUTPATIENT)
Dept: GASTROENTEROLOGY | Facility: HOSPITAL | Age: 77
End: 2022-01-15

## 2022-01-15 LAB
ALBUMIN SERPL-MCNC: 2.7 G/DL (ref 3.5–5.2)
ANION GAP SERPL CALCULATED.3IONS-SCNC: 17.1 MMOL/L (ref 5–15)
BASOPHILS # BLD AUTO: 0.03 10*3/MM3 (ref 0–0.2)
BASOPHILS NFR BLD AUTO: 0.4 % (ref 0–1.5)
BUN SERPL-MCNC: 40 MG/DL (ref 8–23)
BUN/CREAT SERPL: 4.8 (ref 7–25)
CALCIUM SPEC-SCNC: 8.9 MG/DL (ref 8.6–10.5)
CHLORIDE SERPL-SCNC: 90 MMOL/L (ref 98–107)
CO2 SERPL-SCNC: 27.9 MMOL/L (ref 22–29)
CREAT SERPL-MCNC: 8.29 MG/DL (ref 0.57–1)
DEPRECATED RDW RBC AUTO: 54.9 FL (ref 37–54)
EOSINOPHIL # BLD AUTO: 0.28 10*3/MM3 (ref 0–0.4)
EOSINOPHIL NFR BLD AUTO: 3.6 % (ref 0.3–6.2)
ERYTHROCYTE [DISTWIDTH] IN BLOOD BY AUTOMATED COUNT: 17.2 % (ref 12.3–15.4)
GFR SERPL CREATININE-BSD FRML MDRD: 5 ML/MIN/1.73
GFR SERPL CREATININE-BSD FRML MDRD: ABNORMAL ML/MIN/{1.73_M2}
GLUCOSE SERPL-MCNC: 108 MG/DL (ref 65–99)
HCT VFR BLD AUTO: 27.9 % (ref 34–46.6)
HGB BLD-MCNC: 9.6 G/DL (ref 12–15.9)
IMM GRANULOCYTES # BLD AUTO: 0.07 10*3/MM3 (ref 0–0.05)
IMM GRANULOCYTES NFR BLD AUTO: 0.9 % (ref 0–0.5)
LYMPHOCYTES # BLD AUTO: 1.02 10*3/MM3 (ref 0.7–3.1)
LYMPHOCYTES NFR BLD AUTO: 13.1 % (ref 19.6–45.3)
MCH RBC QN AUTO: 30.2 PG (ref 26.6–33)
MCHC RBC AUTO-ENTMCNC: 34.4 G/DL (ref 31.5–35.7)
MCV RBC AUTO: 87.7 FL (ref 79–97)
MONOCYTES # BLD AUTO: 0.67 10*3/MM3 (ref 0.1–0.9)
MONOCYTES NFR BLD AUTO: 8.6 % (ref 5–12)
NEUTROPHILS NFR BLD AUTO: 5.69 10*3/MM3 (ref 1.7–7)
NEUTROPHILS NFR BLD AUTO: 73.4 % (ref 42.7–76)
NRBC BLD AUTO-RTO: 0 /100 WBC (ref 0–0.2)
PHOSPHATE SERPL-MCNC: 6.1 MG/DL (ref 2.5–4.5)
PLATELET # BLD AUTO: 398 10*3/MM3 (ref 140–450)
PMV BLD AUTO: 9.6 FL (ref 6–12)
POTASSIUM SERPL-SCNC: 3.5 MMOL/L (ref 3.5–5.2)
RBC # BLD AUTO: 3.18 10*6/MM3 (ref 3.77–5.28)
SODIUM SERPL-SCNC: 135 MMOL/L (ref 136–145)
WBC NRBC COR # BLD: 7.76 10*3/MM3 (ref 3.4–10.8)

## 2022-01-15 PROCEDURE — 85025 COMPLETE CBC W/AUTO DIFF WBC: CPT | Performed by: INTERNAL MEDICINE

## 2022-01-15 PROCEDURE — 45378 DIAGNOSTIC COLONOSCOPY: CPT | Performed by: INTERNAL MEDICINE

## 2022-01-15 PROCEDURE — 43235 EGD DIAGNOSTIC BRUSH WASH: CPT | Performed by: INTERNAL MEDICINE

## 2022-01-15 PROCEDURE — 25010000002 PROPOFOL 10 MG/ML EMULSION: Performed by: ANESTHESIOLOGY

## 2022-01-15 PROCEDURE — 80069 RENAL FUNCTION PANEL: CPT | Performed by: INTERNAL MEDICINE

## 2022-01-15 PROCEDURE — 25010000002 ONDANSETRON PER 1 MG: Performed by: HOSPITALIST

## 2022-01-15 PROCEDURE — 0DJ08ZZ INSPECTION OF UPPER INTESTINAL TRACT, VIA NATURAL OR ARTIFICIAL OPENING ENDOSCOPIC: ICD-10-PCS | Performed by: INTERNAL MEDICINE

## 2022-01-15 PROCEDURE — 0DJD8ZZ INSPECTION OF LOWER INTESTINAL TRACT, VIA NATURAL OR ARTIFICIAL OPENING ENDOSCOPIC: ICD-10-PCS | Performed by: INTERNAL MEDICINE

## 2022-01-15 RX ORDER — PROPOFOL 10 MG/ML
VIAL (ML) INTRAVENOUS CONTINUOUS PRN
Status: DISCONTINUED | OUTPATIENT
Start: 2022-01-15 | End: 2022-01-15 | Stop reason: SURG

## 2022-01-15 RX ORDER — LIDOCAINE HYDROCHLORIDE 20 MG/ML
INJECTION, SOLUTION INFILTRATION; PERINEURAL AS NEEDED
Status: DISCONTINUED | OUTPATIENT
Start: 2022-01-15 | End: 2022-01-15 | Stop reason: SURG

## 2022-01-15 RX ORDER — PANTOPRAZOLE SODIUM 40 MG/1
40 TABLET, DELAYED RELEASE ORAL
Status: DISCONTINUED | OUTPATIENT
Start: 2022-01-15 | End: 2022-01-16 | Stop reason: HOSPADM

## 2022-01-15 RX ORDER — SODIUM CHLORIDE 9 MG/ML
30 INJECTION, SOLUTION INTRAVENOUS CONTINUOUS PRN
Status: DISCONTINUED | OUTPATIENT
Start: 2022-01-15 | End: 2022-01-16

## 2022-01-15 RX ORDER — SODIUM CHLORIDE, SODIUM LACTATE, POTASSIUM CHLORIDE, CALCIUM CHLORIDE 600; 310; 30; 20 MG/100ML; MG/100ML; MG/100ML; MG/100ML
INJECTION, SOLUTION INTRAVENOUS CONTINUOUS PRN
Status: DISCONTINUED | OUTPATIENT
Start: 2022-01-15 | End: 2022-01-15 | Stop reason: SURG

## 2022-01-15 RX ORDER — SUCRALFATE 1 G/1
1 TABLET ORAL
Status: DISCONTINUED | OUTPATIENT
Start: 2022-01-15 | End: 2022-01-16 | Stop reason: HOSPADM

## 2022-01-15 RX ORDER — PANTOPRAZOLE SODIUM 40 MG/1
40 TABLET, DELAYED RELEASE ORAL
Status: DISCONTINUED | OUTPATIENT
Start: 2022-01-16 | End: 2022-01-15

## 2022-01-15 RX ADMIN — POLYETHYLENE GLYCOL 3350, SODIUM SULFATE ANHYDROUS, SODIUM BICARBONATE, SODIUM CHLORIDE, POTASSIUM CHLORIDE 2000 ML: 236; 22.74; 6.74; 5.86; 2.97 POWDER, FOR SOLUTION ORAL at 06:02

## 2022-01-15 RX ADMIN — Medication 1000 UNITS: at 14:02

## 2022-01-15 RX ADMIN — PROPOFOL 50 MCG/KG/MIN: 10 INJECTION, EMULSION INTRAVENOUS at 11:33

## 2022-01-15 RX ADMIN — SODIUM CHLORIDE 30 ML/HR: 9 INJECTION, SOLUTION INTRAVENOUS at 10:54

## 2022-01-15 RX ADMIN — SODIUM CHLORIDE, POTASSIUM CHLORIDE, SODIUM LACTATE AND CALCIUM CHLORIDE: 600; 310; 30; 20 INJECTION, SOLUTION INTRAVENOUS at 11:33

## 2022-01-15 RX ADMIN — PANTOPRAZOLE SODIUM 40 MG: 40 INJECTION, POWDER, FOR SOLUTION INTRAVENOUS at 14:02

## 2022-01-15 RX ADMIN — LIDOCAINE HYDROCHLORIDE 40 MG: 20 INJECTION, SOLUTION INFILTRATION; PERINEURAL at 11:33

## 2022-01-15 RX ADMIN — ONDANSETRON 4 MG: 2 INJECTION INTRAMUSCULAR; INTRAVENOUS at 00:52

## 2022-01-15 RX ADMIN — CALCITRIOL 0.25 MCG: 0.25 CAPSULE ORAL at 14:02

## 2022-01-15 RX ADMIN — SUCRALFATE 1 G: 1 TABLET ORAL at 18:46

## 2022-01-15 RX ADMIN — SUCRALFATE 1 G: 1 TABLET ORAL at 21:19

## 2022-01-15 RX ADMIN — SEVELAMER CARBONATE 2400 MG: 800 TABLET, FILM COATED ORAL at 14:03

## 2022-01-15 NOTE — PLAN OF CARE
Goal Outcome Evaluation:  Plan of Care Reviewed With: patient           Outcome Summary: EGD and colonscopy this am.  MD discussed findings with pt.  Previous diet resumed per MD.  Pt filled with dialysate at 1400 and will complete an exchange at 1800.  Pt with no c/o pain after procedure this am.  All needs met this shift, VSS, will continue to monitor

## 2022-01-15 NOTE — ANESTHESIA POSTPROCEDURE EVALUATION
Patient: Fany Todd    Procedure Summary     Date: 01/15/22 Room / Location:  [facility]    Anesthesia Start: 1130 Anesthesia Stop: 1202    Procedures:       ESOPHAGOGASTRODUODENOSCOPY (N/A Esophagus)      COLONOSCOPY TO CECUM (N/A ) Diagnosis:       Anemia, unspecified type      Heme positive stool      (Anemia, unspecified type [D64.9])      (Heme positive stool [R19.5])    Surgeons: Gilmar Rosen MD Provider: Garfield Steiner MD    Anesthesia Type: MAC ASA Status: 3          Anesthesia Type: MAC    Vitals  No vitals data found for the desired time range.          Post Anesthesia Care and Evaluation    Patient location during evaluation: PACU  Patient participation: complete - patient participated  Level of consciousness: awake and alert  Pain management: adequate  Airway patency: patent  Anesthetic complications: No anesthetic complications    Cardiovascular status: acceptable  Respiratory status: acceptable  Hydration status: acceptable    Comments: --------------------            01/15/22               1052     --------------------   BP:       104/78     Pulse:      84       Resp:       16       Temp:                SpO2:      99%      --------------------

## 2022-01-15 NOTE — PROGRESS NOTES
"Daily progress note    Complaint complaint  Doing same  No new complaints  Going for endoscopy  Denies any abdominal pain nausea vomiting diarrhea    History of present illness  76 white female with history of end-stage renal disease on peritoneal dialysis hypertension hyperlipidemia and chronic anemia presented to [facility] emergency room with abdominal pain with dry heaving nausea and vomited couple of times.  Patient denies any diarrhea but had 1 episode.  Patient denies any fever or chills.  Patient denies any black stools or blood in the stools.  Patient work-up in ER revealed persistent nausea vomiting secondary to uremia and possible infection secondary to peritonitis catheter admitted for management     REVIEW OF SYSTEMS  Unremarkable except generalized weakness    PHYSICAL EXAM   Blood pressure 108/61, pulse 68, temperature 98 °F (36.7 °C), temperature source Oral, resp. rate 18, height 160 cm (63\"), weight 52 kg (114 lb 10.2 oz), SpO2 97 %.    Constitutional:  Awake and alert in mild distress secondary to nausea and pain  HENT: Normocephalic and atraumatic. Oropharynx moist/nonerythematous.  Neck: Normal range of motion. Neck supple. No JVD present.   Cardiovascular: Normal rate, regular rhythm and normal heart sounds.  Pulmonary/Chest: Effort normal and breath sounds normal. No stridor. No wheezes, no rales.   Abdominal:  Soft mild diffuse tenderness bowel sounds positive  Musculoskeletal: Normal range of motion. No edema, tenderness or deformity.   Neurological: Pt. is alert and oriented to person, place, and time. No focal neurologic deficits  Skin: Skin is warm and dry. No rash noted. Pt. is not diaphoretic. No erythema.   Psychiatric: Mood, affect and judgment normal.  She is pleasant and cooperative.    LAB RESULTS  Lab Results (last 24 hours)     Procedure Component Value Units Date/Time    Renal Function Panel [[id number]]  (Abnormal) Collected: 01/15/22 0544    Specimen: Blood Updated: " 01/15/22 0721     Glucose 108 mg/dL      BUN 40 mg/dL      Creatinine 8.29 mg/dL      Sodium 135 mmol/L      Potassium 3.5 mmol/L      Chloride 90 mmol/L      CO2 27.9 mmol/L      Calcium 8.9 mg/dL      Albumin 2.70 g/dL      Phosphorus 6.1 mg/dL      Anion Gap 17.1 mmol/L      BUN/Creatinine Ratio 4.8     eGFR Non African Amer 5 mL/min/1.73      Comment: <15 Indicative of kidney failure.        eGFR   Amer --     Comment: <15 Indicative of kidney failure.       Narrative:      GFR Normal >60  Chronic Kidney Disease <60  Kidney Failure <15      CBC & Differential [[id number]]  (Abnormal) Collected: 01/15/22 0544    Specimen: Blood Updated: 01/15/22 0718    Narrative:      The following orders were created for panel order CBC & Differential.  Procedure                               Abnormality         Status                     ---------                               -----------         ------                     CBC Auto Differential[[id number]]        Abnormal            Final result                 Please view results for these tests on the individual orders.    CBC Auto Differential [[id number]]  (Abnormal) Collected: 01/15/22 0544    Specimen: Blood Updated: 01/15/22 0718     WBC 7.76 10*3/mm3      RBC 3.18 10*6/mm3      Hemoglobin 9.6 g/dL      Hematocrit 27.9 %      MCV 87.7 fL      MCH 30.2 pg      MCHC 34.4 g/dL      RDW 17.2 %      RDW-SD 54.9 fl      MPV 9.6 fL      Platelets 398 10*3/mm3      Neutrophil % 73.4 %      Lymphocyte % 13.1 %      Monocyte % 8.6 %      Eosinophil % 3.6 %      Basophil % 0.4 %      Immature Grans % 0.9 %      Neutrophils, Absolute 5.69 10*3/mm3      Lymphocytes, Absolute 1.02 10*3/mm3      Monocytes, Absolute 0.67 10*3/mm3      Eosinophils, Absolute 0.28 10*3/mm3      Basophils, Absolute 0.03 10*3/mm3      Immature Grans, Absolute 0.07 10*3/mm3      nRBC 0.0 /100 WBC     COVID PRE-OP / PRE-PROCEDURE SCREENING ORDER (NO ISOLATION) - Swab, Nasopharynx [[id number]]  (Normal)  Collected: 01/14/22 1746    Specimen: Swab from Nasopharynx Updated: 01/14/22 1901    Narrative:      The following orders were created for panel order COVID PRE-OP / PRE-PROCEDURE SCREENING ORDER (NO ISOLATION) - Swab, Nasopharynx.  Procedure                               Abnormality         Status                     ---------                               -----------         ------                     COVID-19,[facility] IN-HOUSE...[[id number]]  Normal              Final result                 Please view results for these tests on the individual orders.    COVID-19,[facility]-HOUSE CEPHEID/JAM NP SWAB IN TRANSPORT MEDIA 8-12 HR TAT - Swab, Nasopharynx [[id number]]  (Normal) Collected: 01/14/22 1746    Specimen: Swab from Nasopharynx Updated: 01/14/22 1901     COVID19 Not Detected    Narrative:      Fact sheet for providers: https://www.fda.gov/media/253909/download    Fact sheet for patients: https://www.fda.gov/media/345665/download    Test performed by PCR.        Imaging Results (Last 24 Hours)     ** No results found for the last 24 hours. **             ECG 12 Lead  Component   Ref Range & Units 1/10/22 2340 12/28/21 0955   QT Interval   ms 339  395              HEART RATE= 98  bpm  RR Interval= 612  ms  WI Interval= 153  ms  P Horizontal Axis= -2  deg  P Front Axis= 71  deg  QRSD Interval= 63  ms  QT Interval= 339  ms  QRS Axis= 13  deg  T Wave Axis= 175  deg  - ABNORMAL ECG -  Sinus rhythm  Nonspecific T abnormalities, lateral leads  When compared with ECG of 28-Dec-2021 9:55:52,  Significant rate increase otherwise no change              Current Facility-Administered Medications:   •  calcitriol (ROCALTROL) capsule 0.25 mcg, 0.25 mcg, Oral, Daily, Gilmar Rosen MD, 0.25 mcg at 01/15/22 1402  •  cholecalciferol (VITAMIN D3) tablet 1,000 Units, 1,000 Units, Oral, Daily, Gilmar Rosen MD, 1,000 Units at 01/15/22 1402  •  HYDROcodone-acetaminophen (NORCO) 5-325 MG per tablet 1 tablet, 1 tablet,  Oral, Q6H PRN, Gilmar Rosen MD, 1 tablet at 01/14/22 0112  •  levothyroxine (SYNTHROID, LEVOTHROID) tablet 25 mcg, 25 mcg, Oral, Q AM, Gilmar Rosen MD, 25 mcg at 01/14/22 0612  •  ondansetron (ZOFRAN) injection 4 mg, 4 mg, Intravenous, Q4H PRN, Gilmar Rosen MD, 4 mg at 01/15/22 0052  •  pantoprazole (PROTONIX) injection 40 mg, 40 mg, Intravenous, Q12H, Gilmar Rosen MD, 40 mg at 01/15/22 1402  •  sevelamer (RENVELA) tablet 2,400 mg, 2,400 mg, Oral, TID With Meals, Gilmar Rosen MD, 2,400 mg at 01/15/22 1403  •  sodium chloride 0.9 % flush 10 mL, 10 mL, Intravenous, PRN, Gilmar Rosen MD, 10 mL at 01/13/22 0818  •  sodium chloride 0.9 % infusion, 30 mL/hr, Intravenous, Continuous PRN, Gimlar Rosen MD, Stopped at 01/15/22 1218  •  sucralfate (CARAFATE) tablet 1 g, 1 g, Oral, 4x Daily AC & at Bedtime, Gilmar Rosen MD     ASSESSMENT  Abdominal pain with nausea vomiting resolving  No evidence of peritoneal dialysis catheter site infection  End-stage renal disease on peritoneal dialysis  Heme-positive stools  Hypertension  Hyperlipidemia  Hypothyroidism  Chronic anemia   Gastroesophageal reflux disease    PLAN  CPM  Serial H&H  Transfuse as needed  Discontinue aspirin  Upper and lower endoscopy today  Nephrology to follow patient for peritoneal dialysis  Continue home medications  Stress ulcer DVT prophylaxis  Supportive care  Discussed with nursing staff and gastroenterology  Follow closely and further recommendation current hospital course    BENITO SCHWARTZ MD

## 2022-01-15 NOTE — ANESTHESIA PREPROCEDURE EVALUATION
Anesthesia Evaluation     Patient summary reviewed and Nursing notes reviewed                Airway   Mallampati: II  TM distance: >3 FB  Dental      Pulmonary - negative pulmonary ROS   Cardiovascular     ECG reviewed  Rhythm: regular  Rate: normal    (+) hypertension, hyperlipidemia,       Neuro/Psych- negative ROS  GI/Hepatic/Renal/Endo    (+)   renal disease dialysis and ESRD,     Musculoskeletal (-) negative ROS    Abdominal    Substance History - negative use     OB/GYN negative ob/gyn ROS         Other      history of cancer                    Anesthesia Plan    ASA 3     MAC   (I have reviewed the patient's history with the patient and the chart, including all pertinent laboratory results and imaging. I have explained the risks of anesthesia including but not limited to dental damage, corneal abrasion, nerve injury, MI, stroke, and death. Questions asked and answered. Anesthetic plan discussed with patient and team as indicated. Patient expressed understanding of the above.  )  intravenous induction     Anesthetic plan, all risks, benefits, and alternatives have been provided, discussed and informed consent has been obtained with: patient.

## 2022-01-16 ENCOUNTER — READMISSION MANAGEMENT (OUTPATIENT)
Dept: CALL CENTER | Facility: HOSPITAL | Age: 77
End: 2022-01-16

## 2022-01-16 VITALS
DIASTOLIC BLOOD PRESSURE: 67 MMHG | SYSTOLIC BLOOD PRESSURE: 107 MMHG | HEART RATE: 74 BPM | OXYGEN SATURATION: 95 % | BODY MASS INDEX: 20.86 KG/M2 | HEIGHT: 63 IN | WEIGHT: 117.73 LBS | TEMPERATURE: 97.6 F | RESPIRATION RATE: 16 BRPM

## 2022-01-16 LAB
ALBUMIN SERPL-MCNC: 2.7 G/DL (ref 3.5–5.2)
ANION GAP SERPL CALCULATED.3IONS-SCNC: 16.2 MMOL/L (ref 5–15)
BASOPHILS # BLD AUTO: 0.02 10*3/MM3 (ref 0–0.2)
BASOPHILS NFR BLD AUTO: 0.3 % (ref 0–1.5)
BUN SERPL-MCNC: 37 MG/DL (ref 8–23)
BUN/CREAT SERPL: 4.4 (ref 7–25)
CALCIUM SPEC-SCNC: 8.5 MG/DL (ref 8.6–10.5)
CHLORIDE SERPL-SCNC: 91 MMOL/L (ref 98–107)
CO2 SERPL-SCNC: 28.8 MMOL/L (ref 22–29)
CREAT SERPL-MCNC: 8.49 MG/DL (ref 0.57–1)
DEPRECATED RDW RBC AUTO: 55.7 FL (ref 37–54)
EOSINOPHIL # BLD AUTO: 0.27 10*3/MM3 (ref 0–0.4)
EOSINOPHIL NFR BLD AUTO: 4.3 % (ref 0.3–6.2)
ERYTHROCYTE [DISTWIDTH] IN BLOOD BY AUTOMATED COUNT: 17.4 % (ref 12.3–15.4)
FOLATE BLD-MCNC: 620 NG/ML
FOLATE RBC-MCNC: 2322 NG/ML
GFR SERPL CREATININE-BSD FRML MDRD: 5 ML/MIN/1.73
GFR SERPL CREATININE-BSD FRML MDRD: ABNORMAL ML/MIN/{1.73_M2}
GLUCOSE SERPL-MCNC: 108 MG/DL (ref 65–99)
HCT VFR BLD AUTO: 26.7 % (ref 34–46.6)
HCT VFR BLD AUTO: 27.4 % (ref 34–46.6)
HGB BLD-MCNC: 9.3 G/DL (ref 12–15.9)
IMM GRANULOCYTES # BLD AUTO: 0.05 10*3/MM3 (ref 0–0.05)
IMM GRANULOCYTES NFR BLD AUTO: 0.8 % (ref 0–0.5)
LYMPHOCYTES # BLD AUTO: 0.97 10*3/MM3 (ref 0.7–3.1)
LYMPHOCYTES NFR BLD AUTO: 15.3 % (ref 19.6–45.3)
MCH RBC QN AUTO: 30.2 PG (ref 26.6–33)
MCHC RBC AUTO-ENTMCNC: 33.9 G/DL (ref 31.5–35.7)
MCV RBC AUTO: 89 FL (ref 79–97)
MONOCYTES # BLD AUTO: 0.61 10*3/MM3 (ref 0.1–0.9)
MONOCYTES NFR BLD AUTO: 9.7 % (ref 5–12)
NEUTROPHILS NFR BLD AUTO: 4.4 10*3/MM3 (ref 1.7–7)
NEUTROPHILS NFR BLD AUTO: 69.6 % (ref 42.7–76)
NRBC BLD AUTO-RTO: 0 /100 WBC (ref 0–0.2)
PHOSPHATE SERPL-MCNC: 5 MG/DL (ref 2.5–4.5)
PLATELET # BLD AUTO: 408 10*3/MM3 (ref 140–450)
PMV BLD AUTO: 9.6 FL (ref 6–12)
POTASSIUM SERPL-SCNC: 3.6 MMOL/L (ref 3.5–5.2)
RBC # BLD AUTO: 3.08 10*6/MM3 (ref 3.77–5.28)
SODIUM SERPL-SCNC: 136 MMOL/L (ref 136–145)
WBC NRBC COR # BLD: 6.32 10*3/MM3 (ref 3.4–10.8)

## 2022-01-16 PROCEDURE — 85025 COMPLETE CBC W/AUTO DIFF WBC: CPT | Performed by: HOSPITALIST

## 2022-01-16 PROCEDURE — 80069 RENAL FUNCTION PANEL: CPT | Performed by: INTERNAL MEDICINE

## 2022-01-16 PROCEDURE — 99232 SBSQ HOSP IP/OBS MODERATE 35: CPT | Performed by: NURSE PRACTITIONER

## 2022-01-16 RX ORDER — SEVELAMER CARBONATE 800 MG/1
2400 TABLET, FILM COATED ORAL
Qty: 270 TABLET | Refills: 0 | Status: SHIPPED | OUTPATIENT
Start: 2022-01-16 | End: 2022-02-15

## 2022-01-16 RX ORDER — LEVOTHYROXINE SODIUM 0.03 MG/1
25 TABLET ORAL
Qty: 30 TABLET | Refills: 0 | Status: SHIPPED | OUTPATIENT
Start: 2022-01-17 | End: 2022-06-06

## 2022-01-16 RX ORDER — PANTOPRAZOLE SODIUM 40 MG/1
40 TABLET, DELAYED RELEASE ORAL
Qty: 60 TABLET | Refills: 0 | Status: SHIPPED | OUTPATIENT
Start: 2022-01-16 | End: 2022-02-05 | Stop reason: HOSPADM

## 2022-01-16 RX ORDER — SUCRALFATE 1 G/1
1 TABLET ORAL
Qty: 120 TABLET | Refills: 0 | Status: SHIPPED | OUTPATIENT
Start: 2022-01-16 | End: 2022-02-15

## 2022-01-16 RX ADMIN — PANTOPRAZOLE SODIUM 40 MG: 40 TABLET, DELAYED RELEASE ORAL at 06:52

## 2022-01-16 RX ADMIN — HYDROCODONE BITARTRATE AND ACETAMINOPHEN 1 TABLET: 5; 325 TABLET ORAL at 02:29

## 2022-01-16 RX ADMIN — CALCITRIOL 0.25 MCG: 0.25 CAPSULE ORAL at 10:20

## 2022-01-16 RX ADMIN — Medication 1000 UNITS: at 10:20

## 2022-01-16 RX ADMIN — SUCRALFATE 1 G: 1 TABLET ORAL at 06:52

## 2022-01-16 RX ADMIN — SEVELAMER CARBONATE 2400 MG: 800 TABLET, FILM COATED ORAL at 12:13

## 2022-01-16 RX ADMIN — LEVOTHYROXINE SODIUM 25 MCG: 0.03 TABLET ORAL at 06:52

## 2022-01-16 NOTE — PLAN OF CARE
Goal Outcome Evaluation:  Plan of Care Reviewed With: patient    Pt being discharged home this afternoon.  Two PD exchanges completed and catheter dressing changed this am.  Discharge meds being sent to pt home pharmacy per pt request.  VSS, all needs met this shift, will continue to monitor

## 2022-01-16 NOTE — PROGRESS NOTES
[facility] Progress Note      Patient Name: Fany Todd  : 1945  MRN: [id number]  Primary Care Physician:  Lilibeth Cota MD  Date of admission: 1/10/2022    Subjective     Interval History:   Follow-up end-stage renal disease on peritoneal dialysis    Feeling better overall  Abdominal pain significantly improved  CAPD proceeding uneventfully    Review of Systems:   As noted above    Objective     Vitals:   Temp:  [97.4 °F (36.3 °C)-98.6 °F (37 °C)] 97.5 °F (36.4 °C)  Heart Rate:  [68-93] 93  Resp:  [16-18] 16  BP: (104-134)/(41-71) 113/64    Intake/Output Summary (Last 24 hours) at 2022 1200  Last data filed at 2022 1043  Gross per 24 hour   Intake 04225 ml   Output 8700 ml   Net 1990 ml       Physical Exam:    Constitutional: Awake, alert, no acute distress.  Appears to be chronically ill and frail  HEENT: Sclera anicteric  Neck: trachea at midline, no JVD  Respiratory: Clear to auscultation bilaterally  Cardiovascular: RRR, no murmurs, no rubs or gallops  Gastrointestinal: abdomen is soft, nontender and nondistended, PD catheter in place exit site is clean and nontender  Musculoskeletal: No edema  Neurologic: Oriented x3, moving all extremities    Scheduled Meds:     calcitriol, 0.25 mcg, Oral, Daily  cholecalciferol, 1,000 Units, Oral, Daily  levothyroxine, 25 mcg, Oral, Q AM  pantoprazole, 40 mg, Oral, BID AC  sevelamer, 2,400 mg, Oral, TID With Meals  sucralfate, 1 g, Oral, 4x Daily AC & at Bedtime      IV Meds:   sodium chloride, 30 mL/hr, Last Rate: Stopped (01/15/22 1218)        Results Reviewed:   I have personally reviewed the results from the time of this admission to 2022 12:00 EST     Results from last 7 days   Lab Units 01/15/22  0544 22  0712 22  0941 22  0834 22  0834 01/10/22  1812 01/10/22  1812   SODIUM mmol/L 135* 134* 136   < > 135*   < > 137   POTASSIUM mmol/L 3.5 3.9 4.1   < > 3.9   < > 5.1   CHLORIDE mmol/L  90* 93* 95*   < > 93*   < > 91*   CO2 mmol/L 27.9 25.0 27.3   < > 25.7   < > 24.7   BUN mg/dL 40* 51* 54*   < > 67*   < > 78*   CREATININE mg/dL 8.29* 9.46* 10.34*   < > 12.27*   < > 12.19*   CALCIUM mg/dL 8.9 8.4* 8.4*   < > 7.9*   < > 8.7   BILIRUBIN mg/dL  --   --   --   --  <0.2  --  0.2   ALK PHOS U/L  --   --   --   --  77  --  107   ALT (SGPT) U/L  --   --   --   --  5  --  7   AST (SGOT) U/L  --   --   --   --  12  --  13   GLUCOSE mg/dL 108* 121* 110*   < > 121*   < > 132*    < > = values in this interval not displayed.       Estimated Creatinine Clearance: 4.9 mL/min (A) (by C-G formula based on SCr of 8.29 mg/dL (H)).    Results from last 7 days   Lab Units 01/15/22  0544 01/14/22  0712 01/10/22  1812   MAGNESIUM mg/dL  --   --  1.8   PHOSPHORUS mg/dL 6.1* 7.2*  --              Results from last 7 days   Lab Units 01/15/22  0544 01/14/22  0712 01/13/22  0941 01/12/22  1206 01/12/22  0834 01/10/22  1812 01/10/22  1812   WBC 10*3/mm3 7.76 7.32 11.88*  --  13.20*  --  13.89*   HEMOGLOBIN g/dL 9.6* 9.1* 8.9* 5.4* 5.1*   < > 8.8*   PLATELETS 10*3/mm3 398 357 317  --  318  --  444    < > = values in this interval not displayed.             Assessment / Plan     ASSESSMENT:  1. ESRD on peritoneal dialysis  2.  Hypertension  3.  Anemia of chronic kidney disease  4. history of breast cancer of the left breast with mastectomy treated surgically with no evidence of recurrence  5.  Hyperphosphatemia  6. protein calorie malnutrition  7.  Status post EGD/colonoscopy on 1/15 with evidence of duodenal ulcer, sent for H. pylori, currently on PPI twice daily    PLAN:  1.  Continue same PD prescription with alternating 2.5/1.5% dextrose solutions, 2 L for 5 exchanges daily  2.  Surveillance labs    Lobito Zhu MD  01/16/22  12:00 [facility]    [facility]  [phone]

## 2022-01-16 NOTE — PROGRESS NOTES
"Daily progress note    Complaint complaint  Doing better  No new complaints  Wants to go home  Denies any abdominal pain nausea vomiting diarrhea    History of present illness  76 white female with history of end-stage renal disease on peritoneal dialysis hypertension hyperlipidemia and chronic anemia presented to [facility] emergency room with abdominal pain with dry heaving nausea and vomited couple of times.  Patient denies any diarrhea but had 1 episode.  Patient denies any fever or chills.  Patient denies any black stools or blood in the stools.  Patient work-up in ER revealed persistent nausea vomiting secondary to uremia and possible infection secondary to peritonitis catheter admitted for management     REVIEW OF SYSTEMS  Unremarkable except generalized weakness    PHYSICAL EXAM   Blood pressure 113/64, pulse 93, temperature 97.5 °F (36.4 °C), temperature source Oral, resp. rate 16, height 160 cm (63\"), weight 53.4 kg (117 lb 11.6 oz), SpO2 95 %.    Constitutional:  Awake and alert in mild distress secondary to nausea and pain  HENT: Normocephalic and atraumatic. Oropharynx moist/nonerythematous.  Neck: Normal range of motion. Neck supple. No JVD present.   Cardiovascular: Normal rate, regular rhythm and normal heart sounds.  Pulmonary/Chest: Effort normal and breath sounds normal. No stridor. No wheezes, no rales.   Abdominal:  Soft mild diffuse tenderness bowel sounds positive  Musculoskeletal: Normal range of motion. No edema, tenderness or deformity.   Neurological: Pt. is alert and oriented to person, place, and time. No focal neurologic deficits  Skin: Skin is warm and dry. No rash noted. Pt. is not diaphoretic. No erythema.   Psychiatric: Mood, affect and judgment normal.  She is pleasant and cooperative.    LAB RESULTS  Lab Results (last 24 hours)     ** No results found for the last 24 hours. **        Imaging Results (Last 24 Hours)     ** No results found for the last 24 hours. **    "          ECG 12 Lead  Component   Ref Range & Units 1/10/22 2340 12/28/21 0955   QT Interval   ms 339  395              HEART RATE= 98  bpm  RR Interval= 612  ms  FL Interval= 153  ms  P Horizontal Axis= -2  deg  P Front Axis= 71  deg  QRSD Interval= 63  ms  QT Interval= 339  ms  QRS Axis= 13  deg  T Wave Axis= 175  deg  - ABNORMAL ECG -  Sinus rhythm  Nonspecific T abnormalities, lateral leads  When compared with ECG of 28-Dec-2021 9:55:52,  Significant rate increase otherwise no change            Upper and lower endoscopy noted and results discussed with patient    Current Facility-Administered Medications:   •  calcitriol (ROCALTROL) capsule 0.25 mcg, 0.25 mcg, Oral, Daily, Gilmar Rosen MD, 0.25 mcg at 01/16/22 1020  •  cholecalciferol (VITAMIN D3) tablet 1,000 Units, 1,000 Units, Oral, Daily, Gilmar Rosen MD, 1,000 Units at 01/16/22 1020  •  HYDROcodone-acetaminophen (NORCO) 5-325 MG per tablet 1 tablet, 1 tablet, Oral, Q6H PRN, Gilmar Rosen MD, 1 tablet at 01/16/22 0229  •  levothyroxine (SYNTHROID, LEVOTHROID) tablet 25 mcg, 25 mcg, Oral, Q AM, Gilmar Rosen MD, 25 mcg at 01/16/22 0652  •  ondansetron (ZOFRAN) injection 4 mg, 4 mg, Intravenous, Q4H PRN, Gilmar Rosen MD, 4 mg at 01/15/22 0052  •  pantoprazole (PROTONIX) EC tablet 40 mg, 40 mg, Oral, BID AC, Matthew Mendoza MD, 40 mg at 01/16/22 0652  •  sevelamer (RENVELA) tablet 2,400 mg, 2,400 mg, Oral, TID With Meals, Gilmar Rosen MD, 2,400 mg at 01/16/22 1213  •  sodium chloride 0.9 % flush 10 mL, 10 mL, Intravenous, PRN, Gilmar Rosen MD, 10 mL at 01/13/22 0818  •  sodium chloride 0.9 % infusion, 30 mL/hr, Intravenous, Continuous PRN, Gilmar Rosen MD, Stopped at 01/15/22 1218  •  sucralfate (CARAFATE) tablet 1 g, 1 g, Oral, 4x Daily AC & at Bedtime, Gilmar Rosen MD, 1 g at 01/16/22 0652     ASSESSMENT  Nonbleeding large duodenal ulcer  No evidence of peritoneal dialysis  catheter site infection  End-stage renal disease on peritoneal dialysis  Acute blood loss anemia  Hypertension  Hyperlipidemia  Hypothyroidism  Chronic anemia   Gastroesophageal reflux disease    PLAN  Discharge home on PPI and Carafate with repeat upper endoscopy in 8 weeks  Discharge summary dictated    BENITO SCHWARTZ MD

## 2022-01-16 NOTE — DISCHARGE SUMMARY
Discharge summary    Date of admission 1/10/2022  Date of discharge 1/16/2022    Final diagnosis  Nonbleeding large duodenal ulcer  End-stage renal disease on peritoneal dialysis  Acute blood loss anemia  Hypertension  Hyperlipidemia  Hypothyroidism  Chronic anemia   Gastroesophageal reflux disease    Discharge medications    Current Facility-Administered Medications:   •  calcitriol (ROCALTROL) capsule 0.25 mcg, 0.25 mcg, Oral, Daily, Gilmar Rosen MD, 0.25 mcg at 01/16/22 1020  •  cholecalciferol (VITAMIN D3) tablet 1,000 Units, 1,000 Units, Oral, Daily, Gilmar Rosen MD, 1,000 Units at 01/16/22 1020  •  levothyroxine (SYNTHROID, LEVOTHROID) tablet 25 mcg, 25 mcg, Oral, Q AM, Gilmar Rosen MD, 25 mcg at 01/16/22 0652  •  pantoprazole (PROTONIX) EC tablet 40 mg, 40 mg, Oral, BID AC, Matthew Mendoza MD, 40 mg at 01/16/22 0652  •  sevelamer (RENVELA) tablet 2,400 mg, 2,400 mg, Oral, TID With Meals, Gilmar Rosen MD, 2,400 mg at 01/16/22 1213  •  sucralfate (CARAFATE) tablet 1 g, 1 g, Oral, 4x Daily AC & at Bedtime, Gilmar Rosen MD, 1 g at 01/16/22 0652     Consults obtained  Nephrology  Gastroenterology    Procedures  Upper and lower endoscopy    Hospital course  76-year-old female with history of end-stage renal disease on peritoneal dialysis admitted to emergency room with severe abdominal pain with nausea vomiting. Patient denies any diarrhea fever cough chest pain or shortness of breath. Patient also denies any black stools or bloody stools or hematemesis. Patient has peritoneal dialysis catheter and further evaluated by general surgery and recommend no evidence of any catheter dysfunction or infection. Patient hemoglobin dropped with hydration and received blood transfusion and her Hemoccult test came back positive. Patient further evaluated by GI and recommend upper and lower endoscopy. Patient underwent endoscopy yesterday which revealed nonbleeding large duodenal  ulcer and lower endoscopy was unremarkable except for blood in the transverse colon. Patient hemoglobin remained stable after transfusion and at the time of discharge is 9.6. Patient is going home on Protonix and Carafate and repeat endoscopy in 8 weeks. Patient also followed by nephrology for peritoneal dialysis. Patient is tolerating diet and cleared for discharge. Patient also found to be mild hypothyroidism and started on small dose of Synthroid.    Discharge diet regular    Activity as tolerated    Medication as above    Follow-up with prime doctor in 1 week and follow-up with gastroenterology and nephrology per his instruction and take medication as directed.    BENITO SCHWARTZ MD

## 2022-01-16 NOTE — PROGRESS NOTES
Gastroenterology   Inpatient Progress Note    Reason for Follow Up:  GI bleed, abdominal pain    Subjective  Interval History:   January 15, 2022 colonoscopy with hematin secondary to large duodenal ulcer.  No active bleeding did on EGD if further evidence of bleeding, could consider IR consult for angiography  January 15, 2022 EGD.  1 large nonbleeding cratered duodenal ulcer with a flat pigmented spot in the first portion of duodenum, exact size unclear given significant surrounding edema  No friable bleeding noted    Abdominal pain improved, tolerating oral intake    Current Facility-Administered Medications:   •  calcitriol (ROCALTROL) capsule 0.25 mcg, 0.25 mcg, Oral, Daily, Gilmar Rosen MD, 0.25 mcg at 01/15/22 1402  •  cholecalciferol (VITAMIN D3) tablet 1,000 Units, 1,000 Units, Oral, Daily, Gilmar Rosen MD, 1,000 Units at 01/15/22 1402  •  HYDROcodone-acetaminophen (NORCO) 5-325 MG per tablet 1 tablet, 1 tablet, Oral, Q6H PRN, Gilmar Rosen MD, 1 tablet at 01/16/22 0229  •  levothyroxine (SYNTHROID, LEVOTHROID) tablet 25 mcg, 25 mcg, Oral, Q AM, Gilmar Rosen MD, 25 mcg at 01/16/22 0652  •  ondansetron (ZOFRAN) injection 4 mg, 4 mg, Intravenous, Q4H PRN, Gilmar Rosen MD, 4 mg at 01/15/22 0052  •  pantoprazole (PROTONIX) EC tablet 40 mg, 40 mg, Oral, BID AC, Matthew Mendoza MD, 40 mg at 01/16/22 0652  •  sevelamer (RENVELA) tablet 2,400 mg, 2,400 mg, Oral, TID With Meals, Gilmar Rosen MD, 2,400 mg at 01/15/22 1403  •  sodium chloride 0.9 % flush 10 mL, 10 mL, Intravenous, PRN, Gilmar Rosen MD, 10 mL at 01/13/22 0818  •  sodium chloride 0.9 % infusion, 30 mL/hr, Intravenous, Continuous PRN, Gilmar Rosen MD, Stopped at 01/15/22 1218  •  sucralfate (CARAFATE) tablet 1 g, 1 g, Oral, 4x Daily AC & at Bedtime, Gilmar Rosen MD, 1 g at 01/16/22 0632  Review of Systems:               The following systems were reviewed and negative;   gastrointestinal    Objective     Vital Signs  Temp:  [97.4 °F (36.3 °C)-98.6 °F (37 °C)] 97.5 °F (36.4 °C)  Heart Rate:  [68-93] 93  Resp:  [16-18] 16  BP: (104-134)/(41-78) 113/64  Body mass index is 20.85 kg/m².                  Physical Exam:              General: patient awake, alert and cooperative              Eyes: no scleral icterus              Skin: warm and dry, not jaundiced              Abdomen: soft, nontender, nondistended; normal bowel sounds              Psychiatric: Appropriate affect and behavior                Results Review:                I reviewed the patient's new clinical results.    Results from last 7 days   Lab Units 01/15/22  0544 01/14/22  0712 01/13/22  0941   WBC 10*3/mm3 7.76 7.32 11.88*   HEMOGLOBIN g/dL 9.6* 9.1* 8.9*   HEMATOCRIT % 27.9* 27.7* 27.1*   PLATELETS 10*3/mm3 398 357 317     Results from last 7 days   Lab Units 01/15/22  0544 01/14/22  0712 01/13/22  0941 01/12/22  0834 01/12/22  0834 01/10/22  1812 01/10/22  1812   SODIUM mmol/L 135* 134* 136   < > 135*   < > 137   POTASSIUM mmol/L 3.5 3.9 4.1   < > 3.9   < > 5.1   CHLORIDE mmol/L 90* 93* 95*   < > 93*   < > 91*   CO2 mmol/L 27.9 25.0 27.3   < > 25.7   < > 24.7   BUN mg/dL 40* 51* 54*   < > 67*   < > 78*   CREATININE mg/dL 8.29* 9.46* 10.34*   < > 12.27*   < > 12.19*   CALCIUM mg/dL 8.9 8.4* 8.4*   < > 7.9*   < > 8.7   BILIRUBIN mg/dL  --   --   --   --  <0.2  --  0.2   ALK PHOS U/L  --   --   --   --  77  --  107   ALT (SGPT) U/L  --   --   --   --  5  --  7   AST (SGOT) U/L  --   --   --   --  12  --  13   GLUCOSE mg/dL 108* 121* 110*   < > 121*   < > 132*    < > = values in this interval not displayed.       Radiology:    Assessment/Plan     Patient Active Problem List   Diagnosis   • Anemia   • History of breast cancer   • ESRD (end stage renal disease) (HCC)   • Generalized abdominal pain   • Heme positive stool       Assessment:  1. Nonbleeding duodenal ulcer on recent EGD  2. Nausea and vomiting prior to  admission, resolved  3. End-stage renal disease on peritoneal dialysis      These problems are new to me    Plan:  Reviewed EGD and colonoscopy  Follow-up on H. pylori testing  Twice daily PPI and Carafate      · Follow-up EGD in 8 to 10 weeks to ensure healing    I discussed the patients findings and my recommendations with patient and nursing staff.           Cynthia SPRING  [facility], [place], operated by [facility] [id number] [place] [address]

## 2022-01-17 NOTE — OUTREACH NOTE
Prep Survey      Responses   [facility] facility patient discharged from? [place]   Is LACE score < 7 ? No   Emergency Room discharge w/ pulse ox? No   Eligibility Readm Mgmt   Discharge diagnosis Nonbleeding large duodenal ulcerendoscopy    Does the patient have one of the following disease processes/diagnoses(primary or secondary)? Other   Does the patient have Home health ordered? No   Is there a DME ordered? No   Comments regarding appointments [facility]    Prep survey completed? Yes          Shakira Souza RN

## 2022-01-17 NOTE — CASE MANAGEMENT/SOCIAL WORK
Case Management Discharge Note      Final Note: DC'd home 1/16    Provided Post Acute Provider List?: N/A  N/A Provider List Comment: Denies HH/SNF needs.  Provided Post Acute Provider Quality & Resource List?: N/A  N/A Quality & Resource List Comment: Denies HH/SNF needs.        Home Medical Care Coordination complete.    Service Provider Selected Services Address Phone Fax Patient Preferred    [facility]  Home Peritoneal Dialysis [address] [place] [address] [phone] --                  Transportation Services  Private: Car    Final Discharge Disposition Code: 01 - home or self-care

## 2022-01-18 ENCOUNTER — READMISSION MANAGEMENT (OUTPATIENT)
Dept: CALL CENTER | Facility: HOSPITAL | Age: 77
End: 2022-01-18

## 2022-01-18 NOTE — OUTREACH NOTE
Medical Week 1 Survey      Responses   [facility] patient discharged from? [place]   Does the patient have one of the following disease processes/diagnoses(primary or secondary)? Other   Week 1 attempt successful? Yes   Call start time 1603   Call end time 1606   Discharge diagnosis Nonbleeding large duodenal ulcerendoscopy    Meds reviewed with patient/caregiver? Yes   Is the patient having any side effects they believe may be caused by any medication additions or changes? No   Does the patient have all medications ordered at discharge? No   Notified Case Management Script issues   Prescription comments Pt states she told them to send to [facility].  Looks like prescription was sent to [facility] pharmacy.  Pt is afraid her insurance will not cover it and would like for it to go to [facility] and [place].     Does the patient have a primary care provider?  Yes   What is preventing the patient from scheduling follow up appointments within 7 days of discharge? Haven't had time   Nursing Interventions Advised patient to make appointment   Has the patient kept scheduled appointments due by today? N/A   Psychosocial issues? No   Did the patient receive a copy of their discharge instructions? Yes   Nursing interventions Reviewed instructions with patient   What is the patient's perception of their health status since discharge? Improving   Is the patient/caregiver able to teach back signs and symptoms related to disease process for when to call PCP? Yes   Is the patient/caregiver able to teach back signs and symptoms related to disease process for when to call 911? Yes   Is the patient/caregiver able to teach back the hierarchy of who to call/visit for symptoms/problems? PCP, Specialist, Home health nurse, Urgent Care, ED, 911 Yes   If the patient is a current smoker, are they able to teach back resources for cessation? Not a smoker   Additional teach back comments States she is weaker than what  she was expecting   Week 1 call completed? Yes   Wrap up additional comments Will message case management regarding medications.          Estee Corey LPN

## 2022-01-19 NOTE — OUTREACH NOTE
Case Management Call Center Follow-up      Responses   [facility] Call Center Tracking Reason? Other (specify in comments)   Other Tracking Comments Transfer prescriptions   Has the Call Center Case Management Follow-up issue been resolved? No   Follow-up Comments Notes reviewed.  Call placed to [facility] and spoke with pharmacist.  She will transfer pt's prescriptions to her specified [facility].  Call placed to pt's [facility] and spoke with the pharmacist.  He states that they do have pt's Part D drug policy and it is active.  He awaits the transfer of the medications.  Call placed to pt to alert her but no answer.  Left HIPAA compliant  for call back.  CHERYL Cifuentes RN    1/19/2022, 11:00 EST

## 2022-01-19 NOTE — OUTREACH NOTE
Case Management Call Center Follow-up      Responses   [facility] Call Center Tracking Reason? Other (specify in comments)   Other Tracking Comments Transfer prescriptions   Has the Call Center Case Management Follow-up issue been resolved? Yes   Follow-up Comments Return call received from patient.  Alerted pt of medications transferred to [facility].  She will call and .  CHERYL Cifuentes RN    1/19/2022, 12:27 EST

## 2022-01-25 ENCOUNTER — READMISSION MANAGEMENT (OUTPATIENT)
Dept: CALL CENTER | Facility: HOSPITAL | Age: 77
End: 2022-01-25

## 2022-01-25 NOTE — OUTREACH NOTE
Medical Week 2 Survey      Responses   [facility] patient discharged from? [place]   Does the patient have one of the following disease processes/diagnoses(primary or secondary)? Other   Week 2 attempt successful? Yes   Call start time 1431   Discharge diagnosis Nonbleeding large duodenal ulcerendoscopy    Call end time 1434   Meds reviewed with patient/caregiver? Yes   Is the patient taking all medications as directed (includes completed medication regime)? Yes   Comments regarding appointments [facility]    Does the patient have a primary care provider?  Yes   Comments regarding PCP will call PCP this week   Has the patient kept scheduled appointments due by today? N/A   Psychosocial issues? No   Did the patient receive a copy of their discharge instructions? Yes   Nursing interventions Reviewed instructions with patient   What is the patient's perception of their health status since discharge? Improving  [very slowly]   Is the patient/caregiver able to teach back signs and symptoms related to disease process for when to call PCP? Yes   Is the patient/caregiver able to teach back signs and symptoms related to disease process for when to call 911? Yes   Is the patient/caregiver able to teach back the hierarchy of who to call/visit for symptoms/problems? PCP, Specialist, Home health nurse, Urgent Care, ED, 911 Yes   If the patient is a current smoker, are they able to teach back resources for cessation? Not a smoker   Additional teach back comments States she is trying to walk around the house some and her appetite is returning.    Week 2 Call Completed? Yes   Wrap up additional comments No needs identified.           Anamaria Huerta RN

## 2022-01-30 ENCOUNTER — HOSPITAL ENCOUNTER (INPATIENT)
Facility: HOSPITAL | Age: 77
LOS: 6 days | Discharge: HOME | End: 2022-02-05
Attending: EMERGENCY MEDICINE | Admitting: HOSPITALIST

## 2022-01-30 ENCOUNTER — APPOINTMENT (OUTPATIENT)
Dept: GENERAL RADIOLOGY | Facility: HOSPITAL | Age: 77
End: 2022-01-30

## 2022-01-30 ENCOUNTER — READMISSION MANAGEMENT (OUTPATIENT)
Dept: CALL CENTER | Facility: HOSPITAL | Age: 77
End: 2022-01-30

## 2022-01-30 DIAGNOSIS — D72.829 LEUKOCYTOSIS, UNSPECIFIED TYPE: ICD-10-CM

## 2022-01-30 DIAGNOSIS — E87.6 HYPOKALEMIA: ICD-10-CM

## 2022-01-30 DIAGNOSIS — R53.1 GENERALIZED WEAKNESS: Primary | ICD-10-CM

## 2022-01-30 LAB
ALBUMIN SERPL-MCNC: 2.9 G/DL (ref 3.5–5.2)
ALBUMIN/GLOB SERPL: 1 G/DL
ALP SERPL-CCNC: 139 U/L (ref 39–117)
ALT SERPL W P-5'-P-CCNC: 13 U/L (ref 1–33)
ANION GAP SERPL CALCULATED.3IONS-SCNC: 19.7 MMOL/L (ref 5–15)
APPEARANCE FLD: CLEAR
AST SERPL-CCNC: 15 U/L (ref 1–32)
BASOPHILS # BLD AUTO: 0.07 10*3/MM3 (ref 0–0.2)
BASOPHILS NFR BLD AUTO: 0.4 % (ref 0–1.5)
BILIRUB SERPL-MCNC: 0.7 MG/DL (ref 0–1.2)
BUN SERPL-MCNC: 28 MG/DL (ref 8–23)
BUN/CREAT SERPL: 2.9 (ref 7–25)
C DIFF TOX GENS STL QL NAA+PROBE: POSITIVE
CALCIUM SPEC-SCNC: 7.9 MG/DL (ref 8.6–10.5)
CHLORIDE SERPL-SCNC: 95 MMOL/L (ref 98–107)
CO2 SERPL-SCNC: 24.3 MMOL/L (ref 22–29)
COLOR FLD: COLORLESS
CREAT SERPL-MCNC: 9.72 MG/DL (ref 0.57–1)
D-LACTATE SERPL-SCNC: 2.1 MMOL/L (ref 0.5–2)
D-LACTATE SERPL-SCNC: 2.5 MMOL/L (ref 0.5–2)
DEPRECATED RDW RBC AUTO: 58.2 FL (ref 37–54)
EOSINOPHIL # BLD AUTO: 0.06 10*3/MM3 (ref 0–0.4)
EOSINOPHIL NFR BLD AUTO: 0.3 % (ref 0.3–6.2)
ERYTHROCYTE [DISTWIDTH] IN BLOOD BY AUTOMATED COUNT: 17.4 % (ref 12.3–15.4)
GFR SERPL CREATININE-BSD FRML MDRD: 4 ML/MIN/1.73
GFR SERPL CREATININE-BSD FRML MDRD: ABNORMAL ML/MIN/{1.73_M2}
GLOBULIN UR ELPH-MCNC: 2.9 GM/DL
GLUCOSE SERPL-MCNC: 113 MG/DL (ref 65–99)
HCT VFR BLD AUTO: 32.8 % (ref 34–46.6)
HGB BLD-MCNC: 11 G/DL (ref 12–15.9)
IMM GRANULOCYTES # BLD AUTO: 0.33 10*3/MM3 (ref 0–0.05)
IMM GRANULOCYTES NFR BLD AUTO: 1.7 % (ref 0–0.5)
INR PPP: 1.26 (ref 0.9–1.1)
LYMPHOCYTES # BLD AUTO: 0.58 10*3/MM3 (ref 0.7–3.1)
LYMPHOCYTES NFR BLD AUTO: 3 % (ref 19.6–45.3)
MAGNESIUM SERPL-MCNC: 1.5 MG/DL (ref 1.6–2.4)
MCH RBC QN AUTO: 30.8 PG (ref 26.6–33)
MCHC RBC AUTO-ENTMCNC: 33.5 G/DL (ref 31.5–35.7)
MCV RBC AUTO: 91.9 FL (ref 79–97)
METHOD: NORMAL
MONOCYTES # BLD AUTO: 0.67 10*3/MM3 (ref 0.1–0.9)
MONOCYTES NFR BLD AUTO: 3.5 % (ref 5–12)
NEUTROPHILS NFR BLD AUTO: 17.48 10*3/MM3 (ref 1.7–7)
NEUTROPHILS NFR BLD AUTO: 91.1 % (ref 42.7–76)
NRBC BLD AUTO-RTO: 0.1 /100 WBC (ref 0–0.2)
NUC CELL # FLD: 2 /MM3
PLATELET # BLD AUTO: 381 10*3/MM3 (ref 140–450)
PMV BLD AUTO: 10.4 FL (ref 6–12)
POTASSIUM SERPL-SCNC: 2.5 MMOL/L (ref 3.5–5.2)
PROCALCITONIN SERPL-MCNC: 1.91 NG/ML (ref 0–0.25)
PROT SERPL-MCNC: 5.8 G/DL (ref 6–8.5)
PROTHROMBIN TIME: 15.6 SECONDS (ref 11.7–14.2)
RBC # BLD AUTO: 3.57 10*6/MM3 (ref 3.77–5.28)
RBC # FLD AUTO: 1 /MM3
SARS-COV-2 RNA PNL SPEC NAA+PROBE: NOT DETECTED
SODIUM SERPL-SCNC: 139 MMOL/L (ref 136–145)
TROPONIN T SERPL-MCNC: 0.06 NG/ML (ref 0–0.03)
WBC NRBC COR # BLD: 19.19 10*3/MM3 (ref 3.4–10.8)

## 2022-01-30 PROCEDURE — 89050 BODY FLUID CELL COUNT: CPT | Performed by: EMERGENCY MEDICINE

## 2022-01-30 PROCEDURE — 36415 COLL VENOUS BLD VENIPUNCTURE: CPT | Performed by: EMERGENCY MEDICINE

## 2022-01-30 PROCEDURE — 84484 ASSAY OF TROPONIN QUANT: CPT | Performed by: EMERGENCY MEDICINE

## 2022-01-30 PROCEDURE — 83605 ASSAY OF LACTIC ACID: CPT | Performed by: EMERGENCY MEDICINE

## 2022-01-30 PROCEDURE — 87040 BLOOD CULTURE FOR BACTERIA: CPT | Performed by: EMERGENCY MEDICINE

## 2022-01-30 PROCEDURE — 87205 SMEAR GRAM STAIN: CPT | Performed by: EMERGENCY MEDICINE

## 2022-01-30 PROCEDURE — 71045 X-RAY EXAM CHEST 1 VIEW: CPT

## 2022-01-30 PROCEDURE — 83735 ASSAY OF MAGNESIUM: CPT | Performed by: EMERGENCY MEDICINE

## 2022-01-30 PROCEDURE — 93005 ELECTROCARDIOGRAM TRACING: CPT | Performed by: EMERGENCY MEDICINE

## 2022-01-30 PROCEDURE — 25010000002 MAGNESIUM SULFATE 2 GM/50ML SOLUTION: Performed by: EMERGENCY MEDICINE

## 2022-01-30 PROCEDURE — 80053 COMPREHEN METABOLIC PANEL: CPT | Performed by: EMERGENCY MEDICINE

## 2022-01-30 PROCEDURE — 84145 PROCALCITONIN (PCT): CPT | Performed by: EMERGENCY MEDICINE

## 2022-01-30 PROCEDURE — 85025 COMPLETE CBC W/AUTO DIFF WBC: CPT | Performed by: EMERGENCY MEDICINE

## 2022-01-30 PROCEDURE — 87635 SARS-COV-2 COVID-19 AMP PRB: CPT | Performed by: EMERGENCY MEDICINE

## 2022-01-30 PROCEDURE — 99285 EMERGENCY DEPT VISIT HI MDM: CPT

## 2022-01-30 PROCEDURE — 87070 CULTURE OTHR SPECIMN AEROBIC: CPT | Performed by: EMERGENCY MEDICINE

## 2022-01-30 PROCEDURE — 87493 C DIFF AMPLIFIED PROBE: CPT | Performed by: EMERGENCY MEDICINE

## 2022-01-30 PROCEDURE — 85610 PROTHROMBIN TIME: CPT | Performed by: EMERGENCY MEDICINE

## 2022-01-30 RX ORDER — PANTOPRAZOLE SODIUM 40 MG/1
40 TABLET, DELAYED RELEASE ORAL
Status: DISCONTINUED | OUTPATIENT
Start: 2022-01-31 | End: 2022-01-31

## 2022-01-30 RX ORDER — FAMOTIDINE 20 MG/1
20 TABLET, FILM COATED ORAL
Status: DISCONTINUED | OUTPATIENT
Start: 2022-01-31 | End: 2022-01-30

## 2022-01-30 RX ORDER — AMLODIPINE BESYLATE 5 MG/1
5 TABLET ORAL DAILY
COMMUNITY
End: 2022-02-05 | Stop reason: HOSPADM

## 2022-01-30 RX ORDER — MAGNESIUM SULFATE HEPTAHYDRATE 40 MG/ML
2 INJECTION, SOLUTION INTRAVENOUS ONCE
Status: COMPLETED | OUTPATIENT
Start: 2022-01-30 | End: 2022-01-30

## 2022-01-30 RX ORDER — ATORVASTATIN CALCIUM 20 MG/1
20 TABLET, FILM COATED ORAL DAILY
COMMUNITY
End: 2022-02-05 | Stop reason: HOSPADM

## 2022-01-30 RX ORDER — SUCRALFATE 1 G/1
1 TABLET ORAL
Status: DISCONTINUED | OUTPATIENT
Start: 2022-01-30 | End: 2022-02-05 | Stop reason: HOSPADM

## 2022-01-30 RX ORDER — MELATONIN
2000 DAILY
Status: DISCONTINUED | OUTPATIENT
Start: 2022-01-31 | End: 2022-01-31

## 2022-01-30 RX ORDER — CINACALCET 30 MG/1
1 TABLET, FILM COATED ORAL DAILY
COMMUNITY
Start: 2022-01-21 | End: 2022-02-05 | Stop reason: HOSPADM

## 2022-01-30 RX ORDER — POTASSIUM CHLORIDE 750 MG/1
40 TABLET, FILM COATED, EXTENDED RELEASE ORAL ONCE
Status: COMPLETED | OUTPATIENT
Start: 2022-01-30 | End: 2022-01-30

## 2022-01-30 RX ORDER — ATORVASTATIN CALCIUM 20 MG/1
20 TABLET, FILM COATED ORAL NIGHTLY
Status: DISCONTINUED | OUTPATIENT
Start: 2022-01-30 | End: 2022-01-31

## 2022-01-30 RX ORDER — CALCITRIOL 0.25 UG/1
0.25 CAPSULE, LIQUID FILLED ORAL DAILY
Status: DISCONTINUED | OUTPATIENT
Start: 2022-01-31 | End: 2022-02-05 | Stop reason: HOSPADM

## 2022-01-30 RX ORDER — AMLODIPINE BESYLATE 5 MG/1
5 TABLET ORAL
Status: DISCONTINUED | OUTPATIENT
Start: 2022-01-31 | End: 2022-01-30

## 2022-01-30 RX ORDER — SEVELAMER CARBONATE 800 MG/1
800 TABLET, FILM COATED ORAL
Status: DISCONTINUED | OUTPATIENT
Start: 2022-01-31 | End: 2022-02-05 | Stop reason: HOSPADM

## 2022-01-30 RX ORDER — LEVOTHYROXINE SODIUM 0.03 MG/1
25 TABLET ORAL
Status: DISCONTINUED | OUTPATIENT
Start: 2022-01-31 | End: 2022-02-05 | Stop reason: HOSPADM

## 2022-01-30 RX ORDER — VANCOMYCIN HYDROCHLORIDE 125 MG/1
125 CAPSULE ORAL EVERY 6 HOURS SCHEDULED
Status: DISCONTINUED | OUTPATIENT
Start: 2022-01-31 | End: 2022-02-05 | Stop reason: HOSPADM

## 2022-01-30 RX ORDER — POTASSIUM CHLORIDE 750 MG/1
80 TABLET, EXTENDED RELEASE ORAL ONCE
Status: DISCONTINUED | OUTPATIENT
Start: 2022-01-30 | End: 2022-01-30

## 2022-01-30 RX ORDER — VANCOMYCIN HYDROCHLORIDE 1 G/200ML
20 INJECTION, SOLUTION INTRAVENOUS ONCE
Status: DISCONTINUED | OUTPATIENT
Start: 2022-01-30 | End: 2022-01-30

## 2022-01-30 RX ADMIN — MAGNESIUM SULFATE HEPTAHYDRATE 2 G: 40 INJECTION, SOLUTION INTRAVENOUS at 15:29

## 2022-01-30 RX ADMIN — SODIUM CHLORIDE 500 ML: 9 INJECTION, SOLUTION INTRAVENOUS at 15:11

## 2022-01-30 RX ADMIN — POTASSIUM CHLORIDE 40 MEQ: 750 TABLET, EXTENDED RELEASE ORAL at 14:40

## 2022-01-30 RX ADMIN — POTASSIUM CHLORIDE 40 MEQ: 10 TABLET, EXTENDED RELEASE ORAL at 17:04

## 2022-01-31 LAB
ALBUMIN SERPL-MCNC: 2.3 G/DL (ref 3.5–5.2)
ANION GAP SERPL CALCULATED.3IONS-SCNC: 14 MMOL/L (ref 5–15)
BASOPHILS # BLD AUTO: 0.08 10*3/MM3 (ref 0–0.2)
BASOPHILS NFR BLD AUTO: 0.4 % (ref 0–1.5)
BUN SERPL-MCNC: 37 MG/DL (ref 8–23)
BUN/CREAT SERPL: 3.6 (ref 7–25)
CALCIUM SPEC-SCNC: 7.8 MG/DL (ref 8.6–10.5)
CHLORIDE SERPL-SCNC: 99 MMOL/L (ref 98–107)
CO2 SERPL-SCNC: 21 MMOL/L (ref 22–29)
CREAT SERPL-MCNC: 10.23 MG/DL (ref 0.57–1)
DEPRECATED RDW RBC AUTO: 54.3 FL (ref 37–54)
EOSINOPHIL # BLD AUTO: 0.16 10*3/MM3 (ref 0–0.4)
EOSINOPHIL NFR BLD AUTO: 0.8 % (ref 0.3–6.2)
ERYTHROCYTE [DISTWIDTH] IN BLOOD BY AUTOMATED COUNT: 17.3 % (ref 12.3–15.4)
GFR SERPL CREATININE-BSD FRML MDRD: 4 ML/MIN/1.73
GFR SERPL CREATININE-BSD FRML MDRD: ABNORMAL ML/MIN/{1.73_M2}
GLUCOSE SERPL-MCNC: 98 MG/DL (ref 65–99)
HCT VFR BLD AUTO: 26.8 % (ref 34–46.6)
HGB BLD-MCNC: 9.6 G/DL (ref 12–15.9)
IMM GRANULOCYTES # BLD AUTO: 0.41 10*3/MM3 (ref 0–0.05)
IMM GRANULOCYTES NFR BLD AUTO: 2.1 % (ref 0–0.5)
LYMPHOCYTES # BLD AUTO: 0.72 10*3/MM3 (ref 0.7–3.1)
LYMPHOCYTES NFR BLD AUTO: 3.7 % (ref 19.6–45.3)
MAGNESIUM SERPL-MCNC: 2.7 MG/DL (ref 1.6–2.4)
MCH RBC QN AUTO: 31.2 PG (ref 26.6–33)
MCHC RBC AUTO-ENTMCNC: 35.8 G/DL (ref 31.5–35.7)
MCV RBC AUTO: 87 FL (ref 79–97)
MONOCYTES # BLD AUTO: 0.65 10*3/MM3 (ref 0.1–0.9)
MONOCYTES NFR BLD AUTO: 3.4 % (ref 5–12)
NEUTROPHILS NFR BLD AUTO: 17.37 10*3/MM3 (ref 1.7–7)
NEUTROPHILS NFR BLD AUTO: 89.6 % (ref 42.7–76)
NRBC BLD AUTO-RTO: 0.1 /100 WBC (ref 0–0.2)
PHOSPHATE SERPL-MCNC: 4.5 MG/DL (ref 2.5–4.5)
PLATELET # BLD AUTO: 343 10*3/MM3 (ref 140–450)
PMV BLD AUTO: 10.5 FL (ref 6–12)
POTASSIUM SERPL-SCNC: 3.2 MMOL/L (ref 3.5–5.2)
RBC # BLD AUTO: 3.08 10*6/MM3 (ref 3.77–5.28)
SODIUM SERPL-SCNC: 134 MMOL/L (ref 136–145)
WBC NRBC COR # BLD: 19.39 10*3/MM3 (ref 3.4–10.8)

## 2022-01-31 PROCEDURE — 83735 ASSAY OF MAGNESIUM: CPT | Performed by: INTERNAL MEDICINE

## 2022-01-31 PROCEDURE — 80069 RENAL FUNCTION PANEL: CPT | Performed by: INTERNAL MEDICINE

## 2022-01-31 PROCEDURE — 5A1D70Z PERFORMANCE OF URINARY FILTRATION, INTERMITTENT, LESS THAN 6 HOURS PER DAY: ICD-10-PCS | Performed by: INTERNAL MEDICINE

## 2022-01-31 PROCEDURE — 85025 COMPLETE CBC W/AUTO DIFF WBC: CPT | Performed by: INTERNAL MEDICINE

## 2022-01-31 PROCEDURE — 25010000002 MAGNESIUM SULFATE IN D5W 1G/100ML (PREMIX) 1-5 GM/100ML-% SOLUTION: Performed by: INTERNAL MEDICINE

## 2022-01-31 RX ORDER — POTASSIUM CHLORIDE 750 MG/1
40 TABLET, FILM COATED, EXTENDED RELEASE ORAL ONCE
Status: DISCONTINUED | OUTPATIENT
Start: 2022-01-31 | End: 2022-01-31

## 2022-01-31 RX ORDER — FAMOTIDINE 20 MG/1
20 TABLET, FILM COATED ORAL 2 TIMES DAILY
Status: DISCONTINUED | OUTPATIENT
Start: 2022-01-31 | End: 2022-02-05 | Stop reason: HOSPADM

## 2022-01-31 RX ORDER — MAGNESIUM SULFATE HEPTAHYDRATE 40 MG/ML
2 INJECTION, SOLUTION INTRAVENOUS ONCE
Status: DISCONTINUED | OUTPATIENT
Start: 2022-01-31 | End: 2022-01-31

## 2022-01-31 RX ORDER — MELATONIN
1000 DAILY
Status: DISCONTINUED | OUTPATIENT
Start: 2022-02-01 | End: 2022-02-05 | Stop reason: HOSPADM

## 2022-01-31 RX ORDER — POTASSIUM CHLORIDE 750 MG/1
40 TABLET, FILM COATED, EXTENDED RELEASE ORAL ONCE
Status: COMPLETED | OUTPATIENT
Start: 2022-01-31 | End: 2022-01-31

## 2022-01-31 RX ORDER — ATORVASTATIN CALCIUM 20 MG/1
10 TABLET, FILM COATED ORAL NIGHTLY
Status: DISCONTINUED | OUTPATIENT
Start: 2022-01-31 | End: 2022-02-03

## 2022-01-31 RX ORDER — PANTOPRAZOLE SODIUM 40 MG/1
40 TABLET, DELAYED RELEASE ORAL
Status: DISCONTINUED | OUTPATIENT
Start: 2022-01-31 | End: 2022-01-31

## 2022-01-31 RX ORDER — MAGNESIUM SULFATE 1 G/100ML
1 INJECTION INTRAVENOUS
Status: DISCONTINUED | OUTPATIENT
Start: 2022-01-31 | End: 2022-01-31

## 2022-01-31 RX ORDER — FAMOTIDINE 20 MG/1
20 TABLET, FILM COATED ORAL DAILY
Status: DISCONTINUED | OUTPATIENT
Start: 2022-01-31 | End: 2022-01-31

## 2022-01-31 RX ADMIN — MAGNESIUM SULFATE 1 G: 1 INJECTION INTRAVENOUS at 13:52

## 2022-01-31 RX ADMIN — SEVELAMER CARBONATE 800 MG: 800 TABLET, FILM COATED ORAL at 09:12

## 2022-01-31 RX ADMIN — METRONIDAZOLE 500 MG: 500 INJECTION, SOLUTION INTRAVENOUS at 14:57

## 2022-01-31 RX ADMIN — SUCRALFATE 1 G: 1 TABLET ORAL at 21:43

## 2022-01-31 RX ADMIN — SUCRALFATE 1 G: 1 TABLET ORAL at 06:59

## 2022-01-31 RX ADMIN — VANCOMYCIN HYDROCHLORIDE 125 MG: 125 CAPSULE ORAL at 18:38

## 2022-01-31 RX ADMIN — VANCOMYCIN HYDROCHLORIDE 125 MG: 125 CAPSULE ORAL at 11:31

## 2022-01-31 RX ADMIN — VANCOMYCIN HYDROCHLORIDE 125 MG: 125 CAPSULE ORAL at 06:07

## 2022-01-31 RX ADMIN — SEVELAMER CARBONATE 800 MG: 800 TABLET, FILM COATED ORAL at 11:31

## 2022-01-31 RX ADMIN — POTASSIUM CHLORIDE 40 MEQ: 10 TABLET, EXTENDED RELEASE ORAL at 14:56

## 2022-01-31 RX ADMIN — SUCRALFATE 1 G: 1 TABLET ORAL at 00:19

## 2022-01-31 RX ADMIN — ATORVASTATIN CALCIUM 20 MG: 20 TABLET, FILM COATED ORAL at 00:19

## 2022-01-31 RX ADMIN — SUCRALFATE 1 G: 1 TABLET ORAL at 11:31

## 2022-01-31 RX ADMIN — PANTOPRAZOLE SODIUM 40 MG: 40 TABLET, DELAYED RELEASE ORAL at 06:59

## 2022-01-31 RX ADMIN — ATORVASTATIN CALCIUM 10 MG: 20 TABLET, FILM COATED ORAL at 21:43

## 2022-01-31 RX ADMIN — VANCOMYCIN HYDROCHLORIDE 125 MG: 125 CAPSULE ORAL at 23:58

## 2022-01-31 RX ADMIN — SUCRALFATE 1 G: 1 TABLET ORAL at 18:52

## 2022-01-31 RX ADMIN — LEVOTHYROXINE SODIUM 25 MCG: 0.03 TABLET ORAL at 06:07

## 2022-01-31 RX ADMIN — METRONIDAZOLE 500 MG: 500 INJECTION, SOLUTION INTRAVENOUS at 23:17

## 2022-01-31 RX ADMIN — Medication 2000 UNITS: at 09:13

## 2022-01-31 RX ADMIN — METRONIDAZOLE 500 MG: 500 INJECTION, SOLUTION INTRAVENOUS at 06:07

## 2022-01-31 RX ADMIN — FAMOTIDINE 20 MG: 20 TABLET, FILM COATED ORAL at 21:43

## 2022-01-31 RX ADMIN — VANCOMYCIN HYDROCHLORIDE 125 MG: 125 CAPSULE ORAL at 01:04

## 2022-01-31 RX ADMIN — CALCITRIOL 0.25 MCG: 0.25 CAPSULE ORAL at 09:12

## 2022-01-31 RX ADMIN — MAGNESIUM SULFATE 1 G: 1 INJECTION INTRAVENOUS at 11:31

## 2022-01-31 NOTE — OUTREACH NOTE
Medical Week 3 Survey      Responses   [facility] patient discharged from? [place]   Does the patient have one of the following disease processes/diagnoses(primary or secondary)? Other   Week 3 attempt successful? No   Revoke Readmitted          Shakira Souza RN

## 2022-02-01 LAB
ALBUMIN SERPL-MCNC: 1.7 G/DL (ref 3.5–5.2)
ANION GAP SERPL CALCULATED.3IONS-SCNC: 14.4 MMOL/L (ref 5–15)
BASOPHILS # BLD AUTO: 0.05 10*3/MM3 (ref 0–0.2)
BASOPHILS NFR BLD AUTO: 0.3 % (ref 0–1.5)
BUN SERPL-MCNC: 37 MG/DL (ref 8–23)
BUN/CREAT SERPL: 3.9 (ref 7–25)
CALCIUM SPEC-SCNC: 8.1 MG/DL (ref 8.6–10.5)
CHLORIDE SERPL-SCNC: 99 MMOL/L (ref 98–107)
CO2 SERPL-SCNC: 21.6 MMOL/L (ref 22–29)
CREAT SERPL-MCNC: 9.44 MG/DL (ref 0.57–1)
DEPRECATED RDW RBC AUTO: 61.1 FL (ref 37–54)
EOSINOPHIL # BLD AUTO: 0.24 10*3/MM3 (ref 0–0.4)
EOSINOPHIL NFR BLD AUTO: 1.6 % (ref 0.3–6.2)
ERYTHROCYTE [DISTWIDTH] IN BLOOD BY AUTOMATED COUNT: 17.8 % (ref 12.3–15.4)
GFR SERPL CREATININE-BSD FRML MDRD: 4 ML/MIN/1.73
GFR SERPL CREATININE-BSD FRML MDRD: ABNORMAL ML/MIN/{1.73_M2}
GLUCOSE SERPL-MCNC: 110 MG/DL (ref 65–99)
HCT VFR BLD AUTO: 26.8 % (ref 34–46.6)
HGB BLD-MCNC: 8.9 G/DL (ref 12–15.9)
IMM GRANULOCYTES # BLD AUTO: 0.2 10*3/MM3 (ref 0–0.05)
IMM GRANULOCYTES NFR BLD AUTO: 1.3 % (ref 0–0.5)
LYMPHOCYTES # BLD AUTO: 0.87 10*3/MM3 (ref 0.7–3.1)
LYMPHOCYTES NFR BLD AUTO: 5.8 % (ref 19.6–45.3)
MAGNESIUM SERPL-MCNC: 2.9 MG/DL (ref 1.6–2.4)
MCH RBC QN AUTO: 30.8 PG (ref 26.6–33)
MCHC RBC AUTO-ENTMCNC: 33.2 G/DL (ref 31.5–35.7)
MCV RBC AUTO: 92.7 FL (ref 79–97)
MONOCYTES # BLD AUTO: 0.59 10*3/MM3 (ref 0.1–0.9)
MONOCYTES NFR BLD AUTO: 3.9 % (ref 5–12)
NEUTROPHILS NFR BLD AUTO: 13.08 10*3/MM3 (ref 1.7–7)
NEUTROPHILS NFR BLD AUTO: 87.1 % (ref 42.7–76)
NRBC BLD AUTO-RTO: 0.1 /100 WBC (ref 0–0.2)
PHOSPHATE SERPL-MCNC: 3.5 MG/DL (ref 2.5–4.5)
PLATELET # BLD AUTO: 325 10*3/MM3 (ref 140–450)
PMV BLD AUTO: 10.2 FL (ref 6–12)
POTASSIUM SERPL-SCNC: 3.3 MMOL/L (ref 3.5–5.2)
RBC # BLD AUTO: 2.89 10*6/MM3 (ref 3.77–5.28)
SODIUM SERPL-SCNC: 135 MMOL/L (ref 136–145)
WBC NRBC COR # BLD: 15.03 10*3/MM3 (ref 3.4–10.8)

## 2022-02-01 PROCEDURE — 83735 ASSAY OF MAGNESIUM: CPT | Performed by: INTERNAL MEDICINE

## 2022-02-01 PROCEDURE — 80069 RENAL FUNCTION PANEL: CPT | Performed by: INTERNAL MEDICINE

## 2022-02-01 PROCEDURE — 85025 COMPLETE CBC W/AUTO DIFF WBC: CPT | Performed by: INTERNAL MEDICINE

## 2022-02-01 RX ORDER — POTASSIUM CHLORIDE 750 MG/1
40 TABLET, FILM COATED, EXTENDED RELEASE ORAL ONCE
Status: COMPLETED | OUTPATIENT
Start: 2022-02-01 | End: 2022-02-01

## 2022-02-01 RX ADMIN — ATORVASTATIN CALCIUM 10 MG: 20 TABLET, FILM COATED ORAL at 22:48

## 2022-02-01 RX ADMIN — Medication 1000 UNITS: at 08:57

## 2022-02-01 RX ADMIN — POTASSIUM CHLORIDE 40 MEQ: 10 TABLET, EXTENDED RELEASE ORAL at 08:56

## 2022-02-01 RX ADMIN — METRONIDAZOLE 500 MG: 500 INJECTION, SOLUTION INTRAVENOUS at 11:00

## 2022-02-01 RX ADMIN — SUCRALFATE 1 G: 1 TABLET ORAL at 22:49

## 2022-02-01 RX ADMIN — SEVELAMER CARBONATE 800 MG: 800 TABLET, FILM COATED ORAL at 08:57

## 2022-02-01 RX ADMIN — METRONIDAZOLE 500 MG: 500 INJECTION, SOLUTION INTRAVENOUS at 14:43

## 2022-02-01 RX ADMIN — VANCOMYCIN HYDROCHLORIDE 125 MG: 125 CAPSULE ORAL at 11:53

## 2022-02-01 RX ADMIN — SEVELAMER CARBONATE 800 MG: 800 TABLET, FILM COATED ORAL at 11:53

## 2022-02-01 RX ADMIN — CALCITRIOL 0.25 MCG: 0.25 CAPSULE ORAL at 08:57

## 2022-02-01 RX ADMIN — FAMOTIDINE 20 MG: 20 TABLET, FILM COATED ORAL at 08:57

## 2022-02-01 RX ADMIN — VANCOMYCIN HYDROCHLORIDE 125 MG: 125 CAPSULE ORAL at 18:13

## 2022-02-01 RX ADMIN — SUCRALFATE 1 G: 1 TABLET ORAL at 18:13

## 2022-02-01 RX ADMIN — VANCOMYCIN HYDROCHLORIDE 125 MG: 125 CAPSULE ORAL at 06:13

## 2022-02-01 RX ADMIN — SEVELAMER CARBONATE 800 MG: 800 TABLET, FILM COATED ORAL at 18:13

## 2022-02-01 RX ADMIN — SUCRALFATE 1 G: 1 TABLET ORAL at 10:45

## 2022-02-01 RX ADMIN — LEVOTHYROXINE SODIUM 25 MCG: 0.03 TABLET ORAL at 06:13

## 2022-02-01 RX ADMIN — SUCRALFATE 1 G: 1 TABLET ORAL at 07:03

## 2022-02-01 NOTE — PROGRESS NOTES
[facility] Progress Note      Patient Name: Fany Todd  : 1945  MRN: [id number]  Primary Care Physician:  Lilibeth Cota MD  Date of admission: 2022    Subjective     Interval History:   Follow-up end-stage renal disease on peritoneal dialysis    The patient is feeling somewhat better today her diarrhea has improved, no chest pain, no shortness of air, no orthopnea or PND, no abdominal pain, no nausea or vomiting, her peritoneal dialysate is clear.  Tolerating her CAPD without any difficulty    Review of Systems:   As noted above    Objective     Vitals:   Temp:  [96.5 °F (35.8 °C)-98.7 °F (37.1 °C)] 98.7 °F (37.1 °C)  Heart Rate:  [84-87] 84  Resp:  [15-16] 16  BP: ()/(49-68) 92/54    Intake/Output Summary (Last 24 hours) at 2022 0843  Last data filed at 2022 0702  Gross per 24 hour   Intake 7020 ml   Output 4800 ml   Net 2220 ml       Physical Exam:    General Appearance: alert, oriented x 3, no acute distress, appears to be chronically ill and frail  Skin: warm and dry  HEENT: oral mucosa normal, nonicteric sclera  Neck: supple, no JVD  Lungs: CTA, unlabored breathing effort  Heart: RRR, normal S1 and S2, no S3, no rub  Abdomen: soft, nontender, nondistended, normoactive bowels, PD catheter in place exit site is clean and no tunnel tenderness  : no palpable bladder  Extremities: no edema, cyanosis or clubbing  Neuro: normal speech and mental status     Scheduled Meds:     atorvastatin, 10 mg, Oral, Nightly  calcitriol, 0.25 mcg, Oral, Daily  cholecalciferol, 1,000 Units, Oral, Daily  famotidine, 20 mg, Oral, BID  levothyroxine, 25 mcg, Oral, Q AM  metroNIDAZOLE, 500 mg, Intravenous, Q8H  sevelamer, 800 mg, Oral, TID With Meals  sucralfate, 1 g, Oral, 4x Daily AC & at Bedtime  vancomycin, 125 mg, Oral, Q6H      IV Meds:        Results Reviewed:   I have personally reviewed the results from the time of this admission to 2022 08:43 EST      Results from last 7 days   Lab Units 02/01/22  0606 01/31/22  1303 01/30/22  1302   SODIUM mmol/L 135* 134* 139   POTASSIUM mmol/L 3.3* 3.2* 2.5*   CHLORIDE mmol/L 99 99 95*   CO2 mmol/L 21.6* 21.0* 24.3   BUN mg/dL 37* 37* 28*   CREATININE mg/dL 9.44* 10.23* 9.72*   CALCIUM mg/dL 8.1* 7.8* 7.9*   BILIRUBIN mg/dL  --   --  0.7   ALK PHOS U/L  --   --  139*   ALT (SGPT) U/L  --   --  13   AST (SGOT) U/L  --   --  15   GLUCOSE mg/dL 110* 98 113*       Estimated Creatinine Clearance: 3.7 mL/min (A) (by C-G formula based on SCr of 9.44 mg/dL (H)).    Results from last 7 days   Lab Units 02/01/22  0606 01/31/22  1303 01/30/22  1302   MAGNESIUM mg/dL 2.9* 2.7* 1.5*   PHOSPHORUS mg/dL 3.5 4.5  --              Results from last 7 days   Lab Units 02/01/22  0606 01/31/22  1303 01/30/22  1302   WBC 10*3/mm3 15.03* 19.39* 19.19*   HEMOGLOBIN g/dL 8.9* 9.6* 11.0*   PLATELETS 10*3/mm3 325 343 381       Results from last 7 days   Lab Units 01/30/22  1302   INR  1.26*       Assessment / Plan     ASSESSMENT:  1.  End-stage renal disease on peritoneal dialysis the etiology is progressive renal atrophy, the patient is tolerating her peritoneal dialysis without any problems, her PD fluid were clear no significant cell count noted, currently on CAPD 2 L exchanges 1.5% solution.  2.  C. difficile colitis, treated with oral vancomycin  3.  Hypokalemia related to severe diarrhea, potassium this morning 3.3 and magnesium two-point   4.  Anemia of chronic kidney disease, hemoglobin is 8.9, meeting goal  5.  Recent duodenal ulcer which was bleeding and severe anemia requiring multiple transfusion.   6.  Secondary hyperparathyroidism metabolic bone disease      PLAN:  1.  Continue the same treatment, continue CAPD  2.  Replete potassium  3.  Surveillance labs    I discussed the case with the patient and she voiced good understanding    Thank you for involving us in the care of Fany Todd.  Please feel free to call with any  questions.    Carlos Schwab MD  02/01/22  08:43 EST    [facility]  [phone]      Much of this encounter note is an electronic transcription/translation of spoken language to printed text. The electronic translation of spoken language may permit erroneous, or at times, nonsensical words or phrases to be inadvertently transcribed; Although I have reviewed the note for such errors, some may still exist.

## 2022-02-01 NOTE — SIGNIFICANT NOTE
02/01/22 1459   OTHER   Discipline occupational therapist   Rehab Time/Intention   Session Not Performed patient unavailable for evaluation  (OT checked on pt, pt receiving peritoneal dialysis this date. OT to f/u tomorrow.)   Recommendation   OT - Next Appointment 02/02/22

## 2022-02-01 NOTE — PROGRESS NOTES
"  Infectious Diseases Progress Note    Jason Tanner MD     [facility]  [place]: 1 day  Patient Identification:  Name: Fany Todd  Age: 76 y.o.  Sex: female  :  1945  MRN: [id number]         Primary Care Physician: Lilibeth Cota MD            Subjective: Feeling somewhat better.  Still have loose stool denies any fever and chills.  Interval History: See consultation note.    Objective:    Scheduled Meds:atorvastatin, 10 mg, Oral, Nightly  calcitriol, 0.25 mcg, Oral, Daily  [START ON 2022] cholecalciferol, 1,000 Units, Oral, Daily  famotidine, 20 mg, Oral, BID  levothyroxine, 25 mcg, Oral, Q AM  metroNIDAZOLE, 500 mg, Intravenous, Q8H  sevelamer, 800 mg, Oral, TID With Meals  sucralfate, 1 g, Oral, 4x Daily AC & at Bedtime  vancomycin, 125 mg, Oral, Q6H      Continuous Infusions:     Vital signs in last 24 hours:  Temp:  [96.5 °F (35.8 °C)-98.6 °F (37 °C)] 98.6 °F (37 °C)  Heart Rate:  [84-90] 87  Resp:  [15-16] 15  BP: ()/(52-68) 100/68    Intake/Output:    Intake/Output Summary (Last 24 hours) at 2022 2243  Last data filed at 2022 2142  Gross per 24 hour   Intake 2780 ml   Output 500 ml   Net 2280 ml       Exam:  /68 (BP Location: Right arm, Patient Position: Lying)   Pulse 87   Temp 98.6 °F (37 °C) (Oral)   Resp 15   Ht 160 cm (63\")   Wt 46.1 kg (101 lb 11.2 oz)   SpO2 99%   BMI 18.02 kg/m²   Patient is examined using the personal protective equipment as per guidelines from infection control for this particular patient as enacted.  Hand washing was performed before and after patient interaction.  General Appearance:    Alert, cooperative, no distress, AAOx3                          Head:    Normocephalic, without obvious abnormality, atraumatic                           Eyes:    PERRL, conjunctivae/corneas clear, EOM's intact, both eyes                         Throat:   Lips, tongue, gums normal; oral mucosa pink and moist                           " Neck:   Supple, symmetrical, trachea midline, no JVD                         Lungs:    Clear to auscultation bilaterally, respirations unlabored                 Chest Wall:    No tenderness or deformity                          Heart:    Regular rate and rhythm, S1 and S2 normal                  Abdomen:   Soft no guarding rigidity or rebound noted generalized tenderness.  PD catheter in place with clear dialysate fluid.                 Extremities:   Extremities normal, atraumatic, no cyanosis or edema                        Pulses:   Pulses palpable in all extremities                            Skin:   Skin is warm and dry,  no rashes or palpable lesions                  Neurologic: Grossly nonfocal.       Data Review:    I reviewed the patient's new clinical results.  Results from last 7 days   Lab Units 01/31/22  1303 01/30/22  1302   WBC 10*3/mm3 19.39* 19.19*   HEMOGLOBIN g/dL 9.6* 11.0*   PLATELETS 10*3/mm3 343 381     Results from last 7 days   Lab Units 01/31/22  1303 01/30/22  1302   SODIUM mmol/L 134* 139   POTASSIUM mmol/L 3.2* 2.5*   CHLORIDE mmol/L 99 95*   CO2 mmol/L 21.0* 24.3   BUN mg/dL 37* 28*   CREATININE mg/dL 10.23* 9.72*   CALCIUM mg/dL 7.8* 7.9*   GLUCOSE mg/dL 98 113*     Microbiology Results (last 10 days)     Procedure Component Value - Date/Time    Clostridium Difficile Toxin - Stool, Per Rectum [[id number]]  (Abnormal) Collected: 01/30/22 1838    Lab Status: Final result Specimen: Stool from Per Rectum Updated: 01/30/22 2232    Narrative:      The following orders were created for panel order Clostridium Difficile Toxin - Stool, Per Rectum.  Procedure                               Abnormality         Status                     ---------                               -----------         ------                     Clostridium Difficile To...[[id number]]  Abnormal            Final result                 Please view results for these tests on the individual orders.    Clostridium Difficile  Toxin, PCR - Stool, Per Rectum [[id number]]  (Abnormal) Collected: 01/30/22 1838    Lab Status: Final result Specimen: Stool from Per Rectum Updated: 01/30/22 2232     C. Difficile Toxins by PCR Positive    COVID PRE-OP / PRE-PROCEDURE SCREENING ORDER (NO ISOLATION) - Swab, Nasopharynx [[id number]]  (Normal) Collected: 01/30/22 1527    Lab Status: Final result Specimen: Swab from Nasopharynx Updated: 01/30/22 1612    Narrative:      The following orders were created for panel order COVID PRE-OP / PRE-PROCEDURE SCREENING ORDER (NO ISOLATION) - Swab, Nasopharynx.  Procedure                               Abnormality         Status                     ---------                               -----------         ------                     COVID-19,[facility] IN-HOUSE...[[id number]]  Normal              Final result                 Please view results for these tests on the individual orders.    COVID-19,[facility] IN-HOUSE CEPHEID/JAM NP SWAB IN TRANSPORT MEDIA 8-12 HR TAT - Swab, Nasopharynx [[id number]]  (Normal) Collected: 01/30/22 1527    Lab Status: Final result Specimen: Swab from Nasopharynx Updated: 01/30/22 1612     COVID19 Not Detected    Narrative:      Fact sheet for providers: https://www.fda.gov/media/332635/download    Fact sheet for patients: https://www.fda.gov/media/506978/download    Test performed by PCR.    Body Fluid Culture - Body Fluid, Peritoneum [[id number]] Collected: 01/30/22 1526    Lab Status: Preliminary result Specimen: Body Fluid from Peritoneum Updated: 01/31/22 0640     Body Fluid Culture No growth     Gram Stain No WBCs or organisms seen    Blood Culture - Blood, Arm, Left [[id number]]  (Normal) Collected: 01/30/22 1407    Lab Status: Preliminary result Specimen: Blood from Arm, Left Updated: 01/31/22 1415     Blood Culture No growth at 24 hours    Blood Culture - Blood, Arm, Right [[id number]]  (Normal) Collected: 01/30/22 1407    Lab Status: Preliminary result Specimen: Blood from Arm, Right  Updated: 01/31/22 1415     Blood Culture No growth at 24 hours            Assessment:    Generalized weakness  1-systemic sepsis with associated volume depletion in the setting of diarrhea recent hospitalization and recent GI bleed underlying etiology for sepsis could include:              -C. difficile colitis exacerbated by recent use of proton pump inhibitors and prior history of antibiotic use              -Intra-abdominal process with potential perforation of duodenum and evolving intra-abdominal abscesses the patient does not have any specific symptoms and initial PD fluid analysis argues against peritoneal process              -Evolving bacteremic sepsis of unclear etiology  2-recent GI bleed due to duodenal ulcer  3-end-stage renal disease on peritoneal dialysis  4-other diagnosis per primary team.     Recommendations/Discussions:  See my discussion and recommendations on 1/30/2020.  Continue with IV Flagyl and oral vancomycin.  Once her leukocytosis improved and she is able to eat without any interruption and reliably need her oral intake then IV Flagyl can be discontinued.  Low threshold to use vancomycin enema if she shows evidence of clinical or radiological illness.  Jason Tanner MD  1/31/2022  22:43 EST    Much of this encounter note is an electronic transcription/translation of spoken language to printed text. The electronic translation of spoken language may permit erroneous, or at times, nonsensical words or phrases to be inadvertently transcribed; Although I have reviewed the note for such errors, some may still exist

## 2022-02-01 NOTE — PROGRESS NOTES
"  Infectious Diseases Progress Note    Jason Tanner MD     [facility]  [place]: 2 days  Patient Identification:  Name: Fany Todd  Age: 76 y.o.  Sex: female  :  1945  MRN: [id number]         Primary Care Physician: Lilibeth Cota MD            Subjective: Feeling somewhat better but afraid that she may be discharged sooner than what she thinks and afraid to come back.  Interval History: See consultation note.    Objective:    Scheduled Meds:atorvastatin, 10 mg, Oral, Nightly  calcitriol, 0.25 mcg, Oral, Daily  cholecalciferol, 1,000 Units, Oral, Daily  famotidine, 20 mg, Oral, BID  levothyroxine, 25 mcg, Oral, Q AM  metroNIDAZOLE, 500 mg, Intravenous, Q8H  sevelamer, 800 mg, Oral, TID With Meals  sucralfate, 1 g, Oral, 4x Daily AC & at Bedtime  vancomycin, 125 mg, Oral, Q6H      Continuous Infusions:     Vital signs in last 24 hours:  Temp:  [96.5 °F (35.8 °C)-98.7 °F (37.1 °C)] 98.7 °F (37.1 °C)  Heart Rate:  [84-87] 84  Resp:  [15-16] 16  BP: ()/(49-68) 92/54    Intake/Output:    Intake/Output Summary (Last 24 hours) at 2022 1227  Last data filed at 2022 1021  Gross per 24 hour   Intake 8780 ml   Output 7100 ml   Net 1680 ml       Exam:  BP 92/54 (BP Location: Right arm, Patient Position: Lying)   Pulse 84   Temp 98.7 °F (37.1 °C) (Oral)   Resp 16   Ht 160 cm (63\")   Wt 46.1 kg (101 lb 11.2 oz)   SpO2 96%   BMI 18.02 kg/m²   Patient is examined using the personal protective equipment as per guidelines from infection control for this particular patient as enacted.  Hand washing was performed before and after patient interaction.  General Appearance:    Alert, cooperative, no distress, AAOx3                          Head:    Normocephalic, without obvious abnormality, atraumatic                           Eyes:    PERRL, conjunctivae/corneas clear, EOM's intact, both eyes                         Throat:   Lips, tongue, gums normal; oral mucosa pink and moist            "                Neck:   Supple, symmetrical, trachea midline, no JVD                         Lungs:    Clear to auscultation bilaterally, respirations unlabored                 Chest Wall:    No tenderness or deformity                          Heart:    Regular rate and rhythm, S1 and S2 normal                  Abdomen:   Soft no guarding rigidity or rebound noted generalized tenderness.  PD catheter in place with clear dialysate fluid.                 Extremities:   Extremities normal, atraumatic, no cyanosis or edema                        Pulses:   Pulses palpable in all extremities                            Skin:   Skin is warm and dry,  no rashes or palpable lesions                  Neurologic: Grossly nonfocal.       Data Review:    I reviewed the patient's new clinical results.  Results from last 7 days   Lab Units 02/01/22  0606 01/31/22  1303 01/30/22  1302   WBC 10*3/mm3 15.03* 19.39* 19.19*   HEMOGLOBIN g/dL 8.9* 9.6* 11.0*   PLATELETS 10*3/mm3 325 343 381     Results from last 7 days   Lab Units 02/01/22  0606 01/31/22  1303 01/30/22  1302   SODIUM mmol/L 135* 134* 139   POTASSIUM mmol/L 3.3* 3.2* 2.5*   CHLORIDE mmol/L 99 99 95*   CO2 mmol/L 21.6* 21.0* 24.3   BUN mg/dL 37* 37* 28*   CREATININE mg/dL 9.44* 10.23* 9.72*   CALCIUM mg/dL 8.1* 7.8* 7.9*   GLUCOSE mg/dL 110* 98 113*     Microbiology Results (last 10 days)     Procedure Component Value - Date/Time    Clostridium Difficile Toxin - Stool, Per Rectum [[id number]]  (Abnormal) Collected: 01/30/22 1838    Lab Status: Final result Specimen: Stool from Per Rectum Updated: 01/30/22 2232    Narrative:      The following orders were created for panel order Clostridium Difficile Toxin - Stool, Per Rectum.  Procedure                               Abnormality         Status                     ---------                               -----------         ------                     Clostridium Difficile To...[[id number]]  Abnormal            Final result                  Please view results for these tests on the individual orders.    Clostridium Difficile Toxin, PCR - Stool, Per Rectum [[id number]]  (Abnormal) Collected: 01/30/22 1838    Lab Status: Final result Specimen: Stool from Per Rectum Updated: 01/30/22 2232     C. Difficile Toxins by PCR Positive    COVID PRE-OP / PRE-PROCEDURE SCREENING ORDER (NO ISOLATION) - Swab, Nasopharynx [[id number]]  (Normal) Collected: 01/30/22 1527    Lab Status: Final result Specimen: Swab from Nasopharynx Updated: 01/30/22 1612    Narrative:      The following orders were created for panel order COVID PRE-OP / PRE-PROCEDURE SCREENING ORDER (NO ISOLATION) - Swab, Nasopharynx.  Procedure                               Abnormality         Status                     ---------                               -----------         ------                     COVID-19,[facility]SE...[[id number]]  Normal              Final result                 Please view results for these tests on the individual orders.    COVID-19,[facility] CEPHEID/JAM NP SWAB IN TRANSPORT MEDIA 8-12 HR TAT - Swab, Nasopharynx [[id number]]  (Normal) Collected: 01/30/22 1527    Lab Status: Final result Specimen: Swab from Nasopharynx Updated: 01/30/22 1612     COVID19 Not Detected    Narrative:      Fact sheet for providers: https://www.fda.gov/media/686827/download    Fact sheet for patients: https://www.fda.gov/media/482252/download    Test performed by PCR.    Body Fluid Culture - Body Fluid, Peritoneum [[id number]] Collected: 01/30/22 1526    Lab Status: Preliminary result Specimen: Body Fluid from Peritoneum Updated: 02/01/22 0639     Body Fluid Culture No growth at 2 days     Gram Stain No WBCs or organisms seen    Blood Culture - Blood, Arm, Left [[id number]]  (Normal) Collected: 01/30/22 1407    Lab Status: Preliminary result Specimen: Blood from Arm, Left Updated: 01/31/22 1415     Blood Culture No growth at 24 hours    Blood Culture - Blood, Arm, Right [[id number]]   (Normal) Collected: 01/30/22 1407    Lab Status: Preliminary result Specimen: Blood from Arm, Right Updated: 01/31/22 1415     Blood Culture No growth at 24 hours            Assessment:    Generalized weakness  1-systemic sepsis with associated volume depletion in the setting of diarrhea recent hospitalization and recent GI bleed underlying etiology for sepsis could include:              -C. difficile colitis exacerbated by recent use of proton pump inhibitors and prior history of antibiotic use              -Intra-abdominal process with potential perforation of duodenum and evolving intra-abdominal abscesses the patient does not have any specific symptoms and initial PD fluid analysis argues against peritoneal process              -Evolving bacteremic sepsis of unclear etiology  2-recent GI bleed due to duodenal ulcer  3-end-stage renal disease on peritoneal dialysis  4-other diagnosis per primary team.     Recommendations/Discussions:  · See my discussion and recommendations on 1/30/2020.  · Continue with IV Flagyl and oral vancomycin.  · Once her leukocytosis improved and she is able to eat without any interruption and reliably need her oral intake then IV Flagyl can be discontinued.  Low threshold to use vancomycin enema if she shows evidence of clinical or radiological illness.  Jason Tanner MD  2/1/2022  12:27 EST    Much of this encounter note is an electronic transcription/translation of spoken language to printed text. The electronic translation of spoken language may permit erroneous, or at times, nonsensical words or phrases to be inadvertently transcribed; Although I have reviewed the note for such errors, some may still exist

## 2022-02-01 NOTE — PROGRESS NOTES
"Daily progress note      2022    Patient Identification:    Name: Fany Todd  Age: 76 y.o.  Sex: female  :  1945  MRN: [id number]                       Primary Care Physician: Lilibeth Cota MD    Chief Complaint:    Doing better  No new complaints  Wants to go home  Denies any abdominal pain nausea vomiting     History of Present Illness:   Patient is a 76-year-old female, with history of end-stage renal disease on peritoneal dialysis, hyperlipidemia, hypertension.  Patient was recently admitted to the hospital, about 2 weeks ago due to nonbleeding large duodenal ulcer and acute blood loss anemia.  Patient was discharged home on pantoprazole twice a day and Carafate.  At that time, patient was also having generalized weakness.  Since discharge from the hospital, she has not improved much in terms of her generalized weakness unluckily has been progressively getting worse.  She has developed also fatigue as well as shortness of breath during the last day prior to admission.  Patient also stated she has been having diarrhea about 1-2 stools per day for the last few weeks.  Denies admission, patient felt really weak, she was not able to get out of bed.  Patient was brought to the emergency room, in the ER, patient was found to have: Creatinine 9.72, sodium 139, potassium 2.5, WBC 19.18, hemoglobin 11, D 32.8, procalcitonin elevated 1.91, lactate 2.5.  Chest ray show no focal pulmonary consolidation.  Patient was given potassium as well as magnesium in the emergency room, patient was admitted to the hospital for further management.    Review of Systems  Unremarkable    Exam:  Blood pressure 98/68, pulse 92, temperature 98.7 °F (37.1 °C), temperature source Oral, resp. rate 16, height 160 cm (63\"), weight 46.1 kg (101 lb 11.2 oz), SpO2 99 %.    General: Alert, oriented x 3. Cooperative, no distress, appears stated age  HEENT:    Head: Normocephalic, without obvious abnormality, atraumatic  Eyes: EOM " are normal. Pupils are equal, round, and reactive to light.   Oropharynx: Mucosa and tongue dry  Neck: Supple, symmetrical, trachea midline, no adenopathy;              thyroid:  no enlargement/tenderness/nodules;              no carotid bruit or JVD  Cardiovascular: Normal rate, regular rhythm and intact distal pulses.              Exam reveals no gallop and no friction rub. No murmur heard  Chest wall: No tenderness or deformity  Pulmonary: Clear to auscultation bilaterally, respirations unlabored.               No rhonchi, wheezing or rales.   Abdominal: Soft, nontender, bowel sounds active all four quadrants,     no masses, no hepatomegaly, no splenomegaly.  Peritoneal catheter site looks clean.  Extremities: Normal, atraumatic, no cyanosis or edema  Pulses: 2 + symmetric all extremities  Neurological: Patient is alert and oriented to person, place, and time.  No new focal sensorimotor deficit.  Skin: Skin color, texture, normal. Turgor is decreased. No rashes or lesions      Data Review:  Lab Results (last 24 hours)     Procedure Component Value Units Date/Time    CBC & Differential [[id number]]  (Abnormal) Collected: 02/01/22 0606    Specimen: Blood Updated: 02/01/22 0738    Narrative:      The following orders were created for panel order CBC & Differential.  Procedure                               Abnormality         Status                     ---------                               -----------         ------                     CBC Auto Differential[[id number]]        Abnormal            Final result                 Please view results for these tests on the individual orders.    CBC Auto Differential [[id number]]  (Abnormal) Collected: 02/01/22 0606    Specimen: Blood Updated: 02/01/22 0738     WBC 15.03 10*3/mm3      RBC 2.89 10*6/mm3      Hemoglobin 8.9 g/dL      Hematocrit 26.8 %      MCV 92.7 fL      MCH 30.8 pg      MCHC 33.2 g/dL      RDW 17.8 %      RDW-SD 61.1 fl      MPV 10.2 fL      Platelets 325  10*3/mm3      Neutrophil % 87.1 %      Lymphocyte % 5.8 %      Monocyte % 3.9 %      Eosinophil % 1.6 %      Basophil % 0.3 %      Immature Grans % 1.3 %      Neutrophils, Absolute 13.08 10*3/mm3      Lymphocytes, Absolute 0.87 10*3/mm3      Monocytes, Absolute 0.59 10*3/mm3      Eosinophils, Absolute 0.24 10*3/mm3      Basophils, Absolute 0.05 10*3/mm3      Immature Grans, Absolute 0.20 10*3/mm3      nRBC 0.1 /100 WBC     Renal Function Panel [[id number]]  (Abnormal) Collected: 02/01/22 0606    Specimen: Blood Updated: 02/01/22 0705     Glucose 110 mg/dL      BUN 37 mg/dL      Creatinine 9.44 mg/dL      Sodium 135 mmol/L      Potassium 3.3 mmol/L      Chloride 99 mmol/L      CO2 21.6 mmol/L      Calcium 8.1 mg/dL      Albumin 1.70 g/dL      Phosphorus 3.5 mg/dL      Anion Gap 14.4 mmol/L      BUN/Creatinine Ratio 3.9     eGFR Non African Amer 4 mL/min/1.73      Comment: <15 Indicative of kidney failure.        eGFR   Amer --     Comment: <15 Indicative of kidney failure.       Narrative:      GFR Normal >60  Chronic Kidney Disease <60  Kidney Failure <15      Magnesium [[id number]]  (Abnormal) Collected: 02/01/22 0606    Specimen: Blood Updated: 02/01/22 0705     Magnesium 2.9 mg/dL     Body Fluid Culture - Body Fluid, Peritoneum [[id number]] Collected: 01/30/22 1526    Specimen: Body Fluid from Peritoneum Updated: 02/01/22 0639     Body Fluid Culture No growth at 2 days     Gram Stain No WBCs or organisms seen    Blood Culture - Blood, Arm, Left [[id number]]  (Normal) Collected: 01/30/22 1407    Specimen: Blood from Arm, Left Updated: 01/31/22 1415     Blood Culture No growth at 24 hours    Blood Culture - Blood, Arm, Right [[id number]]  (Normal) Collected: 01/30/22 1407    Specimen: Blood from Arm, Right Updated: 01/31/22 1415     Blood Culture No growth at 24 hours    Renal Function Panel [[id number]]  (Abnormal) Collected: 01/31/22 1303    Specimen: Blood Updated: 01/31/22 1413     Glucose 98 mg/dL      BUN 37  mg/dL      Creatinine 10.23 mg/dL      Sodium 134 mmol/L      Potassium 3.2 mmol/L      Chloride 99 mmol/L      CO2 21.0 mmol/L      Calcium 7.8 mg/dL      Albumin 2.30 g/dL      Phosphorus 4.5 mg/dL      Anion Gap 14.0 mmol/L      BUN/Creatinine Ratio 3.6     eGFR Non African Amer 4 mL/min/1.73      Comment: <15 Indicative of kidney failure.        eGFR   Amer --     Comment: <15 Indicative of kidney failure.       Narrative:      GFR Normal >60  Chronic Kidney Disease <60  Kidney Failure <15      Magnesium [[id number]]  (Abnormal) Collected: 01/31/22 1303    Specimen: Blood Updated: 01/31/22 1413     Magnesium 2.7 mg/dL     CBC & Differential [[id number]]  (Abnormal) Collected: 01/31/22 1303    Specimen: Blood Updated: 01/31/22 1400    Narrative:      The following orders were created for panel order CBC & Differential.  Procedure                               Abnormality         Status                     ---------                               -----------         ------                     CBC Auto Differential[[id number]]        Abnormal            Final result                 Please view results for these tests on the individual orders.    CBC Auto Differential [[id number]]  (Abnormal) Collected: 01/31/22 1303    Specimen: Blood Updated: 01/31/22 1400     WBC 19.39 10*3/mm3      RBC 3.08 10*6/mm3      Hemoglobin 9.6 g/dL      Hematocrit 26.8 %      MCV 87.0 fL      MCH 31.2 pg      MCHC 35.8 g/dL      RDW 17.3 %      RDW-SD 54.3 fl      MPV 10.5 fL      Platelets 343 10*3/mm3      Neutrophil % 89.6 %      Lymphocyte % 3.7 %      Monocyte % 3.4 %      Eosinophil % 0.8 %      Basophil % 0.4 %      Immature Grans % 2.1 %      Neutrophils, Absolute 17.37 10*3/mm3      Lymphocytes, Absolute 0.72 10*3/mm3      Monocytes, Absolute 0.65 10*3/mm3      Eosinophils, Absolute 0.16 10*3/mm3      Basophils, Absolute 0.08 10*3/mm3      Immature Grans, Absolute 0.41 10*3/mm3      nRBC 0.1 /100 WBC                  Imaging  Results (All)     Procedure Component Value Units Date/Time    XR Chest 1 View [[id number]] Collected: 01/30/22 1248     Updated: 01/30/22 1253    Narrative:      XR CHEST 1 VW-     HISTORY: Female who is 76 years-old,  weakness     TECHNIQUE: Frontal view of the chest     COMPARISON: 1/10/2022     FINDINGS: Heart size is normal. Aorta is tortuous. Pulmonary vasculature  is unremarkable. No focal pulmonary consolidation, pleural effusion, or  pneumothorax. Old bilateral rib fractures are apparent. No acute osseous  process.       Impression:      No focal pulmonary consolidation. Tortuous aorta. Follow-up  as clinically indicated.     This report was finalized on 1/30/2022 12:50 PM by Dr. Williams Urrutia M.D.             Current Facility-Administered Medications:   •  atorvastatin (LIPITOR) tablet 10 mg, 10 mg, Oral, Nightly, Matthew Mendoza MD, 10 mg at 01/31/22 2143  •  calcitriol (ROCALTROL) capsule 0.25 mcg, 0.25 mcg, Oral, Daily, Matthew Mendoza MD, 0.25 mcg at 02/01/22 0857  •  cholecalciferol (VITAMIN D3) tablet 1,000 Units, 1,000 Units, Oral, Daily, Matthew Mendoza MD, 1,000 Units at 02/01/22 0857  •  famotidine (PEPCID) tablet 20 mg, 20 mg, Oral, BID, Matthew Mendoza MD, 20 mg at 02/01/22 0857  •  levothyroxine (SYNTHROID, LEVOTHROID) tablet 25 mcg, 25 mcg, Oral, Q AM, Matthew Mendoza MD, 25 mcg at 02/01/22 0613  •  metroNIDAZOLE (FLAGYL) 500 mg/100mL IVPB, 500 mg, Intravenous, Q8H, CiroJason MD, 500 mg at 02/01/22 1100  •  sevelamer (RENVELA) tablet 800 mg, 800 mg, Oral, TID With Meals, Matthew Mendoza MD, 800 mg at 02/01/22 1153  •  sucralfate (CARAFATE) tablet 1 g, 1 g, Oral, 4x Daily AC & at Bedtime, Matthew Mendoza MD, 1 g at 02/01/22 1045  •  vancomycin (VANCOCIN) capsule 125 mg, 125 mg, Oral, Q6H, Jason Tanner MD, 125 mg at 02/01/22 1153    ASSESSMENT  Acute C. difficile colitis  End-stage renal disease on peritoneal dialysis  Hypertension  Hyperlipidemia  Hypothyroidism  Chronic anemia  Gastroesophageal  reflux disease    PLAN  CPM  Oral vancomycin for 10 days  Peritoneal dialysis per nephrology  Infectious disease to follow patient  Continue home medications   Stress ulcer DVT prophylaxis   Supportive care  PT/OT  Discussed with nursing staff  Discharge planning    Matthew Mendoza MD  2/1/2022  13:26 EST    I wore protective equipment throughout this patient encounter including a face mask, gloves, and protective eyewear.  Hand hygiene was performed before donning protective equipment and after removal when leaving the room.

## 2022-02-02 LAB
ALBUMIN SERPL-MCNC: 2.1 G/DL (ref 3.5–5.2)
ANION GAP SERPL CALCULATED.3IONS-SCNC: 11.5 MMOL/L (ref 5–15)
BACTERIA FLD CULT: NORMAL
BASOPHILS # BLD AUTO: 0.05 10*3/MM3 (ref 0–0.2)
BASOPHILS NFR BLD AUTO: 0.5 % (ref 0–1.5)
BUN SERPL-MCNC: 32 MG/DL (ref 8–23)
BUN/CREAT SERPL: 4.1 (ref 7–25)
CALCIUM SPEC-SCNC: 8 MG/DL (ref 8.6–10.5)
CHLORIDE SERPL-SCNC: 100 MMOL/L (ref 98–107)
CO2 SERPL-SCNC: 24.5 MMOL/L (ref 22–29)
CREAT SERPL-MCNC: 7.72 MG/DL (ref 0.57–1)
DEPRECATED RDW RBC AUTO: 58.6 FL (ref 37–54)
EOSINOPHIL # BLD AUTO: 0.27 10*3/MM3 (ref 0–0.4)
EOSINOPHIL NFR BLD AUTO: 2.9 % (ref 0.3–6.2)
ERYTHROCYTE [DISTWIDTH] IN BLOOD BY AUTOMATED COUNT: 17.3 % (ref 12.3–15.4)
GFR SERPL CREATININE-BSD FRML MDRD: 5 ML/MIN/1.73
GFR SERPL CREATININE-BSD FRML MDRD: ABNORMAL ML/MIN/{1.73_M2}
GLUCOSE SERPL-MCNC: 111 MG/DL (ref 65–99)
GRAM STN SPEC: NORMAL
HCT VFR BLD AUTO: 26.7 % (ref 34–46.6)
HGB BLD-MCNC: 8.8 G/DL (ref 12–15.9)
IMM GRANULOCYTES # BLD AUTO: 0.15 10*3/MM3 (ref 0–0.05)
IMM GRANULOCYTES NFR BLD AUTO: 1.6 % (ref 0–0.5)
LYMPHOCYTES # BLD AUTO: 1.01 10*3/MM3 (ref 0.7–3.1)
LYMPHOCYTES NFR BLD AUTO: 11 % (ref 19.6–45.3)
MCH RBC QN AUTO: 30.2 PG (ref 26.6–33)
MCHC RBC AUTO-ENTMCNC: 33 G/DL (ref 31.5–35.7)
MCV RBC AUTO: 91.8 FL (ref 79–97)
MONOCYTES # BLD AUTO: 0.38 10*3/MM3 (ref 0.1–0.9)
MONOCYTES NFR BLD AUTO: 4.1 % (ref 5–12)
NEUTROPHILS NFR BLD AUTO: 7.33 10*3/MM3 (ref 1.7–7)
NEUTROPHILS NFR BLD AUTO: 79.9 % (ref 42.7–76)
NRBC BLD AUTO-RTO: 0 /100 WBC (ref 0–0.2)
PHOSPHATE SERPL-MCNC: 3.3 MG/DL (ref 2.5–4.5)
PLATELET # BLD AUTO: 324 10*3/MM3 (ref 140–450)
PMV BLD AUTO: 10.2 FL (ref 6–12)
POTASSIUM SERPL-SCNC: 3.2 MMOL/L (ref 3.5–5.2)
RBC # BLD AUTO: 2.91 10*6/MM3 (ref 3.77–5.28)
SODIUM SERPL-SCNC: 136 MMOL/L (ref 136–145)
WBC NRBC COR # BLD: 9.19 10*3/MM3 (ref 3.4–10.8)

## 2022-02-02 PROCEDURE — 97530 THERAPEUTIC ACTIVITIES: CPT

## 2022-02-02 PROCEDURE — 97535 SELF CARE MNGMENT TRAINING: CPT

## 2022-02-02 PROCEDURE — 97165 OT EVAL LOW COMPLEX 30 MIN: CPT

## 2022-02-02 PROCEDURE — 80069 RENAL FUNCTION PANEL: CPT | Performed by: INTERNAL MEDICINE

## 2022-02-02 PROCEDURE — 97110 THERAPEUTIC EXERCISES: CPT

## 2022-02-02 PROCEDURE — 97162 PT EVAL MOD COMPLEX 30 MIN: CPT

## 2022-02-02 PROCEDURE — 85025 COMPLETE CBC W/AUTO DIFF WBC: CPT | Performed by: INTERNAL MEDICINE

## 2022-02-02 RX ORDER — POTASSIUM CHLORIDE 750 MG/1
20 TABLET, FILM COATED, EXTENDED RELEASE ORAL DAILY
Status: DISCONTINUED | OUTPATIENT
Start: 2022-02-02 | End: 2022-02-02

## 2022-02-02 RX ORDER — POTASSIUM CHLORIDE 750 MG/1
20 TABLET, FILM COATED, EXTENDED RELEASE ORAL 2 TIMES DAILY WITH MEALS
Status: DISCONTINUED | OUTPATIENT
Start: 2022-02-02 | End: 2022-02-05 | Stop reason: HOSPADM

## 2022-02-02 RX ADMIN — SUCRALFATE 1 G: 1 TABLET ORAL at 17:56

## 2022-02-02 RX ADMIN — METRONIDAZOLE 500 MG: 500 INJECTION, SOLUTION INTRAVENOUS at 00:11

## 2022-02-02 RX ADMIN — POTASSIUM CHLORIDE 20 MEQ: 10 TABLET, EXTENDED RELEASE ORAL at 17:56

## 2022-02-02 RX ADMIN — VANCOMYCIN HYDROCHLORIDE 125 MG: 125 CAPSULE ORAL at 17:56

## 2022-02-02 RX ADMIN — METRONIDAZOLE 500 MG: 500 INJECTION, SOLUTION INTRAVENOUS at 17:56

## 2022-02-02 RX ADMIN — METRONIDAZOLE 500 MG: 500 INJECTION, SOLUTION INTRAVENOUS at 10:00

## 2022-02-02 RX ADMIN — POTASSIUM CHLORIDE 20 MEQ: 750 TABLET, EXTENDED RELEASE ORAL at 10:54

## 2022-02-02 RX ADMIN — VANCOMYCIN HYDROCHLORIDE 125 MG: 125 CAPSULE ORAL at 12:55

## 2022-02-02 RX ADMIN — SEVELAMER CARBONATE 800 MG: 800 TABLET, FILM COATED ORAL at 17:56

## 2022-02-02 RX ADMIN — LEVOTHYROXINE SODIUM 25 MCG: 0.03 TABLET ORAL at 06:23

## 2022-02-02 RX ADMIN — METRONIDAZOLE 500 MG: 500 INJECTION, SOLUTION INTRAVENOUS at 23:55

## 2022-02-02 RX ADMIN — VANCOMYCIN HYDROCHLORIDE 125 MG: 125 CAPSULE ORAL at 02:03

## 2022-02-02 RX ADMIN — ATORVASTATIN CALCIUM 10 MG: 20 TABLET, FILM COATED ORAL at 20:47

## 2022-02-02 RX ADMIN — VANCOMYCIN HYDROCHLORIDE 125 MG: 125 CAPSULE ORAL at 23:55

## 2022-02-02 RX ADMIN — SEVELAMER CARBONATE 800 MG: 800 TABLET, FILM COATED ORAL at 10:00

## 2022-02-02 RX ADMIN — SUCRALFATE 1 G: 1 TABLET ORAL at 20:47

## 2022-02-02 RX ADMIN — SUCRALFATE 1 G: 1 TABLET ORAL at 12:55

## 2022-02-02 RX ADMIN — SUCRALFATE 1 G: 1 TABLET ORAL at 06:23

## 2022-02-02 RX ADMIN — SEVELAMER CARBONATE 800 MG: 800 TABLET, FILM COATED ORAL at 12:55

## 2022-02-02 RX ADMIN — CALCITRIOL 0.25 MCG: 0.25 CAPSULE ORAL at 10:00

## 2022-02-02 RX ADMIN — Medication 1000 UNITS: at 10:00

## 2022-02-02 RX ADMIN — VANCOMYCIN HYDROCHLORIDE 125 MG: 125 CAPSULE ORAL at 10:00

## 2022-02-02 NOTE — PLAN OF CARE
Goal Outcome Evaluation:  Plan of Care Reviewed With: patient           Outcome Summary: PD exchanges completed this shift as ordered, tolerated well,  Pt still with loose stools, up to BSC.  Pt eating better this evening and in good mood.  VSS, all needs met this shift, will continue to monitor

## 2022-02-02 NOTE — DISCHARGE PLACEMENT REQUEST
"Fany Wagner (76 y.o. Female)             Date of Birth Social Security Number Address Home Phone MRN    1945  [address]  [facility] [id number] [phone] [id number]    Sikhism Marital Status             Unknown Single       Admission Date Admission Type Admitting Provider Attending Provider Department, Room/Bed    1/30/22 Emergency Matthew Mendoza MD Ahmed, Aftab, MD [address], [address]    Discharge Date Discharge Disposition Discharge Destination                         Attending Provider: Matthew Mendoza MD    Allergies: No Known Allergies    Isolation: Spore   Infection: C.difficile (01/30/22)   Code Status: CPR   Advance Care Planning Activity    Ht: 160 cm (63\")   Wt: 46.1 kg (101 lb 11.2 oz)    Admission Cmt: None   Principal Problem: None                Active Insurance as of 1/30/2022     Primary Coverage     Payor Plan Insurance Group Employer/Plan Group    MEDICARE MEDICARE A & B      Payor Plan Address Payor Plan Phone Number Payor Plan Fax Number Effective Dates    [address] [phone]  2/1/2011 - None Entered    [facility] [id number]       Subscriber Name Subscriber Birth Date Member ID       FANY WAGNER 1945 [id number]           Secondary Coverage     Payor Plan Insurance Group Employer/Plan Group    AARP  SUP A[employer] HEALTH CARE OPTIONS      Payor Plan Address Payor Plan Phone Number Payor Plan Fax Number Effective Dates    [facility] [phone]  1/1/2016 - None Entered    [address]       [facility] [address]       Subscriber Name Subscriber Birth Date Member ID       FANY WAGNER 1945 [id number]                 Emergency Contacts      (Rel.) Home Phone Work Phone Mobile Phone    MiguellionKim (Sister) [phone] -- --    PerezAdriane (Sister) [phone] -- --              "
yes

## 2022-02-02 NOTE — PROGRESS NOTES
"  Infectious Diseases Progress Note    Jason Tanner MD     [facility]  [place]: 3 days  Patient Identification:  Name: Fany Todd  Age: 76 y.o.  Sex: female  :  1945  MRN: [id number]         Primary Care Physician: Lilibeth Cota MD            Subjective: feeling a little better and has more energy. Diarrhea is better  Interval History: See consultation note.    Objective:    Scheduled Meds:atorvastatin, 10 mg, Oral, Nightly  calcitriol, 0.25 mcg, Oral, Daily  cholecalciferol, 1,000 Units, Oral, Daily  famotidine, 20 mg, Oral, BID  levothyroxine, 25 mcg, Oral, Q AM  metroNIDAZOLE, 500 mg, Intravenous, Q8H  potassium chloride, 20 mEq, Oral, Daily  sevelamer, 800 mg, Oral, TID With Meals  sucralfate, 1 g, Oral, 4x Daily AC & at Bedtime  vancomycin, 125 mg, Oral, Q6H      Continuous Infusions:     Vital signs in last 24 hours:  Temp:  [98 °F (36.7 °C)-99.2 °F (37.3 °C)] 98 °F (36.7 °C)  Heart Rate:  [] 85  Resp:  [16] 16  BP: ()/(56-68) 98/56    Intake/Output:    Intake/Output Summary (Last 24 hours) at 2022 1028  Last data filed at 2022 0955  Gross per 24 hour   Intake 9185 ml   Output 6900 ml   Net 2285 ml       Exam:  BP 98/56 (BP Location: Right arm, Patient Position: Lying)   Pulse 85   Temp 98 °F (36.7 °C) (Oral)   Resp 16   Ht 160 cm (63\")   Wt 46.1 kg (101 lb 11.2 oz)   SpO2 95%   BMI 18.02 kg/m²   Patient is examined using the personal protective equipment as per guidelines from infection control for this particular patient as enacted.  Hand washing was performed before and after patient interaction.  General Appearance:    Alert, cooperative, no distress, AAOx3                          Head:    Normocephalic, without obvious abnormality, atraumatic                           Eyes:    PERRL, conjunctivae/corneas clear, EOM's intact, both eyes                         Throat:   Lips, tongue, gums normal; oral mucosa pink and moist                           " Neck:   Supple, symmetrical, trachea midline, no JVD                         Lungs:    Clear to auscultation bilaterally, respirations unlabored                 Chest Wall:    No tenderness or deformity                          Heart:    Regular rate and rhythm, S1 and S2 normal                  Abdomen:   Soft no guarding rigidity or rebound noted generalized tenderness.  PD catheter in place with clear dialysate fluid.                 Extremities:   Extremities normal, atraumatic, no cyanosis or edema                        Pulses:   Pulses palpable in all extremities                            Skin:   Skin is warm and dry,  no rashes or palpable lesions                  Neurologic: Grossly nonfocal.       Data Review:    I reviewed the patient's new clinical results.  Results from last 7 days   Lab Units 02/02/22  0705 02/01/22  0606 01/31/22  1303 01/30/22  1302   WBC 10*3/mm3 9.19 15.03* 19.39* 19.19*   HEMOGLOBIN g/dL 8.8* 8.9* 9.6* 11.0*   PLATELETS 10*3/mm3 324 325 343 381     Results from last 7 days   Lab Units 02/02/22  0704 02/01/22  0606 01/31/22  1303 01/30/22  1302   SODIUM mmol/L 136 135* 134* 139   POTASSIUM mmol/L 3.2* 3.3* 3.2* 2.5*   CHLORIDE mmol/L 100 99 99 95*   CO2 mmol/L 24.5 21.6* 21.0* 24.3   BUN mg/dL 32* 37* 37* 28*   CREATININE mg/dL 7.72* 9.44* 10.23* 9.72*   CALCIUM mg/dL 8.0* 8.1* 7.8* 7.9*   GLUCOSE mg/dL 111* 110* 98 113*     Microbiology Results (last 10 days)     Procedure Component Value - Date/Time    Clostridium Difficile Toxin - Stool, Per Rectum [[id number]]  (Abnormal) Collected: 01/30/22 1838    Lab Status: Final result Specimen: Stool from Per Rectum Updated: 01/30/22 2232    Narrative:      The following orders were created for panel order Clostridium Difficile Toxin - Stool, Per Rectum.  Procedure                               Abnormality         Status                     ---------                               -----------         ------                     Clostridium  Difficile To...[[id number]]  Abnormal            Final result                 Please view results for these tests on the individual orders.    Clostridium Difficile Toxin, PCR - Stool, Per Rectum [[id number]]  (Abnormal) Collected: 01/30/22 1838    Lab Status: Final result Specimen: Stool from Per Rectum Updated: 01/30/22 2232     C. Difficile Toxins by PCR Positive    COVID PRE-OP / PRE-PROCEDURE SCREENING ORDER (NO ISOLATION) - Swab, Nasopharynx [[id number]]  (Normal) Collected: 01/30/22 1527    Lab Status: Final result Specimen: Swab from Nasopharynx Updated: 01/30/22 1612    Narrative:      The following orders were created for panel order COVID PRE-OP / PRE-PROCEDURE SCREENING ORDER (NO ISOLATION) - Swab, Nasopharynx.  Procedure                               Abnormality         Status                     ---------                               -----------         ------                     COVID-19,[facility]SE...[[id number]]  Normal              Final result                 Please view results for these tests on the individual orders.    COVID-19,[facility] CEPHEID/JAM NP SWAB IN TRANSPORT MEDIA 8-12 HR TAT - Swab, Nasopharynx [[id number]]  (Normal) Collected: 01/30/22 1527    Lab Status: Final result Specimen: Swab from Nasopharynx Updated: 01/30/22 1612     COVID19 Not Detected    Narrative:      Fact sheet for providers: https://www.fda.gov/media/794716/download    Fact sheet for patients: https://www.fda.gov/media/369849/download    Test performed by PCR.    Body Fluid Culture - Body Fluid, Peritoneum [[id number]] Collected: 01/30/22 1526    Lab Status: Final result Specimen: Body Fluid from Peritoneum Updated: 02/02/22 0609     Body Fluid Culture No growth at 3 days     Gram Stain No WBCs or organisms seen    Blood Culture - Blood, Arm, Left [[id number]]  (Normal) Collected: 01/30/22 1407    Lab Status: Preliminary result Specimen: Blood from Arm, Left Updated: 02/01/22 1415     Blood Culture No growth  at 2 days    Blood Culture - Blood, Arm, Right [[id number]]  (Normal) Collected: 01/30/22 1407    Lab Status: Preliminary result Specimen: Blood from Arm, Right Updated: 02/01/22 1415     Blood Culture No growth at 2 days            Assessment:    Generalized weakness  1-systemic sepsis with associated volume depletion in the setting of diarrhea recent hospitalization and recent GI bleed underlying etiology for sepsis could include:              -C. difficile colitis exacerbated by recent use of proton pump inhibitors and prior history of antibiotic use              -Intra-abdominal process with potential perforation of duodenum and evolving intra-abdominal abscesses the patient does not have any specific symptoms and initial PD fluid analysis argues against peritoneal process              -Evolving bacteremic sepsis of unclear etiology  2-recent GI bleed due to duodenal ulcer  3-end-stage renal disease on peritoneal dialysis  4-other diagnosis per primary team.     Recommendations/Discussions:  · See my discussion and recommendations on 1/30/2020.  · Continue with IV Flagyl and oral vancomycin.  · Once her leukocytosis improved and she is able to eat without any interruption and reliably need her oral intake then IV Flagyl can be discontinued.  Low threshold to use vancomycin enema if she shows evidence of clinical or radiological illness.  Jason Tanner MD  2/2/2022  10:28 EST    Much of this encounter note is an electronic transcription/translation of spoken language to printed text. The electronic translation of spoken language may permit erroneous, or at times, nonsensical words or phrases to be inadvertently transcribed; Although I have reviewed the note for such errors, some may still exist

## 2022-02-02 NOTE — PLAN OF CARE
Goal Outcome Evaluation:  Plan of Care Reviewed With: patient        Progress: improving  Outcome Summary: Pt seen by OT this date for evaluation. Pt is a 77 y/o female admit to [facility] for C-Diff presenting with generalized weakness. Pt had recent hospital admission due to nonbleeding large duodenal ulcer and acute blood loss anemia. Pt then went home and is currently back. Pt reports that she lives at home alone and up until ~2 weeks ago pt was independent with all ADLs/IADLs. Pt also reports that she never had to use any AD for functional mobility until recently when one of friends allowed her to borrow a rollator. PMHx includes HTN, End stage Renal Disease on peritoneal dialysis. Upon arrival pt lying supine in bed, no c/o pain, A&O x4. Pt Mod I for bed mobility to perform sup>sit transition. Pt able to doff/don socks sitting EOB with SBA. Pt performed sit>stand transition with use of rolling walker and CGA to ambulate to room sink to complete grooming task in standing with S/U. Pt noted leaning on sink due to fatigue this date. Pt then ambulated to chair where pt was left sitting, needs in reach, RN aware. Pt to benefit from skilled OT services to addres goals and deficits. Pt noted with decrease BUE strength, and decrease endurance/activity tolerance. OT rec Home with A at D/C. OT wore mask, gloves, glasses, hand hygiene performed, appropriate ppe worn during encounter.

## 2022-02-02 NOTE — THERAPY EVALUATION
Patient Name: Fany Todd  : 1945    MRN: [id number]                              Today's Date: 2022       Admit Date: 2022    Visit Dx:     ICD-10-CM ICD-9-CM   1. Generalized weakness  R53.1 780.79   2. Hypokalemia  E87.6 276.8   3. Leukocytosis, unspecified type  D72.829 288.60     Patient Active Problem List   Diagnosis   • Anemia   • History of breast cancer   • ESRD (end stage renal disease) (HCC)   • Generalized abdominal pain   • Heme positive stool   • Generalized weakness     Past Medical History:   Diagnosis Date   • Anemia    • Breast cancer (HCC) 2015    Left, Stage I   • Chronic renal insufficiency    • End stage renal disease (HCC)     On dialysis since    • Hyperlipidemia    • Hypertension    • Peritoneal dialysis catheter in place (HCC)    • Peritoneal dialysis catheter in place (HCC)      Past Surgical History:   Procedure Laterality Date   • APPENDECTOMY     • BREAST BIOPSY Left    • COLONOSCOPY     • COLONOSCOPY N/A 1/15/2022    Procedure: COLONOSCOPY TO CECUM;  Surgeon: Gilmar Rosen MD;  Location: [facility] ENDOSCOPY;  Service: Gastroenterology;  Laterality: N/A;  GI BLEED  --HEMATIN    • ENDOSCOPY N/A 1/15/2022    Procedure: ESOPHAGOGASTRODUODENOSCOPY;  Surgeon: Gilmar Rosen MD;  Location: [facility] ENDOSCOPY;  Service: Gastroenterology;  Laterality: N/A;  GI BLEED  --DUODENAL ULCER    • FOOT SURGERY      BROKEN FOOT    • INSERTION PERITONEAL DIALYSIS CATHETER N/A 2021    Procedure: REMOVAL AND PLACEMENT OF PERITONEAL DIALYSIS CATHETER LAPAROSCOPIC;  Surgeon: Pablo Severino MD;  Location: [facility] MAIN OR;  Service: General;  Laterality: N/A;   • MASTECTOMY Left    • TUBAL ABDOMINAL LIGATION        General Information     Row Name 22 1416          Physical Therapy Time and Intention    Document Type evaluation  -DJ     Mode of Treatment individual therapy; physical therapy  -DJ     Row Name 22 1416          General Information     Patient Profile Reviewed yes  -DJ     Prior Level of Function independent:; ADL's; all household mobility; driving  -DJ     Existing Precautions/Restrictions fall  -DJ     Row Name 02/02/22 1416          Living Environment    Lives With alone  -DJ     Row Name 02/02/22 1416          Home Main Entrance    Number of Stairs, Main Entrance four  -DJ     Row Name 02/02/22 1416          Stairs Within Home, Primary    Number of Stairs, Within Home, Primary none  -DJ     Row Name 02/02/22 1416          Cognition    Orientation Status (Cognition) oriented x 4  pleasant and cooperative, in good spirits  -DJ     Row Name 02/02/22 1416          Safety Issues, Functional Mobility    Impairments Affecting Function (Mobility) endurance/activity tolerance; strength; balance  -DJ     Comment, Safety Issues/Impairments (Mobility) gt belt, nonskid socks  -DJ           User Key  (r) = Recorded By, (t) = Taken By, (c) = Cosigned By    Initials Name Provider Type    Susana Miller, PT Physical Therapist               Mobility     Row Name 02/02/22 1417          Bed Mobility    Bed Mobility supine-sit; sit-supine  -DJ     Supine-Sit [place] (Bed Mobility) modified independence  -DJ     Assistive Device (Bed Mobility) bed rails; head of bed elevated  -DJ     Comment (Bed Mobility) moves well  -DJ     Row Name 02/02/22 1417          Transfers    Comment (Transfers) sit/stand from EOB  -DJ     Row Name 02/02/22 1417          Bed-Chair Transfer    Bed-Chair [place] (Transfers) not tested  -DJ     Row Name 02/02/22 1417          Sit-Stand Transfer    Sit-Stand [place] (Transfers) contact guard; verbal cues  -DJ     Assistive Device (Sit-Stand Transfers) walker, front-wheeled  -DJ     Row Name 02/02/22 1417          Gait/Stairs (Locomotion)    [place] Level (Gait) contact guard; verbal cues  -DJ     Assistive Device (Gait) walker, front-wheeled  -DJ     Distance in Feet (Gait) 140'  -DJ     Deviations/Abnormal Patterns  (Gait) stride length decreased  -DJ     Bilateral Gait Deviations forward flexed posture  -DJ     [place] Level (Stairs) not tested  -DJ     Comment (Gait/Stairs) Pt amb 140' with r wx and CGA - flexed posture, fair balance with wx, slow pace, decreased endurance that limits further amb distance  -DJ           User Key  (r) = Recorded By, (t) = Taken By, (c) = Cosigned By    Initials Name Provider Type    Susana Miller, TIFFANIE Physical Therapist               Obj/Interventions     Row Name 02/02/22 1419          Range of Motion Comprehensive    General Range of Motion no range of motion deficits identified  -DJ     Row Name 02/02/22 1419          Strength Comprehensive (MMT)    Comment, General Manual Muscle Testing (MMT) Assessment good extremity strength  -DJ     Row Name 02/02/22 1419          Motor Skills    Motor Skills functional endurance  -DJ     Functional Endurance fatigues with amb  -DJ     Row Name 02/02/22 1419          Balance    Balance Assessment sitting static balance; standing static balance; standing dynamic balance  -DJ     Static Sitting Balance WNL; unsupported; sitting, edge of bed  -DJ     Static Standing Balance WFL; supported; standing  -DJ     Dynamic Standing Balance WFL; supported; standing  -DJ     Balance Interventions sitting; standing; sit to stand; supported; weight shifting activity  -DJ     Comment, Balance fair balance with r wx  -DJ     Row Name 02/02/22 1419          Sensory Assessment (Somatosensory)    Sensory Assessment (Somatosensory) not tested  -DJ           User Key  (r) = Recorded By, (t) = Taken By, (c) = Cosigned By    Initials Name Provider Type    Susana Miller, TIFFANIE Physical Therapist               Goals/Plan     Row Name 02/02/22 1430          Transfer Goal 1 (PT)    Activity/Assistive Device (Transfer Goal 1, PT) sit-to-stand/stand-to-sit  -DJ     [place] Level/Cues Needed (Transfer Goal 1, PT) standby assist; verbal cues required  -DJ     Time Frame  (Transfer Goal 1, PT) 1 week  -DJ     Row Name 02/02/22 1430          Gait Training Goal 1 (PT)    Activity/Assistive Device (Gait Training Goal 1, PT) gait (walking locomotion); assistive device use; improve balance and speed; increase endurance/gait distance; increase energy conservation; walker, rolling  -DJ     [place] Level (Gait Training Goal 1, PT) standby assist  -DJ     Distance (Gait Training Goal 1, PT) >300'  -DJ     Time Frame (Gait Training Goal 1, PT) 1 week  -DJ     Row Name 02/02/22 1430          Stairs Goal 1 (PT)    Activity/Assistive Device (Stairs Goal 1, PT) ascending stairs; descending stairs  -DJ     [place] Level/Cues Needed (Stairs Goal 1, PT) contact guard assist  -DJ     Number of Stairs (Stairs Goal 1, PT) 4  -DJ     Row Name 02/02/22 1430          Problem Specific Goal 1 (PT)    Time Frame (Problem Specific Goal 1, PT) 1 week  -DJ     Row Name 02/02/22 1430          Patient Education Goal (PT)    Activity (Patient Education Goal, PT) HEP  -DJ     [place]/Cues/Accuracy (Memory Goal 2, PT) demonstrates adequately; verbalizes understanding  -DJ     Time Frame (Patient Education Goal, PT) 5 days  -DJ           User Key  (r) = Recorded By, (t) = Taken By, (c) = Cosigned By    Initials Name Provider Type    Susana Miller, PT Physical Therapist               Clinical Impression     Row Name 02/02/22 1421          Pain    Additional Documentation Pain Scale: Numbers Pre/Post-Treatment (Group)  -DJ     Row Name 02/02/22 1421          Pain Scale: Numbers Pre/Post-Treatment    Pretreatment Pain Rating 0/10 - no pain  -DJ     Pre/Posttreatment Pain Comment c/c is weakness due to Cdiff  -DJ     Row Name 02/02/22 1421          Plan of Care Review    Plan of Care Reviewed With patient  -DJ     Outcome Summary 75yo frail appearing white female admitted 1/30/22 with gradual onset of weakness over 2 weeks and Cdiff. PMH includes anemia, breast CA, ESKD,  abd pain, bloody stool. PLOF: Pt  lives alone at home with 4 YESSENIA. Pt was independent with ADLs and mobility using rollator as well as driving. She is primarily limited at this time by general weakness and decreased activity tolerance that limit her functional activities. Today, pt was independent with bed mobility. She req CGA for sit/stand from EOB. She amb 140' with r wx and CGA, slow pace, fair balance, flexed posture, fatigues with amb. Pt anxious about returning home with impending weather threats. Recommend d/c home with home health services  -DJ     Row Name 02/02/22 1421          Therapy Assessment/Plan (PT)    Patient/Family Therapy Goals Statement (PT) home  -DJ     Rehab Potential (PT) good, to achieve stated therapy goals  -DJ     Criteria for Skilled Interventions Met (PT) yes; meets criteria; skilled treatment is necessary  -DJ     Row Name 02/02/22 1421          Vital Signs    O2 Delivery Pre Treatment room air  -DJ     O2 Delivery Intra Treatment room air  -DJ     O2 Delivery Post Treatment room air  -DJ     Pre Patient Position Supine  -DJ     Intra Patient Position Standing  -DJ     Post Patient Position Supine  -DJ     Row Name 02/02/22 1421          Positioning and Restraints    Pre-Treatment Position in bed  -DJ     Post Treatment Position bed  -DJ     In Bed notified nsg; supine; call light within reach; encouraged to call for assist  exit alarm malfunctioning  -DJ           User Key  (r) = Recorded By, (t) = Taken By, (c) = Cosigned By    Initials Name Provider Type    Susana Miller, PT Physical Therapist               Outcome Measures     Row Name 02/02/22 1431          How much help from another person do you currently need...    Turning from your back to your side while in flat bed without using bedrails? 4  -DJ     Moving from lying on back to sitting on the side of a flat bed without bedrails? 4  -DJ     Moving to and from a bed to a chair (including a wheelchair)? 3  -DJ     Standing up from a chair using your arms  (e.g., wheelchair, bedside chair)? 3  -DJ     Climbing 3-5 steps with a railing? 2  -DJ     To walk in hospital room? 3  -DJ     AM-PAC 6 Clicks Score (PT) 19  -DJ     Row Name 02/02/22 1431 02/02/22 1202       Functional Assessment    Outcome Measure Options AM-PAC 6 Clicks Basic Mobility (PT)  -DJ AM-PAC 6 Clicks Daily Activity (OT)  -BL          User Key  (r) = Recorded By, (t) = Taken By, (c) = Cosigned By    Initials Name Provider Type    Susana Miller, PT Physical Therapist    Cadence Bentley OT Occupational Therapist                             Physical Therapy Education                 Title: PT OT SLP Therapies (In Progress)     Topic: Physical Therapy (In Progress)     Point: Mobility training (Done)     Learning Progress Summary           Patient Acceptance, E, VU,NR by  at 2/2/2022 1432                   Point: Home exercise program (Not Started)     Learner Progress:  Not documented in this visit.          Point: Body mechanics (Done)     Learning Progress Summary           Patient Acceptance, E, VU,NR by  at 2/2/2022 1432                   Point: Precautions (Done)     Learning Progress Summary           Patient Acceptance, E, VU,NR by  at 2/2/2022 1432                               User Key     Initials Effective Dates Name Provider Type Discipline     10/25/19 -  Susnaa Rhodes, PT Physical Therapist PT              PT Recommendation and Plan  Planned Therapy Interventions (PT): balance training, bed mobility training, gait training, home exercise program, strengthening, stair training, postural re-education, patient/family education, transfer training  Plan of Care Reviewed With: patient  Outcome Summary: 75yo frail appearing white female admitted 1/30/22 with gradual onset of weakness over 2 weeks and Cdiff. PMH includes anemia, breast CA, ESKD,  abd pain, bloody stool. PLOF: Pt lives alone at home with 4 YESSENIA. Pt was independent with ADLs and mobility using rollator as well as driving.  She is primarily limited at this time by general weakness and decreased activity tolerance that limit her functional activities. Today, pt was independent with bed mobility. She req CGA for sit/stand from EOB. She amb 140' with r wx and CGA, slow pace, fair balance, flexed posture, fatigues with amb. Pt anxious about returning home with impending weather threats. Recommend d/c home with home health services     Time Calculation:    PT Charges     Row Name 02/02/22 1433             Time Calculation    Start Time 1400  -DJ      Stop Time 1416  -DJ      Time Calculation (min) 16 min  -DJ      PT Non-Billable Time (min) 10 min  -DJ      PT Received On 02/02/22  -DJ      PT - Next Appointment 02/03/22  -DJ      PT Goal Re-Cert Due Date 02/09/22  -DJ            User Key  (r) = Recorded By, (t) = Taken By, (c) = Cosigned By    Initials Name Provider Type    Susana Miller PT Physical Therapist              Therapy Charges for Today     Code Description Service Date Service Provider Modifiers Qty    12486319996 HC PT EVAL MOD COMPLEXITY 2 2/2/2022 Susana Rhodes, PT GP 1    70947542731 HC PT THER PROC EA 15 MIN 2/2/2022 Susana Rhodes, PT GP 1    97546147644 HC PT THER SUPP EA 15 MIN 2/2/2022 Susana Rhodes, PT GP 1          PT G-Codes  Outcome Measure Options: AM-PAC 6 Clicks Basic Mobility (PT)  AM-PAC 6 Clicks Score (PT): 19  AM-PAC 6 Clicks Score (OT): 21    Susana Rhodes PT  2/2/2022

## 2022-02-02 NOTE — CASE MANAGEMENT/SOCIAL WORK
Continued Stay Note  [facility]     Patient Name: Fany Todd  MRN: [id number]  Today's Date: 2/2/2022    Admit Date: 1/30/2022     Discharge Plan     Row Name 02/02/22 1615       Plan    Plan Return home with HH -= pending referral    Patient/Family in Agreement with Plan yes    Plan Comments Received call from sister Kim Ortez.  She is concerned about patient returning home.  She requested list of In-Home Personal Care agencies - will leave at bedside, she is aware that this is private pay.  She also requested that CCP speak with patient about using HH.  Spoke with patient at bedside and she is agreeable to HH - she is agreeable to referral to any accepting HH agency.  Spoke with [facility] - she will evaluate patient.  CCP will continue to follow.  BHumeniuk RN                Expected Discharge Date and Time     Expected Discharge Date Expected Discharge Time    Feb 4, 2022             Becky S. Humeniuk, RN

## 2022-02-02 NOTE — THERAPY EVALUATION
Patient Name: Fany Todd  : 1945    MRN: [id number]                              Today's Date: 2022       Admit Date: 2022    Visit Dx:     ICD-10-CM ICD-9-CM   1. Generalized weakness  R53.1 780.79   2. Hypokalemia  E87.6 276.8   3. Leukocytosis, unspecified type  D72.829 288.60     Patient Active Problem List   Diagnosis   • Anemia   • History of breast cancer   • ESRD (end stage renal disease) (HCC)   • Generalized abdominal pain   • Heme positive stool   • Generalized weakness     Past Medical History:   Diagnosis Date   • Anemia    • Breast cancer (HCC) 2015    Left, Stage I   • Chronic renal insufficiency    • End stage renal disease (HCC)     On dialysis since    • Hyperlipidemia    • Hypertension    • Peritoneal dialysis catheter in place (HCC)    • Peritoneal dialysis catheter in place (HCC)      Past Surgical History:   Procedure Laterality Date   • APPENDECTOMY     • BREAST BIOPSY Left    • COLONOSCOPY     • COLONOSCOPY N/A 1/15/2022    Procedure: COLONOSCOPY TO CECUM;  Surgeon: Gilmar Rosen MD;  Location: [facility] ENDOSCOPY;  Service: Gastroenterology;  Laterality: N/A;  GI BLEED  --HEMATIN    • ENDOSCOPY N/A 1/15/2022    Procedure: ESOPHAGOGASTRODUODENOSCOPY;  Surgeon: Gilmar Rosen MD;  Location: [facility] ENDOSCOPY;  Service: Gastroenterology;  Laterality: N/A;  GI BLEED  --DUODENAL ULCER    • FOOT SURGERY      BROKEN FOOT    • INSERTION PERITONEAL DIALYSIS CATHETER N/A 2021    Procedure: REMOVAL AND PLACEMENT OF PERITONEAL DIALYSIS CATHETER LAPAROSCOPIC;  Surgeon: Pablo Severino MD;  Location: [facility] MAIN OR;  Service: General;  Laterality: N/A;   • MASTECTOMY Left    • TUBAL ABDOMINAL LIGATION        General Information     Row Name 22 1135          OT Time and Intention    Document Type evaluation  -BL     Mode of Treatment individual therapy; occupational therapy  -BL     Row Name 22 1135          General Information    Patient  Profile Reviewed yes  -     Existing Precautions/Restrictions fall  -     Barriers to Rehab none identified  -     Row Name 02/02/22 1135          Living Environment    Lives With alone  -     Row Name 02/02/22 1135          Home Main Entrance    Number of Stairs, Main Entrance four  The steps are at the back door with hand rail  -     Stair Railings, Main Entrance railings safe and in good condition  -     Row Name 02/02/22 1135          Cognition    Orientation Status (Cognition) oriented x 4  -     Row Name 02/02/22 1135          Safety Issues, Functional Mobility    Safety Issues Affecting Function (Mobility) insight into deficits/self-awareness  -     Impairments Affecting Function (Mobility) endurance/activity tolerance; strength; balance  -           User Key  (r) = Recorded By, (t) = Taken By, (c) = Cosigned By    Initials Name Provider Type     Cadence Miramontes OT Occupational Therapist                 Mobility/ADL's     [facility] Name 02/02/22 1149          Bed Mobility    Bed Mobility supine-sit  -     Supine-Sit [place] (Bed Mobility) modified independence  -     Assistive Device (Bed Mobility) bed rails; head of bed elevated  -[facility] Name 02/02/22 1149          Transfers    Transfers sit-stand transfer; bed-chair transfer  -     Bed-Chair [place] (Transfers) contact guard; verbal cues  -     Assistive Device (Bed-Chair Transfers) walker, front-wheeled  -     Sit-Stand [place] (Transfers) contact guard; verbal cues  -[facility] Name 02/02/22 1149          Sit-Stand Transfer    Assistive Device (Sit-Stand Transfers) walker, front-wheeled  -[facility] Name 02/02/22 1149          Activities of Daily Living    BADL Assessment/Intervention grooming; lower body dressing  -     Row Name 02/02/22 1149          Grooming Assessment/Training    [place] Level (Grooming) wash face, hands; oral care regimen; hair care, combing/brushing; set up  -     Position  (Grooming) supported standing; sink side  -BL     Row Name 02/02/22 1149          Lower Body Dressing Assessment/Training    [place] Level (Lower Body Dressing) don; socks; standby assist  -BL     Position (Lower Body Dressing) edge of bed sitting  -BL           User Key  (r) = Recorded By, (t) = Taken By, (c) = Cosigned By    Initials Name Provider Type    BL Cadence Miramontes OT Occupational Therapist               Obj/Interventions     Row Name 02/02/22 1150          Sensory Assessment (Somatosensory)    Sensory Assessment (Somatosensory) UE sensation intact  -BL     Row Name 02/02/22 1150          Vision Assessment/Intervention    Visual Impairment/Limitations WFL  wears glasses only for distance  -BL     Row Name 02/02/22 1150          Range of Motion Comprehensive    General Range of Motion bilateral upper extremity ROM WNL  -BL     Row Name 02/02/22 1150          Strength Comprehensive (MMT)    Comment, General Manual Muscle Testing (MMT) Assessment BUE 3-/5  -BL     Row Name 02/02/22 1150          Motor Skills    Motor Skills coordination  -BL     Coordination WFL; bilateral; upper extremity  -BL     Row Name 02/02/22 1150          Balance    Balance Assessment sitting static balance; sitting dynamic balance; sit to stand dynamic balance; standing static balance; standing dynamic balance  -BL     Static Sitting Balance WNL; sitting, edge of bed; unsupported  -BL     Dynamic Sitting Balance WNL; unsupported; sitting, edge of bed  -BL     Sit to Stand Dynamic Balance WFL; supported; standing  -BL     Static Standing Balance WFL; supported; standing  -BL     Dynamic Standing Balance mild impairment; supported; standing  -BL     Balance Interventions sitting; standing; supported; sit to stand; static; dynamic; occupation based/functional task  -BL           User Key  (r) = Recorded By, (t) = Taken By, (c) = Cosigned By    Initials Name Provider Type    BL Cadence Miramontes OT Occupational Therapist                Goals/Plan     Row Name 02/02/22 1159          Transfer Goal 1 (OT)    Activity/Assistive Device (Transfer Goal 1, OT) transfers, all  -BL     [place] Level/Cues Needed (Transfer Goal 1, OT) modified independence  -BL     Time Frame (Transfer Goal 1, OT) short term goal (STG); 2 weeks  -BL     Progress/Outcome (Transfer Goal 1, OT) continuing progress toward goal  -BL     Row Name 02/02/22 1159          Bathing Goal 1 (OT)    Activity/Device (Bathing Goal 1, OT) bathing skills, all  -BL     [place] Level/Cues Needed (Bathing Goal 1, OT) set-up required  -BL     Time Frame (Bathing Goal 1, OT) short term goal (STG); 2 weeks  -BL     Progress/Outcomes (Bathing Goal 1, OT) continuing progress toward goal  -BL     Row Name 02/02/22 1159          Strength Goal 1 (OT)    Strength Goal 1 (OT) Pt will be independent with HEP prior to D/C.  -BL     Time Frame (Strength Goal 1, OT) short term goal (STG); 2 weeks  -BL     Progress/Outcome (Strength Goal 1, OT) continuing progress toward goal  -BL     Row Name 02/02/22 1159          Problem Specific Goal 1 (OT)    Problem Specific Goal 1 (OT) Pt will engage in standing functional task for ~10 minutes while maintaining stable sats prior to D/C in preparation for completing ADL task  -BL     Time Frame (Problem Specific Goal 1, OT) short term goal (STG); 2 weeks  -BL     Progress/Outcome (Problem Specific Goal 1, OT) continuing progress toward goal  -BL     Row Name 02/02/22 1159          Therapy Assessment/Plan (OT)    Planned Therapy Interventions (OT) activity tolerance training; BADL retraining; IADL retraining; occupation/activity based interventions; strengthening exercise; transfer/mobility retraining; patient/caregiver education/training; ROM/therapeutic exercise  -BL           User Key  (r) = Recorded By, (t) = Taken By, (c) = Cosigned By    Initials Name Provider Type    Cadence Bentley OT Occupational Therapist               Clinical Impression      Row Name 02/02/22 1151          Pain Assessment    Additional Documentation Pain Scale: Numbers Pre/Post-Treatment (Group)  -BL     Row Name 02/02/22 1151          Pain Scale: Numbers Pre/Post-Treatment    Pretreatment Pain Rating 0/10 - no pain  -BL     Posttreatment Pain Rating 0/10 - no pain  -BL     Row Name 02/02/22 1151          Plan of Care Review    Plan of Care Reviewed With patient  -BL     Progress improving  -BL     Outcome Summary Pt seen by OT this date for evaluation. Pt is a 75 y/o female admit to [facility] for C-Diff presenting with generalized weakness. Pt had recent hospital admission due to nonbleeding large duodenal ulcer and acute blood loss anemia. Pt then went home and is currently back. Pt reports that she lives at home alone and up until ~2 weeks ago pt was independent with all ADLs/IADLs. Pt also reports that she never had to use any AD for functional mobility until recently when one of friends allowed her to borrow a rollator. PMHx includes HTN, End stage Renal Disease on peritoneal dialysis. Upon arrival pt lying supine in bed, no c/o pain, A&O x4. Pt Mod I for bed mobility to perform sup>sit transition. Pt able to doff/don socks sitting EOB with SBA. Pt performed sit>stand transition with use of rolling walker and CGA to ambulate to room sink to complete grooming task in standing with S/U. Pt noted leaning on sink due to fatigue this date. Pt then ambulated to chair where pt was left sitting, needs in reach, RN aware. Pt to benefit from skilled OT services to addres goals and deficits. Pt noted with decrease BUE strength, and decrease endurance/activity tolerance. OT rec Home with A at D/C. OT wore mask, gloves, glasses, hand hygiene performed, appropriate ppe worn during encounter.  -BL     Row Name 02/02/22 1151          Therapy Assessment/Plan (OT)    Rehab Potential (OT) good, to achieve stated therapy goals  -BL     Criteria for Skilled Therapeutic Interventions Met (OT) skilled  treatment is necessary  -BL     Therapy Frequency (OT) 3 times/wk  -BL     Row Name 02/02/22 1151          Therapy Plan Review/Discharge Plan (OT)    Anticipated Discharge Disposition (OT) home with assist  -BL     Row Name 02/02/22 1151          Vital Signs    Pre Patient Position Supine  -BL     Intra Patient Position Standing  -BL     Post Patient Position Sitting  -BL     Row Name 02/02/22 1151          Positioning and Restraints    Pre-Treatment Position in bed  -BL     Post Treatment Position chair  -BL     In Chair reclined; call light within reach; encouraged to call for assist  -BL           User Key  (r) = Recorded By, (t) = Taken By, (c) = Cosigned By    Initials Name Provider Type    Cadence Bentley OT Occupational Therapist               Outcome Measures     Row Name 02/02/22 1202          How much help from another is currently needed...    Putting on and taking off regular lower body clothing? 4  -BL     Bathing (including washing, rinsing, and drying) 3  -BL     Toileting (which includes using toilet bed pan or urinal) 3  -BL     Putting on and taking off regular upper body clothing 4  -BL     Taking care of personal grooming (such as brushing teeth) 3  -BL     Eating meals 4  -BL     AM-PAC 6 Clicks Score (OT) 21  -BL     Row Name 02/02/22 1202          Functional Assessment    Outcome Measure Options AM-PAC 6 Clicks Daily Activity (OT)  -BL           User Key  (r) = Recorded By, (t) = Taken By, (c) = Cosigned By    Initials Name Provider Type    Cadence Bentley OT Occupational Therapist                Occupational Therapy Education                 Title: PT OT SLP Therapies (In Progress)     Topic: Occupational Therapy (In Progress)     Point: ADL training (Done)     Description:   Instruct learner(s) on proper safety adaptation and remediation techniques during self care or transfers.   Instruct in proper use of assistive devices.              Learning Progress Summary           Patient  Josemanuel, ANGE VU by  at 2/2/2022 1202    Comment: The role of OT                   Point: Home exercise program (Not Started)     Description:   Instruct learner(s) on appropriate technique for monitoring, assisting and/or progressing therapeutic exercises/activities.              Learner Progress:  Not documented in this visit.          Point: Precautions (Not Started)     Description:   Instruct learner(s) on prescribed precautions during self-care and functional transfers.              Learner Progress:  Not documented in this visit.          Point: Body mechanics (Not Started)     Description:   Instruct learner(s) on proper positioning and spine alignment during self-care, functional mobility activities and/or exercises.              Learner Progress:  Not documented in this visit.                      User Key     Initials Effective Dates Name Provider Type Discipline     01/05/21 -  Cadence Miramontes OT Occupational Therapist OT              OT Recommendation and Plan  Planned Therapy Interventions (OT): activity tolerance training, BADL retraining, IADL retraining, occupation/activity based interventions, strengthening exercise, transfer/mobility retraining, patient/caregiver education/training, ROM/therapeutic exercise  Therapy Frequency (OT): 3 times/wk  Plan of Care Review  Plan of Care Reviewed With: patient  Progress: improving  Outcome Summary: Pt seen by OT this date for evaluation. Pt is a 77 y/o female admit to [facility] for C-Diff presenting with generalized weakness. Pt had recent hospital admission due to nonbleeding large duodenal ulcer and acute blood loss anemia. Pt then went home and is currently back. Pt reports that she lives at home alone and up until ~2 weeks ago pt was independent with all ADLs/IADLs. Pt also reports that she never had to use any AD for functional mobility until recently when one of friends allowed her to borrow a rollator. PMHx includes HTN, End stage Renal Disease on  peritoneal dialysis. Upon arrival pt lying supine in bed, no c/o pain, A&O x4. Pt Mod I for bed mobility to perform sup>sit transition. Pt able to doff/don socks sitting EOB with SBA. Pt performed sit>stand transition with use of rolling walker and CGA to ambulate to room sink to complete grooming task in standing with S/U. Pt noted leaning on sink due to fatigue this date. Pt then ambulated to chair where pt was left sitting, needs in reach, RN aware. Pt to benefit from skilled OT services to addres goals and deficits. Pt noted with decrease BUE strength, and decrease endurance/activity tolerance. OT rec Home with A at D/C. OT wore mask, gloves, glasses, hand hygiene performed, appropriate ppe worn during encounter.     Time Calculation:    Time Calculation- OT     Row Name 02/02/22 1202             Time Calculation- OT    OT Start Time 0812  -BL      OT Stop Time 0849  -BL      OT Time Calculation (min) 37 min  -BL      Total Timed Code Minutes- OT 30 minute(s)  -BL      OT Received On 02/02/22  -BL      OT - Next Appointment 02/03/22  -BL      OT Goal Re-Cert Due Date 02/16/22  -BL              Timed Charges    87293 - OT Therapeutic Activity Minutes 10  -BL      78849 - OT Self Care/Mgmt Minutes 20  -BL              Untimed Charges    OT Eval/Re-eval Minutes 7  -BL              Total Minutes    Timed Charges Total Minutes 30  -BL      Untimed Charges Total Minutes 7  -BL       Total Minutes 37  -BL            User Key  (r) = Recorded By, (t) = Taken By, (c) = Cosigned By    Initials Name Provider Type     Cadence Miramontes OT Occupational Therapist              Therapy Charges for Today     Code Description Service Date Service Provider Modifiers Qty    72011206737 HC OT THERAPEUTIC ACT EA 15 MIN 2/2/2022 Cadence Miramontes OT GO 1    28969277719 HC OT SELF CARE/MGMT/TRAIN EA 15 MIN 2/2/2022 Cadence Miramontes OT GO 1    65191185945 HC OT EVAL LOW COMPLEXITY 2 2/2/2022 Cadence Miramontes OT GO 1                Cadence Miramontes, OT  2/2/2022

## 2022-02-02 NOTE — PLAN OF CARE
Goal Outcome Evaluation:  Plan of Care Reviewed With: patient           Outcome Summary: 75yo frail appearing white female admitted 1/30/22 with gradual onset of weakness over 2 weeks and Cdiff. PMH includes anemia, breast CA, ESKD,  abd pain, bloody stool. PLOF: Pt lives alone at home with 4 YESSENIA. Pt was independent with ADLs and mobility using rollator as well as driving. She is primarily limited at this time by general weakness and decreased activity tolerance that limit her functional activities. Today, pt was independent with bed mobility. She req CGA for sit/stand from EOB. She amb 140' with r wx and CGA, slow pace, fair balance, flexed posture, fatigues with amb. Pt anxious about returning home with impending weather threats. Recommend d/c home with home health services  Patient was intermittently wearing a face mask during this therapy encounter. Therapist used appropriate personal protective equipment including eye protection, mask, and gloves.  Mask used was standard procedure mask. Appropriate PPE was worn during the entire therapy session. Hand hygiene was completed before and after therapy session. Patient is in contact spore precautions. PT Philip Bowen

## 2022-02-02 NOTE — PROGRESS NOTES
"Daily progress note      2022    Patient Identification:    Name: Fany Todd  Age: 76 y.o.  Sex: female  :  1945  MRN: [id number]                       Primary Care Physician: Lilibeth Cota MD    Chief Complaint:    Doing better  No new complaints  Denies any abdominal pain nausea vomiting     History of Present Illness:   Patient is a 76-year-old female, with history of end-stage renal disease on peritoneal dialysis, hyperlipidemia, hypertension.  Patient was recently admitted to the hospital, about 2 weeks ago due to nonbleeding large duodenal ulcer and acute blood loss anemia.  Patient was discharged home on pantoprazole twice a day and Carafate.  At that time, patient was also having generalized weakness.  Since discharge from the hospital, she has not improved much in terms of her generalized weakness unluckily has been progressively getting worse.  She has developed also fatigue as well as shortness of breath during the last day prior to admission.  Patient also stated she has been having diarrhea about 1-2 stools per day for the last few weeks.  Denies admission, patient felt really weak, she was not able to get out of bed.  Patient was brought to the emergency room, in the ER, patient was found to have: Creatinine 9.72, sodium 139, potassium 2.5, WBC 19.18, hemoglobin 11, D 32.8, procalcitonin elevated 1.91, lactate 2.5.  Chest ray show no focal pulmonary consolidation.  Patient was given potassium as well as magnesium in the emergency room, patient was admitted to the hospital for further management.    Review of Systems  Unremarkable except generalized weakness    Exam:  Blood pressure 98/56, pulse 85, temperature 98 °F (36.7 °C), temperature source Oral, resp. rate 16, height 160 cm (63\"), weight 46.1 kg (101 lb 11.2 oz), SpO2 95 %.    General: Alert, oriented x 3. Cooperative, no distress, appears stated age  HEENT:    Head: Normocephalic, without obvious abnormality, " atraumatic  Eyes: EOM are normal. Pupils are equal, round, and reactive to light.   Oropharynx: Mucosa and tongue dry  Neck: Supple, symmetrical, trachea midline, no adenopathy;              thyroid:  no enlargement/tenderness/nodules;              no carotid bruit or JVD  Cardiovascular: Normal rate, regular rhythm and intact distal pulses.              Exam reveals no gallop and no friction rub. No murmur heard  Chest wall: No tenderness or deformity  Pulmonary: Clear to auscultation bilaterally, respirations unlabored.               No rhonchi, wheezing or rales.   Abdominal: Soft, nontender, bowel sounds active all four quadrants,     no masses, no hepatomegaly, no splenomegaly.  Peritoneal catheter site looks clean.  Extremities: Normal, atraumatic, no cyanosis or edema  Pulses: 2 + symmetric all extremities  Neurological: Patient is alert and oriented to person, place, and time.  No new focal sensorimotor deficit.  Skin: Skin color, texture, normal. Turgor is decreased. No rashes or lesions      Data Review:  Lab Results (last 24 hours)     Procedure Component Value Units Date/Time    Renal Function Panel [[id number]]  (Abnormal) Collected: 02/02/22 0704    Specimen: Blood Updated: 02/02/22 0909     Glucose 111 mg/dL      BUN 32 mg/dL      Creatinine 7.72 mg/dL      Sodium 136 mmol/L      Potassium 3.2 mmol/L      Chloride 100 mmol/L      CO2 24.5 mmol/L      Calcium 8.0 mg/dL      Albumin 2.10 g/dL      Phosphorus 3.3 mg/dL      Anion Gap 11.5 mmol/L      BUN/Creatinine Ratio 4.1     eGFR Non African Amer 5 mL/min/1.73      Comment: <15 Indicative of kidney failure.        eGFR   Amer --     Comment: <15 Indicative of kidney failure.       Narrative:      GFR Normal >60  Chronic Kidney Disease <60  Kidney Failure <15      CBC & Differential [[id number]]  (Abnormal) Collected: 02/02/22 0705    Specimen: Blood Updated: 02/02/22 0748    Narrative:      The following orders were created for panel order CBC &  Differential.  Procedure                               Abnormality         Status                     ---------                               -----------         ------                     CBC Auto Differential[[id number]]        Abnormal            Final result                 Please view results for these tests on the individual orders.    CBC Auto Differential [[id number]]  (Abnormal) Collected: 02/02/22 0705    Specimen: Blood Updated: 02/02/22 0748     WBC 9.19 10*3/mm3      RBC 2.91 10*6/mm3      Hemoglobin 8.8 g/dL      Hematocrit 26.7 %      MCV 91.8 fL      MCH 30.2 pg      MCHC 33.0 g/dL      RDW 17.3 %      RDW-SD 58.6 fl      MPV 10.2 fL      Platelets 324 10*3/mm3      Neutrophil % 79.9 %      Lymphocyte % 11.0 %      Monocyte % 4.1 %      Eosinophil % 2.9 %      Basophil % 0.5 %      Immature Grans % 1.6 %      Neutrophils, Absolute 7.33 10*3/mm3      Lymphocytes, Absolute 1.01 10*3/mm3      Monocytes, Absolute 0.38 10*3/mm3      Eosinophils, Absolute 0.27 10*3/mm3      Basophils, Absolute 0.05 10*3/mm3      Immature Grans, Absolute 0.15 10*3/mm3      nRBC 0.0 /100 WBC     Body Fluid Culture - Body Fluid, Peritoneum [[id number]] Collected: 01/30/22 1526    Specimen: Body Fluid from Peritoneum Updated: 02/02/22 0609     Body Fluid Culture No growth at 3 days     Gram Stain No WBCs or organisms seen    Blood Culture - Blood, Arm, Right [[id number]]  (Normal) Collected: 01/30/22 1407    Specimen: Blood from Arm, Right Updated: 02/01/22 1415     Blood Culture No growth at 2 days    Blood Culture - Blood, Arm, Left [[id number]]  (Normal) Collected: 01/30/22 1407    Specimen: Blood from Arm, Left Updated: 02/01/22 1415     Blood Culture No growth at 2 days                 Imaging Results (All)     Procedure Component Value Units Date/Time    XR Chest 1 View [[id number]] Collected: 01/30/22 1248     Updated: 01/30/22 1253    Narrative:      XR CHEST 1 VW-     HISTORY: Female who is 76 years-old,  weakness      TECHNIQUE: Frontal view of the chest     COMPARISON: 1/10/2022     FINDINGS: Heart size is normal. Aorta is tortuous. Pulmonary vasculature  is unremarkable. No focal pulmonary consolidation, pleural effusion, or  pneumothorax. Old bilateral rib fractures are apparent. No acute osseous  process.       Impression:      No focal pulmonary consolidation. Tortuous aorta. Follow-up  as clinically indicated.     This report was finalized on 1/30/2022 12:50 PM by Dr. Williams Urrutia M.D.             Current Facility-Administered Medications:   •  atorvastatin (LIPITOR) tablet 10 mg, 10 mg, Oral, Nightly, Matthew Mendoza MD, 10 mg at 02/01/22 2248  •  calcitriol (ROCALTROL) capsule 0.25 mcg, 0.25 mcg, Oral, Daily, Matthew Mendoza MD, 0.25 mcg at 02/02/22 1000  •  cholecalciferol (VITAMIN D3) tablet 1,000 Units, 1,000 Units, Oral, Daily, Matthew Mendoza MD, 1,000 Units at 02/02/22 1000  •  famotidine (PEPCID) tablet 20 mg, 20 mg, Oral, BID, Matthew Mendoza MD, 20 mg at 02/01/22 0857  •  levothyroxine (SYNTHROID, LEVOTHROID) tablet 25 mcg, 25 mcg, Oral, Q AM, Matthew Mendoza MD, 25 mcg at 02/02/22 0623  •  metroNIDAZOLE (FLAGYL) 500 mg/100mL IVPB, 500 mg, Intravenous, Q8H, Jason Tanner MD, 500 mg at 02/02/22 1000  •  potassium chloride (K-DUR,KLOR-CON) ER tablet 20 mEq, 20 mEq, Oral, Daily, ZaheerCarlos MD, 20 mEq at 02/02/22 1054  •  sevelamer (RENVELA) tablet 800 mg, 800 mg, Oral, TID With Meals, Matthew Mendoza MD, 800 mg at 02/02/22 1255  •  sucralfate (CARAFATE) tablet 1 g, 1 g, Oral, 4x Daily AC & at Bedtime, Matthew Mendoza MD, 1 g at 02/02/22 1255  •  vancomycin (VANCOCIN) capsule 125 mg, 125 mg, Oral, Q6H, Jason Tanner MD, 125 mg at 02/02/22 1255    ASSESSMENT  Acute C. difficile colitis  End-stage renal disease on peritoneal dialysis  Hypertension  Hyperlipidemia  Hypothyroidism  Chronic anemia  Gastroesophageal reflux disease    PLAN  CPM  Oral vancomycin for 10 days  Continue IV Flagyl while in  hospital  Peritoneal dialysis per nephrology  Infectious disease to follow patient  Continue home medications   Stress ulcer DVT prophylaxis   Supportive care  PT/OT  Discussed with nursing staff  Discharge planning    Matthew Mendoza MD  2/2/2022  13:58 EST    I wore protective equipment throughout this patient encounter including a face mask, gloves, and protective eyewear.  Hand hygiene was performed before donning protective equipment and after removal when leaving the room.

## 2022-02-02 NOTE — PLAN OF CARE
Goal Outcome Evaluation:  Plan of Care Reviewed With: patient  Progress: improving  Outcome Summary: All needs met. Up to BSC independently. Regular diet. PD performed per orders. VSS. A&O x 4. On room air. NSR on the monitor. IV flagyl. No complaints. Will CTM.

## 2022-02-02 NOTE — PROGRESS NOTES
[facility] Progress Note      Patient Name: Fany Todd  : 1945  MRN: [id number]  Primary Care Physician:  Lilibeth Cota MD  Date of admission: 2022    Subjective     Interval History:   Follow-up end-stage renal disease on peritoneal dialysis    The patient is feeling somewhat better today her diarrhea has significantly improved, no chest pain, no shortness of air, no orthopnea or PND, no abdominal pain, no nausea or vomiting, her peritoneal dialysate is clear.  Tolerating her CAPD without any difficulty has not been ambulating adequately but overnight and this morning has been out of bed and denies lightheadedness    Review of Systems:   As noted above    Objective     Vitals:   Temp:  [98 °F (36.7 °C)-99.2 °F (37.3 °C)] 98 °F (36.7 °C)  Heart Rate:  [] 85  Resp:  [16] 16  BP: ()/(56-68) 98/56    Intake/Output Summary (Last 24 hours) at 2022 0859  Last data filed at 2022 0653  Gross per 24 hour   Intake 96477 ml   Output 9200 ml   Net 1565 ml       Physical Exam:    General Appearance: alert, oriented x 3, no acute distress, appears to be chronically ill and frail  Skin: warm and dry  HEENT: oral mucosa normal, nonicteric sclera  Neck: supple, no JVD  Lungs: CTA, unlabored breathing effort  Heart: RRR, normal S1 and S2, no S3, no rub  Abdomen: soft, nontender, nondistended, normoactive bowels, PD catheter in place exit site is clean and no tunnel tenderness  : no palpable bladder  Extremities: no edema, cyanosis or clubbing  Neuro: normal speech and mental status     Scheduled Meds:     atorvastatin, 10 mg, Oral, Nightly  calcitriol, 0.25 mcg, Oral, Daily  cholecalciferol, 1,000 Units, Oral, Daily  famotidine, 20 mg, Oral, BID  levothyroxine, 25 mcg, Oral, Q AM  metroNIDAZOLE, 500 mg, Intravenous, Q8H  sevelamer, 800 mg, Oral, TID With Meals  sucralfate, 1 g, Oral, 4x Daily AC & at Bedtime  vancomycin, 125 mg, Oral, Q6H      IV Meds:         Results Reviewed:   I have personally reviewed the results from the time of this admission to 2/2/2022 08:59 EST     Results from last 7 days   Lab Units 02/01/22  0606 01/31/22  1303 01/30/22  1302   SODIUM mmol/L 135* 134* 139   POTASSIUM mmol/L 3.3* 3.2* 2.5*   CHLORIDE mmol/L 99 99 95*   CO2 mmol/L 21.6* 21.0* 24.3   BUN mg/dL 37* 37* 28*   CREATININE mg/dL 9.44* 10.23* 9.72*   CALCIUM mg/dL 8.1* 7.8* 7.9*   BILIRUBIN mg/dL  --   --  0.7   ALK PHOS U/L  --   --  139*   ALT (SGPT) U/L  --   --  13   AST (SGOT) U/L  --   --  15   GLUCOSE mg/dL 110* 98 113*       Estimated Creatinine Clearance: 3.7 mL/min (A) (by C-G formula based on SCr of 9.44 mg/dL (H)).    Results from last 7 days   Lab Units 02/01/22  0606 01/31/22  1303 01/30/22  1302   MAGNESIUM mg/dL 2.9* 2.7* 1.5*   PHOSPHORUS mg/dL 3.5 4.5  --              Results from last 7 days   Lab Units 02/02/22  0705 02/01/22  0606 01/31/22  1303 01/30/22  1302   WBC 10*3/mm3 9.19 15.03* 19.39* 19.19*   HEMOGLOBIN g/dL 8.8* 8.9* 9.6* 11.0*   PLATELETS 10*3/mm3 324 325 343 381       Results from last 7 days   Lab Units 01/30/22  1302   INR  1.26*       Assessment / Plan     ASSESSMENT:  1.  End-stage renal disease on peritoneal dialysis the etiology is progressive renal atrophy, the patient is tolerating her peritoneal dialysis without any problems, her PD fluid were clear no significant cell count noted, currently on CAPD 2 L exchanges 1.5% solution.  2.  C. difficile colitis, treated with oral vancomycin and IV metronidazole  3.  Hypokalemia related to severe diarrhea, potassium yesterday was 3.3 and magnesium t was 2.9  4.  Anemia of chronic kidney disease, hemoglobin today is 8.8, meeting goal  5.  Recent duodenal ulcer which was bleeding and severe anemia requiring multiple transfusion.   6.  Secondary hyperparathyroidism metabolic bone disease      PLAN:  1.  Continue the same treatment, continue CAPD  2.  Start KCl 20 mEq daily  3.  If ambulating  adequately she could be discharged from the renal standpoint and she will resume her CCPD    I discussed the case with the patient and she voiced good understanding    Thank you for involving us in the care of Fany Todd.  Please feel free to call with any questions.    Carlos Schwab MD  02/02/22  08:59 [facility]    [facility]  [phone]      Much of this encounter note is an electronic transcription/translation of spoken language to printed text. The electronic translation of spoken language may permit erroneous, or at times, nonsensical words or phrases to be inadvertently transcribed; Although I have reviewed the note for such errors, some may still exist.

## 2022-02-03 LAB
ANION GAP SERPL CALCULATED.3IONS-SCNC: 11.8 MMOL/L (ref 5–15)
BASOPHILS # BLD AUTO: 0.06 10*3/MM3 (ref 0–0.2)
BASOPHILS NFR BLD AUTO: 0.8 % (ref 0–1.5)
BUN SERPL-MCNC: 27 MG/DL (ref 8–23)
BUN/CREAT SERPL: 3.8 (ref 7–25)
CALCIUM SPEC-SCNC: 8.2 MG/DL (ref 8.6–10.5)
CHLORIDE SERPL-SCNC: 99 MMOL/L (ref 98–107)
CO2 SERPL-SCNC: 25.2 MMOL/L (ref 22–29)
CREAT SERPL-MCNC: 7.08 MG/DL (ref 0.57–1)
DEPRECATED RDW RBC AUTO: 56.5 FL (ref 37–54)
EOSINOPHIL # BLD AUTO: 0.27 10*3/MM3 (ref 0–0.4)
EOSINOPHIL NFR BLD AUTO: 3.6 % (ref 0.3–6.2)
ERYTHROCYTE [DISTWIDTH] IN BLOOD BY AUTOMATED COUNT: 16.8 % (ref 12.3–15.4)
GFR SERPL CREATININE-BSD FRML MDRD: 6 ML/MIN/1.73
GFR SERPL CREATININE-BSD FRML MDRD: ABNORMAL ML/MIN/{1.73_M2}
GLUCOSE SERPL-MCNC: 96 MG/DL (ref 65–99)
HCT VFR BLD AUTO: 26.9 % (ref 34–46.6)
HGB BLD-MCNC: 9 G/DL (ref 12–15.9)
IMM GRANULOCYTES # BLD AUTO: 0.15 10*3/MM3 (ref 0–0.05)
IMM GRANULOCYTES NFR BLD AUTO: 2 % (ref 0–0.5)
LYMPHOCYTES # BLD AUTO: 0.83 10*3/MM3 (ref 0.7–3.1)
LYMPHOCYTES NFR BLD AUTO: 11.1 % (ref 19.6–45.3)
MCH RBC QN AUTO: 30.6 PG (ref 26.6–33)
MCHC RBC AUTO-ENTMCNC: 33.5 G/DL (ref 31.5–35.7)
MCV RBC AUTO: 91.5 FL (ref 79–97)
MONOCYTES # BLD AUTO: 0.43 10*3/MM3 (ref 0.1–0.9)
MONOCYTES NFR BLD AUTO: 5.7 % (ref 5–12)
NEUTROPHILS NFR BLD AUTO: 5.76 10*3/MM3 (ref 1.7–7)
NEUTROPHILS NFR BLD AUTO: 76.8 % (ref 42.7–76)
NRBC BLD AUTO-RTO: 0 /100 WBC (ref 0–0.2)
PLATELET # BLD AUTO: 320 10*3/MM3 (ref 140–450)
PMV BLD AUTO: 10.4 FL (ref 6–12)
POTASSIUM SERPL-SCNC: 3.6 MMOL/L (ref 3.5–5.2)
RBC # BLD AUTO: 2.94 10*6/MM3 (ref 3.77–5.28)
SODIUM SERPL-SCNC: 136 MMOL/L (ref 136–145)
WBC NRBC COR # BLD: 7.5 10*3/MM3 (ref 3.4–10.8)

## 2022-02-03 PROCEDURE — 85025 COMPLETE CBC W/AUTO DIFF WBC: CPT | Performed by: HOSPITALIST

## 2022-02-03 PROCEDURE — 80048 BASIC METABOLIC PNL TOTAL CA: CPT | Performed by: HOSPITALIST

## 2022-02-03 PROCEDURE — 97110 THERAPEUTIC EXERCISES: CPT | Performed by: PHYSICAL THERAPIST

## 2022-02-03 RX ADMIN — SEVELAMER CARBONATE 800 MG: 800 TABLET, FILM COATED ORAL at 17:18

## 2022-02-03 RX ADMIN — VANCOMYCIN HYDROCHLORIDE 125 MG: 125 CAPSULE ORAL at 11:44

## 2022-02-03 RX ADMIN — POTASSIUM CHLORIDE 20 MEQ: 10 TABLET, EXTENDED RELEASE ORAL at 17:19

## 2022-02-03 RX ADMIN — CALCITRIOL 0.25 MCG: 0.25 CAPSULE ORAL at 09:56

## 2022-02-03 RX ADMIN — LEVOTHYROXINE SODIUM 25 MCG: 0.03 TABLET ORAL at 06:20

## 2022-02-03 RX ADMIN — SEVELAMER CARBONATE 800 MG: 800 TABLET, FILM COATED ORAL at 11:44

## 2022-02-03 RX ADMIN — VANCOMYCIN HYDROCHLORIDE 125 MG: 125 CAPSULE ORAL at 06:20

## 2022-02-03 RX ADMIN — SEVELAMER CARBONATE 800 MG: 800 TABLET, FILM COATED ORAL at 09:56

## 2022-02-03 RX ADMIN — VANCOMYCIN HYDROCHLORIDE 125 MG: 125 CAPSULE ORAL at 17:19

## 2022-02-03 RX ADMIN — Medication 1000 UNITS: at 09:56

## 2022-02-03 RX ADMIN — SUCRALFATE 1 G: 1 TABLET ORAL at 06:20

## 2022-02-03 RX ADMIN — POTASSIUM CHLORIDE 20 MEQ: 10 TABLET, EXTENDED RELEASE ORAL at 09:56

## 2022-02-03 NOTE — PROGRESS NOTES
"Daily progress note      2/3/2022    Patient Identification:    Name: Fany Todd  Age: 76 y.o.  Sex: female  :  1945  MRN: [id number]                       Primary Care Physician: Lilibeth Cota MD    Chief Complaint:    Doing same  No new complaints  Denies any abdominal pain nausea vomiting diarrhea    History of Present Illness:   Patient is a 76-year-old female, with history of end-stage renal disease on peritoneal dialysis, hyperlipidemia, hypertension.  Patient was recently admitted to the hospital, about 2 weeks ago due to nonbleeding large duodenal ulcer and acute blood loss anemia.  Patient was discharged home on pantoprazole twice a day and Carafate.  At that time, patient was also having generalized weakness.  Since discharge from the hospital, she has not improved much in terms of her generalized weakness unluckily has been progressively getting worse.  She has developed also fatigue as well as shortness of breath during the last day prior to admission.  Patient also stated she has been having diarrhea about 1-2 stools per day for the last few weeks.  Denies admission, patient felt really weak, she was not able to get out of bed.  Patient was brought to the emergency room, in the ER, patient was found to have: Creatinine 9.72, sodium 139, potassium 2.5, WBC 19.18, hemoglobin 11, D 32.8, procalcitonin elevated 1.91, lactate 2.5.  Chest ray show no focal pulmonary consolidation.  Patient was given potassium as well as magnesium in the emergency room, patient was admitted to the hospital for further management.    Review of Systems  Unremarkable except generalized weakness    Exam:  Blood pressure 108/64, pulse 87, temperature 98.6 °F (37 °C), temperature source Oral, resp. rate 16, height 160 cm (63\"), weight 46.1 kg (101 lb 11.2 oz), SpO2 94 %.    General: Alert, oriented x 3. Cooperative, no distress, appears stated age  HEENT:    Head: Normocephalic, without obvious abnormality, " atraumatic  Eyes: EOM are normal. Pupils are equal, round, and reactive to light.   Oropharynx: Mucosa and tongue dry  Neck: Supple, symmetrical, trachea midline, no adenopathy;              thyroid:  no enlargement/tenderness/nodules;              no carotid bruit or JVD  Cardiovascular: Normal rate, regular rhythm and intact distal pulses.              Exam reveals no gallop and no friction rub. No murmur heard  Chest wall: No tenderness or deformity  Pulmonary: Clear to auscultation bilaterally, respirations unlabored.               No rhonchi, wheezing or rales.   Abdominal: Soft, nontender, bowel sounds active all four quadrants,     no masses, no hepatomegaly, no splenomegaly.  Peritoneal catheter site looks clean.  Extremities: Normal, atraumatic, no cyanosis or edema  Pulses: 2 + symmetric all extremities  Neurological: Patient is alert and oriented to person, place, and time.  No new focal sensorimotor deficit.  Skin: Skin color, texture, normal. Turgor is decreased. No rashes or lesions      Data Review:  Lab Results (last 24 hours)     Procedure Component Value Units Date/Time    Basic Metabolic Panel [[id number]]  (Abnormal) Collected: 02/03/22 0613    Specimen: Blood Updated: 02/03/22 0740     Glucose 96 mg/dL      BUN 27 mg/dL      Creatinine 7.08 mg/dL      Sodium 136 mmol/L      Potassium 3.6 mmol/L      Chloride 99 mmol/L      CO2 25.2 mmol/L      Calcium 8.2 mg/dL      eGFR   Amer --     Comment: <15 Indicative of kidney failure.        eGFR Non African Amer 6 mL/min/1.73      Comment: <15 Indicative of kidney failure.        BUN/Creatinine Ratio 3.8     Anion Gap 11.8 mmol/L     Narrative:      GFR Normal >60  Chronic Kidney Disease <60  Kidney Failure <15      CBC & Differential [[id number]]  (Abnormal) Collected: 02/03/22 0613    Specimen: Blood Updated: 02/03/22 0735    Narrative:      The following orders were created for panel order CBC & Differential.  Procedure                                Abnormality         Status                     ---------                               -----------         ------                     CBC Auto Differential[[id number]]        Abnormal            Final result                 Please view results for these tests on the individual orders.    CBC Auto Differential [[id number]]  (Abnormal) Collected: 02/03/22 0613    Specimen: Blood Updated: 02/03/22 0735     WBC 7.50 10*3/mm3      RBC 2.94 10*6/mm3      Hemoglobin 9.0 g/dL      Hematocrit 26.9 %      MCV 91.5 fL      MCH 30.6 pg      MCHC 33.5 g/dL      RDW 16.8 %      RDW-SD 56.5 fl      MPV 10.4 fL      Platelets 320 10*3/mm3      Neutrophil % 76.8 %      Lymphocyte % 11.1 %      Monocyte % 5.7 %      Eosinophil % 3.6 %      Basophil % 0.8 %      Immature Grans % 2.0 %      Neutrophils, Absolute 5.76 10*3/mm3      Lymphocytes, Absolute 0.83 10*3/mm3      Monocytes, Absolute 0.43 10*3/mm3      Eosinophils, Absolute 0.27 10*3/mm3      Basophils, Absolute 0.06 10*3/mm3      Immature Grans, Absolute 0.15 10*3/mm3      nRBC 0.0 /100 WBC     Blood Culture - Blood, Arm, Left [[id number]]  (Normal) Collected: 01/30/22 1407    Specimen: Blood from Arm, Left Updated: 02/02/22 1415     Blood Culture No growth at 3 days    Blood Culture - Blood, Arm, Right [[id number]]  (Normal) Collected: 01/30/22 1407    Specimen: Blood from Arm, Right Updated: 02/02/22 1415     Blood Culture No growth at 3 days                 Imaging Results (All)     Procedure Component Value Units Date/Time    XR Chest 1 View [[id number]] Collected: 01/30/22 1248     Updated: 01/30/22 1253    Narrative:      XR CHEST 1 VW-     HISTORY: Female who is 76 years-old,  weakness     TECHNIQUE: Frontal view of the chest     COMPARISON: 1/10/2022     FINDINGS: Heart size is normal. Aorta is tortuous. Pulmonary vasculature  is unremarkable. No focal pulmonary consolidation, pleural effusion, or  pneumothorax. Old bilateral rib fractures are apparent. No acute  osseous  process.       Impression:      No focal pulmonary consolidation. Tortuous aorta. Follow-up  as clinically indicated.     This report was finalized on 1/30/2022 12:50 PM by Dr. Williams Urrutia M.D.             Current Facility-Administered Medications:   •  atorvastatin (LIPITOR) tablet 10 mg, 10 mg, Oral, Nightly, Matthew Mendoza MD, 10 mg at 02/02/22 2047  •  calcitriol (ROCALTROL) capsule 0.25 mcg, 0.25 mcg, Oral, Daily, Matthew Mendoza MD, 0.25 mcg at 02/03/22 0956  •  cholecalciferol (VITAMIN D3) tablet 1,000 Units, 1,000 Units, Oral, Daily, Matthew Mendoza MD, 1,000 Units at 02/03/22 0956  •  famotidine (PEPCID) tablet 20 mg, 20 mg, Oral, BID, Matthew Mendoza MD, 20 mg at 02/01/22 0857  •  levothyroxine (SYNTHROID, LEVOTHROID) tablet 25 mcg, 25 mcg, Oral, Q AM, Matthew Mendoza MD, 25 mcg at 02/03/22 0620  •  potassium chloride (K-DUR,KLOR-CON) ER tablet 20 mEq, 20 mEq, Oral, BID With Meals, Matthew Mendoza MD, 20 mEq at 02/03/22 0956  •  sevelamer (RENVELA) tablet 800 mg, 800 mg, Oral, TID With Meals, Matthew Mendoza MD, 800 mg at 02/03/22 1144  •  sucralfate (CARAFATE) tablet 1 g, 1 g, Oral, 4x Daily AC & at Bedtime, Matthew Mendoza MD, 1 g at 02/03/22 0620  •  vancomycin (VANCOCIN) capsule 125 mg, 125 mg, Oral, Q6H, Jason Tanner MD, 125 mg at 02/03/22 1144    ASSESSMENT  Acute C. difficile colitis  End-stage renal disease on peritoneal dialysis  Hypertension  Hyperlipidemia  Hypothyroidism  Chronic anemia  Gastroesophageal reflux disease    PLAN  CPM  Oral vancomycin for 10 days  Discontinue IV Flagyl   Peritoneal dialysis per nephrology  Infectious disease to follow patient  Continue home medications   Stress ulcer DVT prophylaxis   Supportive care  PT/OT  Discussed with nursing staff  Discharge home in a.m. if more stable and okay with all    Matthew MD Reji  2/3/2022  12:57 EST    I wore protective equipment throughout this patient encounter including a face mask, gloves, and protective eyewear.  Hand  hygiene was performed before donning protective equipment and after removal when leaving the room.

## 2022-02-03 NOTE — PROGRESS NOTES
[facility] Progress Note      Patient Name: Fany Todd  : 1945  MRN: [id number]  Primary Care Physician:  Lilibeth Cota MD  Date of admission: 2022    Subjective     Interval History:   Follow-up end-stage renal disease on peritoneal dialysis  Patient is starting to ambulate with physical therapy her diarrhea is slowing down complaining of being weak, no chest pain or shortness of air, no orthopnea or PND, no nausea or vomiting, no abdominal pain, no lightheadedness, tolerating her peritoneal dialysis without any difficulty    Review of Systems:   As noted above    Objective     Vitals:   Temp:  [98.2 °F (36.8 °C)-98.6 °F (37 °C)] 98.6 °F (37 °C)  Heart Rate:  [85-89] 87  Resp:  [16-18] 16  BP: ()/(52-64) 108/64    Intake/Output Summary (Last 24 hours) at 2/3/2022 1127  Last data filed at 2/3/2022 1105  Gross per 24 hour   Intake 09771 ml   Output 15986 ml   Net 1520 ml       Physical Exam:    General Appearance: alert, oriented x 3, no acute distress, appears to be chronically ill and frail  Skin: warm and dry  HEENT: oral mucosa normal, nonicteric sclera  Neck: supple, no JVD  Lungs: CTA, unlabored breathing effort  Heart: RRR, normal S1 and S2, no S3, no rub  Abdomen: soft, nontender, nondistended, normoactive bowels, PD catheter in place exit site is clean and no tunnel tenderness  : no palpable bladder  Extremities: no edema, cyanosis or clubbing  Neuro: normal speech and mental status     Scheduled Meds:     atorvastatin, 10 mg, Oral, Nightly  calcitriol, 0.25 mcg, Oral, Daily  cholecalciferol, 1,000 Units, Oral, Daily  famotidine, 20 mg, Oral, BID  levothyroxine, 25 mcg, Oral, Q AM  potassium chloride, 20 mEq, Oral, BID With Meals  sevelamer, 800 mg, Oral, TID With Meals  sucralfate, 1 g, Oral, 4x Daily AC & at Bedtime  vancomycin, 125 mg, Oral, Q6H      IV Meds:        Results Reviewed:   I have personally reviewed the results from the time of this  admission to 2/3/2022 11:27 EST     Results from last 7 days   Lab Units 02/03/22  0613 02/02/22  0704 02/01/22  0606 01/31/22  1303 01/30/22  1302   SODIUM mmol/L 136 136 135*   < > 139   POTASSIUM mmol/L 3.6 3.2* 3.3*   < > 2.5*   CHLORIDE mmol/L 99 100 99   < > 95*   CO2 mmol/L 25.2 24.5 21.6*   < > 24.3   BUN mg/dL 27* 32* 37*   < > 28*   CREATININE mg/dL 7.08* 7.72* 9.44*   < > 9.72*   CALCIUM mg/dL 8.2* 8.0* 8.1*   < > 7.9*   BILIRUBIN mg/dL  --   --   --   --  0.7   ALK PHOS U/L  --   --   --   --  139*   ALT (SGPT) U/L  --   --   --   --  13   AST (SGOT) U/L  --   --   --   --  15   GLUCOSE mg/dL 96 111* 110*   < > 113*    < > = values in this interval not displayed.       Estimated Creatinine Clearance: 5 mL/min (A) (by C-G formula based on SCr of 7.08 mg/dL (H)).    Results from last 7 days   Lab Units 02/02/22  0704 02/01/22  0606 01/31/22  1303 01/30/22  1302   MAGNESIUM mg/dL  --  2.9* 2.7* 1.5*   PHOSPHORUS mg/dL 3.3 3.5 4.5  --              Results from last 7 days   Lab Units 02/03/22  0613 02/02/22  0705 02/01/22  0606 01/31/22  1303 01/30/22  1302   WBC 10*3/mm3 7.50 9.19 15.03* 19.39* 19.19*   HEMOGLOBIN g/dL 9.0* 8.8* 8.9* 9.6* 11.0*   PLATELETS 10*3/mm3 320 324 325 343 381       Results from last 7 days   Lab Units 01/30/22  1302   INR  1.26*       Assessment / Plan     ASSESSMENT:  1.  End-stage renal disease on peritoneal dialysis the etiology is progressive renal atrophy, the patient is tolerating her peritoneal dialysis without any problems, her PD fluid were clear no significant cell count noted, currently on CAPD 2 L exchanges 1.5% solution.  2.  C. difficile colitis, treated with oral vancomycin and IV metronidazole  3.  Hypokalemia related to severe diarrhea, potassium improved with supplement except to 3.6 today   4.  Anemia of chronic kidney disease, hemoglobin today is 9, treated with long-acting PAOLO as an outpatient  5.  Recent duodenal ulcer which was bleeding and severe anemia  requiring multiple transfusion.   6.  Secondary hyperparathyroidism metabolic bone disease      PLAN:  1.  Continue the same treatment, continue CAPD  2.  Continue daily KCl 20 mEq daily  3.  If ambulating adequately she could be discharged from the renal standpoint and she will resume her CCPD    I discussed the case with the patient and she voiced good understanding    Thank you for involving us in the care of Fany Todd.  Please feel free to call with any questions.    Carlos Schwab MD  02/03/22  11:27 [facility]    [facility]  [phone]      Much of this encounter note is an electronic transcription/translation of spoken language to printed text. The electronic translation of spoken language may permit erroneous, or at times, nonsensical words or phrases to be inadvertently transcribed; Although I have reviewed the note for such errors, some may still exist.

## 2022-02-03 NOTE — PLAN OF CARE
Goal Outcome Evaluation: Pt resting in bed with no signs of distress noted at this time. VS stable. Pt has tolerated PD well today. Bed in lowest position with siderails up X2. Call light within reach.

## 2022-02-03 NOTE — PROGRESS NOTES
"  Infectious Diseases Progress Note    Jason Tanner MD     [facility]  Los: 4 days  Patient Identification:  Name: Fany Todd  Age: 76 y.o.  Sex: female  :  1945  MRN: [id number]         Primary Care Physician: Lilibeth Cota MD            Subjective: Feeling better and has more energy.  Abdominal pain is resolved.  Had only 2 bowel movements which is getting formed.  Interval History: See consultation note.    Objective:    Scheduled Meds:atorvastatin, 10 mg, Oral, Nightly  calcitriol, 0.25 mcg, Oral, Daily  cholecalciferol, 1,000 Units, Oral, Daily  famotidine, 20 mg, Oral, BID  levothyroxine, 25 mcg, Oral, Q AM  metroNIDAZOLE, 500 mg, Intravenous, Q8H  potassium chloride, 20 mEq, Oral, BID With Meals  sevelamer, 800 mg, Oral, TID With Meals  sucralfate, 1 g, Oral, 4x Daily AC & at Bedtime  vancomycin, 125 mg, Oral, Q6H      Continuous Infusions:     Vital signs in last 24 hours:  Temp:  [98.2 °F (36.8 °C)-98.6 °F (37 °C)] 98.6 °F (37 °C)  Heart Rate:  [85-89] 87  Resp:  [16-18] 16  BP: ()/(52-64) 108/64    Intake/Output:    Intake/Output Summary (Last 24 hours) at 2/3/2022 0851  Last data filed at 2/3/2022 0725  Gross per 24 hour   Intake 22270 ml   Output 8900 ml   Net 2260 ml       Exam:  /64 (BP Location: Right arm, Patient Position: Lying)   Pulse 87   Temp 98.6 °F (37 °C) (Oral)   Resp 16   Ht 160 cm (63\")   Wt 46.1 kg (101 lb 11.2 oz)   SpO2 94%   BMI 18.02 kg/m²   Patient is examined using the personal protective equipment as per guidelines from infection control for this particular patient as enacted.  Hand washing was performed before and after patient interaction.  General Appearance:    Alert, cooperative, no distress, AAOx3                          Head:    Normocephalic, without obvious abnormality, atraumatic                           Eyes:    PERRL, conjunctivae/corneas clear, EOM's intact, both eyes                         Throat:   Lips, tongue, " gums normal; oral mucosa pink and moist                           Neck:   Supple, symmetrical, trachea midline, no JVD                         Lungs:    Clear to auscultation bilaterally, respirations unlabored                 Chest Wall:    No tenderness or deformity                          Heart:    Regular rate and rhythm, S1 and S2 normal                  Abdomen:   Soft no guarding rigidity or rebound noted generalized tenderness.  PD catheter in place with clear dialysate fluid.                 Extremities:   Extremities normal, atraumatic, no cyanosis or edema                        Pulses:   Pulses palpable in all extremities                            Skin:   Skin is warm and dry,  no rashes or palpable lesions                  Neurologic: Grossly nonfocal.       Data Review:    I reviewed the patient's new clinical results.  Results from last 7 days   Lab Units 02/03/22  0613 02/02/22  0705 02/01/22  0606 01/31/22  1303 01/30/22  1302   WBC 10*3/mm3 7.50 9.19 15.03* 19.39* 19.19*   HEMOGLOBIN g/dL 9.0* 8.8* 8.9* 9.6* 11.0*   PLATELETS 10*3/mm3 320 324 325 343 381     Results from last 7 days   Lab Units 02/03/22  0613 02/02/22  0704 02/01/22  0606 01/31/22  1303 01/30/22  1302   SODIUM mmol/L 136 136 135* 134* 139   POTASSIUM mmol/L 3.6 3.2* 3.3* 3.2* 2.5*   CHLORIDE mmol/L 99 100 99 99 95*   CO2 mmol/L 25.2 24.5 21.6* 21.0* 24.3   BUN mg/dL 27* 32* 37* 37* 28*   CREATININE mg/dL 7.08* 7.72* 9.44* 10.23* 9.72*   CALCIUM mg/dL 8.2* 8.0* 8.1* 7.8* 7.9*   GLUCOSE mg/dL 96 111* 110* 98 113*     Microbiology Results (last 10 days)     Procedure Component Value - Date/Time    Clostridium Difficile Toxin - Stool, Per Rectum [[id number]]  (Abnormal) Collected: 01/30/22 1838    Lab Status: Final result Specimen: Stool from Per Rectum Updated: 01/30/22 2232    Narrative:      The following orders were created for panel order Clostridium Difficile Toxin - Stool, Per Rectum.  Procedure                                Abnormality         Status                     ---------                               -----------         ------                     Clostridium Difficile To...[[id number]]  Abnormal            Final result                 Please view results for these tests on the individual orders.    Clostridium Difficile Toxin, PCR - Stool, Per Rectum [[id number]]  (Abnormal) Collected: 01/30/22 1838    Lab Status: Final result Specimen: Stool from Per Rectum Updated: 01/30/22 2232     C. Difficile Toxins by PCR Positive    COVID PRE-OP / PRE-PROCEDURE SCREENING ORDER (NO ISOLATION) - Swab, Nasopharynx [[id number]]  (Normal) Collected: 01/30/22 1527    Lab Status: Final result Specimen: Swab from Nasopharynx Updated: 01/30/22 1612    Narrative:      The following orders were created for panel order COVID PRE-OP / PRE-PROCEDURE SCREENING ORDER (NO ISOLATION) - Swab, Nasopharynx.  Procedure                               Abnormality         Status                     ---------                               -----------         ------                     COVID-19,[facility]SE...[[id number]]  Normal              Final result                 Please view results for these tests on the individual orders.    COVID-19,[facility] CEPHEID/JAM NP SWAB IN TRANSPORT MEDIA 8-12 HR TAT - Swab, Nasopharynx [[id number]]  (Normal) Collected: 01/30/22 1527    Lab Status: Final result Specimen: Swab from Nasopharynx Updated: 01/30/22 1612     COVID19 Not Detected    Narrative:      Fact sheet for providers: https://www.fda.gov/media/067837/download    Fact sheet for patients: https://www.fda.gov/media/997173/download    Test performed by PCR.    Body Fluid Culture - Body Fluid, Peritoneum [[id number]] Collected: 01/30/22 1526    Lab Status: Final result Specimen: Body Fluid from Peritoneum Updated: 02/02/22 0609     Body Fluid Culture No growth at 3 days     Gram Stain No WBCs or organisms seen    Blood Culture - Blood, Arm, Left [[id number]]   (Normal) Collected: 01/30/22 1407    Lab Status: Preliminary result Specimen: Blood from Arm, Left Updated: 02/02/22 1415     Blood Culture No growth at 3 days    Blood Culture - Blood, Arm, Right [[id number]]  (Normal) Collected: 01/30/22 1407    Lab Status: Preliminary result Specimen: Blood from Arm, Right Updated: 02/02/22 1415     Blood Culture No growth at 3 days            Assessment:    Generalized weakness  1-systemic sepsis with associated volume depletion in the setting of diarrhea recent hospitalization and recent GI bleed underlying etiology for sepsis could include:              -C. difficile colitis exacerbated by recent use of proton pump inhibitors and prior history of antibiotic use              -Intra-abdominal process with potential perforation of duodenum and evolving intra-abdominal abscesses the patient does not have any specific symptoms and initial PD fluid analysis argues against peritoneal process              -Evolving bacteremic sepsis of unclear etiology  2-recent GI bleed due to duodenal ulcer  3-end-stage renal disease on peritoneal dialysis  4-other diagnosis per primary team.     Recommendations/Discussions:  · See my recommendations and discussions on 2/2/2022.  · She is significantly improved and her white blood cell counts are better and her appetite is improving and she denies any symptoms of ileus.  · DC IV Flagyl and continue oral vancomycin for total of 10 days as planned with emphasis on avoiding broad-spectrum antibiotic treatment down the road unless it is absolutely necessary.  Jason Tanner MD  2/3/2022  08:51 EST    Much of this encounter note is an electronic transcription/translation of spoken language to printed text. The electronic translation of spoken language may permit erroneous, or at times, nonsensical words or phrases to be inadvertently transcribed; Although I have reviewed the note for such errors, some may still exist

## 2022-02-03 NOTE — PLAN OF CARE
Goal Outcome Evaluation:  Plan of Care Reviewed With: patient        Progress: improving  Outcome Summary: Potential D/C tomorrow or Saturday due to good progress. Limited due to generalized weakness with weight loss/cdiff. Improved gait noted today with ambulation of 160 feet x 1 with RW CGA then 75 feet x 1 with RW CGA with short sitting rest break. Simulated stairs with CGA. Bed mobility mod I.    Patient was intermittely wearing a face mask during this therapy encounter. Therapist used appropriate personal protective equipment including eye protection, mask, and gloves.  Mask used was standard procedure mask. Appropriate PPE was worn during the entire therapy session. Hand hygiene was completed before and after therapy session. Patient is not in enhanced droplet precautions.

## 2022-02-03 NOTE — PLAN OF CARE
Goal Outcome Evaluation:  Plan of Care Reviewed With: patient  Progress: improving  Outcome Summary: All needs met. Up to BSC independently. Regular diet. PD performed per orders. VSS. A&O x 4. RA. NSR. PO antibiotics. No complaints. Potential to d/c. Will CTM.

## 2022-02-04 LAB
ALBUMIN SERPL-MCNC: 2.2 G/DL (ref 3.5–5.2)
ANION GAP SERPL CALCULATED.3IONS-SCNC: 11 MMOL/L (ref 5–15)
BACTERIA SPEC AEROBE CULT: NORMAL
BACTERIA SPEC AEROBE CULT: NORMAL
BASOPHILS # BLD AUTO: 0.05 10*3/MM3 (ref 0–0.2)
BASOPHILS NFR BLD AUTO: 0.9 % (ref 0–1.5)
BUN SERPL-MCNC: 21 MG/DL (ref 8–23)
BUN/CREAT SERPL: 3 (ref 7–25)
CALCIUM SPEC-SCNC: 8.8 MG/DL (ref 8.6–10.5)
CHLORIDE SERPL-SCNC: 101 MMOL/L (ref 98–107)
CO2 SERPL-SCNC: 26 MMOL/L (ref 22–29)
CREAT SERPL-MCNC: 7.04 MG/DL (ref 0.57–1)
DEPRECATED RDW RBC AUTO: 55.5 FL (ref 37–54)
EOSINOPHIL # BLD AUTO: 0.24 10*3/MM3 (ref 0–0.4)
EOSINOPHIL NFR BLD AUTO: 4.1 % (ref 0.3–6.2)
ERYTHROCYTE [DISTWIDTH] IN BLOOD BY AUTOMATED COUNT: 17 % (ref 12.3–15.4)
GFR SERPL CREATININE-BSD FRML MDRD: 6 ML/MIN/1.73
GFR SERPL CREATININE-BSD FRML MDRD: ABNORMAL ML/MIN/{1.73_M2}
GLUCOSE SERPL-MCNC: 94 MG/DL (ref 65–99)
HCT VFR BLD AUTO: 29.3 % (ref 34–46.6)
HGB BLD-MCNC: 9.9 G/DL (ref 12–15.9)
IMM GRANULOCYTES # BLD AUTO: 0.12 10*3/MM3 (ref 0–0.05)
IMM GRANULOCYTES NFR BLD AUTO: 2.1 % (ref 0–0.5)
LYMPHOCYTES # BLD AUTO: 0.76 10*3/MM3 (ref 0.7–3.1)
LYMPHOCYTES NFR BLD AUTO: 13 % (ref 19.6–45.3)
MCH RBC QN AUTO: 30.6 PG (ref 26.6–33)
MCHC RBC AUTO-ENTMCNC: 33.8 G/DL (ref 31.5–35.7)
MCV RBC AUTO: 90.4 FL (ref 79–97)
MONOCYTES # BLD AUTO: 0.41 10*3/MM3 (ref 0.1–0.9)
MONOCYTES NFR BLD AUTO: 7 % (ref 5–12)
NEUTROPHILS NFR BLD AUTO: 4.26 10*3/MM3 (ref 1.7–7)
NEUTROPHILS NFR BLD AUTO: 72.9 % (ref 42.7–76)
NRBC BLD AUTO-RTO: 0 /100 WBC (ref 0–0.2)
PHOSPHATE SERPL-MCNC: 3.2 MG/DL (ref 2.5–4.5)
PLATELET # BLD AUTO: 295 10*3/MM3 (ref 140–450)
PMV BLD AUTO: 10.3 FL (ref 6–12)
POTASSIUM SERPL-SCNC: 3.9 MMOL/L (ref 3.5–5.2)
RBC # BLD AUTO: 3.24 10*6/MM3 (ref 3.77–5.28)
SODIUM SERPL-SCNC: 138 MMOL/L (ref 136–145)
WBC NRBC COR # BLD: 5.84 10*3/MM3 (ref 3.4–10.8)

## 2022-02-04 PROCEDURE — 85025 COMPLETE CBC W/AUTO DIFF WBC: CPT | Performed by: HOSPITALIST

## 2022-02-04 PROCEDURE — 80069 RENAL FUNCTION PANEL: CPT | Performed by: INTERNAL MEDICINE

## 2022-02-04 RX ORDER — POTASSIUM CHLORIDE 20 MEQ/1
20 TABLET, EXTENDED RELEASE ORAL 2 TIMES DAILY WITH MEALS
Qty: 60 TABLET | Refills: 0 | Status: SHIPPED | OUTPATIENT
Start: 2022-02-04 | End: 2022-03-06

## 2022-02-04 RX ORDER — VANCOMYCIN HYDROCHLORIDE 125 MG/1
125 CAPSULE ORAL EVERY 6 HOURS SCHEDULED
Qty: 21 CAPSULE | Refills: 0 | Status: SHIPPED | OUTPATIENT
Start: 2022-02-04 | End: 2022-02-10

## 2022-02-04 RX ORDER — FAMOTIDINE 20 MG/1
20 TABLET, FILM COATED ORAL 2 TIMES DAILY
Qty: 60 TABLET | Refills: 0 | Status: SHIPPED | OUTPATIENT
Start: 2022-02-04 | End: 2022-03-06

## 2022-02-04 RX ADMIN — SEVELAMER CARBONATE 800 MG: 800 TABLET, FILM COATED ORAL at 08:23

## 2022-02-04 RX ADMIN — FAMOTIDINE 20 MG: 20 TABLET, FILM COATED ORAL at 21:08

## 2022-02-04 RX ADMIN — SUCRALFATE 1 G: 1 TABLET ORAL at 18:01

## 2022-02-04 RX ADMIN — SEVELAMER CARBONATE 800 MG: 800 TABLET, FILM COATED ORAL at 12:25

## 2022-02-04 RX ADMIN — VANCOMYCIN HYDROCHLORIDE 125 MG: 125 CAPSULE ORAL at 18:01

## 2022-02-04 RX ADMIN — POTASSIUM CHLORIDE 20 MEQ: 10 TABLET, EXTENDED RELEASE ORAL at 08:23

## 2022-02-04 RX ADMIN — POTASSIUM CHLORIDE 20 MEQ: 10 TABLET, EXTENDED RELEASE ORAL at 18:01

## 2022-02-04 RX ADMIN — Medication 1000 UNITS: at 08:23

## 2022-02-04 RX ADMIN — VANCOMYCIN HYDROCHLORIDE 125 MG: 125 CAPSULE ORAL at 00:26

## 2022-02-04 RX ADMIN — VANCOMYCIN HYDROCHLORIDE 125 MG: 125 CAPSULE ORAL at 06:07

## 2022-02-04 RX ADMIN — SEVELAMER CARBONATE 800 MG: 800 TABLET, FILM COATED ORAL at 18:01

## 2022-02-04 RX ADMIN — FAMOTIDINE 20 MG: 20 TABLET, FILM COATED ORAL at 08:23

## 2022-02-04 RX ADMIN — CALCITRIOL 0.25 MCG: 0.25 CAPSULE ORAL at 08:23

## 2022-02-04 RX ADMIN — VANCOMYCIN HYDROCHLORIDE 125 MG: 125 CAPSULE ORAL at 12:25

## 2022-02-04 RX ADMIN — SUCRALFATE 1 G: 1 TABLET ORAL at 21:08

## 2022-02-04 RX ADMIN — LEVOTHYROXINE SODIUM 25 MCG: 0.03 TABLET ORAL at 06:07

## 2022-02-04 RX ADMIN — SUCRALFATE 1 G: 1 TABLET ORAL at 12:25

## 2022-02-04 RX ADMIN — SUCRALFATE 1 G: 1 TABLET ORAL at 06:07

## 2022-02-04 NOTE — PROGRESS NOTES
"  Infectious Diseases Progress Note    Jason Tanner MD     [facility]  Los: 5 days  Patient Identification:  Name: Fany Todd  Age: 76 y.o.  Sex: female  :  1945  MRN: [id number]         Primary Care Physician: Lilibeth Cota MD            Subjective: Feeling better.  Interval History: See consultation note.    Objective:    Scheduled Meds:calcitriol, 0.25 mcg, Oral, Daily  cholecalciferol, 1,000 Units, Oral, Daily  famotidine, 20 mg, Oral, BID  levothyroxine, 25 mcg, Oral, Q AM  potassium chloride, 20 mEq, Oral, BID With Meals  sevelamer, 800 mg, Oral, TID With Meals  sucralfate, 1 g, Oral, 4x Daily AC & at Bedtime  vancomycin, 125 mg, Oral, Q6H      Continuous Infusions:     Vital signs in last 24 hours:  Temp:  [97.5 °F (36.4 °C)-99 °F (37.2 °C)] 98.6 °F (37 °C)  Heart Rate:  [92-95] 95  Resp:  [16-18] 16  BP: (107-116)/(63-85) 107/63    Intake/Output:    Intake/Output Summary (Last 24 hours) at 2022 1029  Last data filed at 2022 0640  Gross per 24 hour   Intake 80364 ml   Output 31174 ml   Net -1390 ml       Exam:  /63 (BP Location: Left arm, Patient Position: Lying)   Pulse 95   Temp 98.6 °F (37 °C) (Oral)   Resp 16   Ht 160 cm (63\")   Wt 46.1 kg (101 lb 11.2 oz)   SpO2 97%   BMI 18.02 kg/m²   Patient is examined using the personal protective equipment as per guidelines from infection control for this particular patient as enacted.  Hand washing was performed before and after patient interaction.  General Appearance:    Alert, cooperative, no distress, AAOx3                          Head:    Normocephalic, without obvious abnormality, atraumatic                           Eyes:    PERRL, conjunctivae/corneas clear, EOM's intact, both eyes                         Throat:   Lips, tongue, gums normal; oral mucosa pink and moist                           Neck:   Supple, symmetrical, trachea midline, no JVD                         Lungs:    Clear to auscultation " bilaterally, respirations unlabored                 Chest Wall:    No tenderness or deformity                          Heart:    Regular rate and rhythm, S1 and S2 normal                  Abdomen:   Soft no guarding rigidity or rebound noted generalized tenderness.  PD catheter in place with clear dialysate fluid.                 Extremities:   Extremities normal, atraumatic, no cyanosis or edema                        Pulses:   Pulses palpable in all extremities                            Skin:   Skin is warm and dry,  no rashes or palpable lesions                  Neurologic: Grossly nonfocal.       Data Review:    I reviewed the patient's new clinical results.  Results from last 7 days   Lab Units 02/04/22  0541 02/03/22  0613 02/02/22  0705 02/01/22  0606 01/31/22  1303 01/30/22  1302   WBC 10*3/mm3 5.84 7.50 9.19 15.03* 19.39* 19.19*   HEMOGLOBIN g/dL 9.9* 9.0* 8.8* 8.9* 9.6* 11.0*   PLATELETS 10*3/mm3 295 320 324 325 343 381     Results from last 7 days   Lab Units 02/04/22  0541 02/03/22  0613 02/02/22  0704 02/01/22  0606 01/31/22  1303 01/30/22  1302   SODIUM mmol/L 138 136 136 135* 134* 139   POTASSIUM mmol/L 3.9 3.6 3.2* 3.3* 3.2* 2.5*   CHLORIDE mmol/L 101 99 100 99 99 95*   CO2 mmol/L 26.0 25.2 24.5 21.6* 21.0* 24.3   BUN mg/dL 21 27* 32* 37* 37* 28*   CREATININE mg/dL 7.04* 7.08* 7.72* 9.44* 10.23* 9.72*   CALCIUM mg/dL 8.8 8.2* 8.0* 8.1* 7.8* 7.9*   GLUCOSE mg/dL 94 96 111* 110* 98 113*     Microbiology Results (last 10 days)     Procedure Component Value - Date/Time    Clostridium Difficile Toxin - Stool, Per Rectum [[id number]]  (Abnormal) Collected: 01/30/22 1838    Lab Status: Final result Specimen: Stool from Per Rectum Updated: 01/30/22 2232    Narrative:      The following orders were created for panel order Clostridium Difficile Toxin - Stool, Per Rectum.  Procedure                               Abnormality         Status                     ---------                               -----------          ------                     Clostridium Difficile To...[[id number]]  Abnormal            Final result                 Please view results for these tests on the individual orders.    Clostridium Difficile Toxin, PCR - Stool, Per Rectum [[id number]]  (Abnormal) Collected: 01/30/22 1838    Lab Status: Final result Specimen: Stool from Per Rectum Updated: 01/30/22 2232     C. Difficile Toxins by PCR Positive    COVID PRE-OP / PRE-PROCEDURE SCREENING ORDER (NO ISOLATION) - Swab, Nasopharynx [[id number]]  (Normal) Collected: 01/30/22 1527    Lab Status: Final result Specimen: Swab from Nasopharynx Updated: 01/30/22 1612    Narrative:      The following orders were created for panel order COVID PRE-OP / PRE-PROCEDURE SCREENING ORDER (NO ISOLATION) - Swab, Nasopharynx.  Procedure                               Abnormality         Status                     ---------                               -----------         ------                     COVID-19,[facility]...[[id number]]  Normal              Final result                 Please view results for these tests on the individual orders.    COVID-19,[facility] CEPHEID/JAM NP SWAB IN TRANSPORT MEDIA 8-12 HR TAT - Swab, Nasopharynx [[id number]]  (Normal) Collected: 01/30/22 1527    Lab Status: Final result Specimen: Swab from Nasopharynx Updated: 01/30/22 1612     COVID19 Not Detected    Narrative:      Fact sheet for providers: https://www.fda.gov/media/127527/download    Fact sheet for patients: https://www.fda.gov/media/727572/download    Test performed by PCR.    Body Fluid Culture - Body Fluid, Peritoneum [[id number]] Collected: 01/30/22 1526    Lab Status: Final result Specimen: Body Fluid from Peritoneum Updated: 02/02/22 0609     Body Fluid Culture No growth at 3 days     Gram Stain No WBCs or organisms seen    Blood Culture - Blood, Arm, Left [[id number]]  (Normal) Collected: 01/30/22 1407    Lab Status: Preliminary result Specimen: Blood from Arm, Left  Updated: 02/03/22 1415     Blood Culture No growth at 4 days    Blood Culture - Blood, Arm, Right [[id number]]  (Normal) Collected: 01/30/22 1407    Lab Status: Preliminary result Specimen: Blood from Arm, Right Updated: 02/03/22 1415     Blood Culture No growth at 4 days            Assessment:    Generalized weakness  1-systemic sepsis with associated volume depletion in the setting of diarrhea recent hospitalization and recent GI bleed underlying etiology for sepsis could include:              -C. difficile colitis exacerbated by recent use of proton pump inhibitors and prior history of antibiotic use              -Intra-abdominal process with potential perforation of duodenum and evolving intra-abdominal abscesses the patient does not have any specific symptoms and initial PD fluid analysis argues against peritoneal process              -Evolving bacteremic sepsis of unclear etiology  2-recent GI bleed due to duodenal ulcer  3-end-stage renal disease on peritoneal dialysis  4-other diagnosis per primary team.     Recommendations/Discussions:  · See my recommendations and discussions on 2/2/2022.  · She is significantly improved and her white blood cell counts are better and her appetite is improving and she denies any symptoms of ileus.  · Continue oral vancomycin for total of 10 days as planned with emphasis on avoiding broad-spectrum antibiotic treatment down the road unless it is absolutely necessary.  Jason Tanner MD  2/4/2022  10:29 EST    Much of this encounter note is an electronic transcription/translation of spoken language to printed text. The electronic translation of spoken language may permit erroneous, or at times, nonsensical words or phrases to be inadvertently transcribed; Although I have reviewed the note for such errors, some may still exist

## 2022-02-04 NOTE — PLAN OF CARE
Goal Outcome Evaluation:  Plan of Care Reviewed With: patient        Progress: no change  Outcome Summary: Pt has had no complaints of pain or any discomforts. Tolerating all oral meds. Continue PO vanc. One BM this shift. Reamins in cdiff iso. PD exchanges continue w/o issues. Stand by assist to bsc. VSS. Plan to have family transport for d/c in the morning. Will continue to monitor.

## 2022-02-04 NOTE — CASE MANAGEMENT/SOCIAL WORK
Continued Stay Note  [facility]     Patient Name: Fany Todd  MRN: [id number]  Today's Date: 2/4/2022    Admit Date: 1/30/2022     Discharge Plan     Row Name 02/04/22 1613       Plan    Plan Comments DC orders in EPIC.  Spoke with patient and she does not have a ride home today.  Spoke with [employer] [facility] [facility] and they can transport, but will not assist patient into the house.  Patient has 4 steps to enter and is concerned they may be slick from the snow/ice.  Patient states that her sisters will be able to transport in AM and help her get in the house. MD notified. Fabi Durand RN               Discharge Codes    No documentation.               Expected Discharge Date and Time     Expected Discharge Date Expected Discharge Time    Feb 4, 2022             Fabi Durand RN

## 2022-02-04 NOTE — PROGRESS NOTES
"Daily progress note      2022    Patient Identification:    Name: Fany Todd  Age: 76 y.o.  Sex: female  :  1945  MRN: [id number]                       Primary Care Physician: Lilibeth Cota MD    Chief Complaint:    Doing same  No new complaints  Ambulate more than 160 feet   Denies any abdominal pain nausea vomiting diarrhea    History of Present Illness:   Patient is a 76-year-old female, with history of end-stage renal disease on peritoneal dialysis, hyperlipidemia, hypertension.  Patient was recently admitted to the hospital, about 2 weeks ago due to nonbleeding large duodenal ulcer and acute blood loss anemia.  Patient was discharged home on pantoprazole twice a day and Carafate.  At that time, patient was also having generalized weakness.  Since discharge from the hospital, she has not improved much in terms of her generalized weakness unluckily has been progressively getting worse.  She has developed also fatigue as well as shortness of breath during the last day prior to admission.  Patient also stated she has been having diarrhea about 1-2 stools per day for the last few weeks.  Denies admission, patient felt really weak, she was not able to get out of bed.  Patient was brought to the emergency room, in the ER, patient was found to have: Creatinine 9.72, sodium 139, potassium 2.5, WBC 19.18, hemoglobin 11, D 32.8, procalcitonin elevated 1.91, lactate 2.5.  Chest ray show no focal pulmonary consolidation.  Patient was given potassium as well as magnesium in the emergency room, patient was admitted to the hospital for further management.    Review of Systems  Unremarkable except generalized weakness    Exam:  Blood pressure 98/68, pulse 103, temperature 98.6 °F (37 °C), temperature source Oral, resp. rate 18, height 160 cm (63\"), weight 46.1 kg (101 lb 11.2 oz), SpO2 95 %.    General: Alert, oriented x 3. Cooperative, no distress, appears stated age  HEENT:    Head: Normocephalic, " without obvious abnormality, atraumatic  Eyes: EOM are normal. Pupils are equal, round, and reactive to light.   Oropharynx: Mucosa and tongue dry  Neck: Supple, symmetrical, trachea midline, no adenopathy;              thyroid:  no enlargement/tenderness/nodules;              no carotid bruit or JVD  Cardiovascular: Normal rate, regular rhythm and intact distal pulses.              Exam reveals no gallop and no friction rub. No murmur heard  Chest wall: No tenderness or deformity  Pulmonary: Clear to auscultation bilaterally, respirations unlabored.               No rhonchi, wheezing or rales.   Abdominal: Soft, nontender, bowel sounds active all four quadrants,     no masses, no hepatomegaly, no splenomegaly.  Peritoneal catheter site looks clean.  Extremities: Normal, atraumatic, no cyanosis or edema  Pulses: 2 + symmetric all extremities  Neurological: Patient is alert and oriented to person, place, and time.  No new focal sensorimotor deficit.  Skin: Skin color, texture, normal. Turgor is decreased. No rashes or lesions      Data Review:  Lab Results (last 24 hours)     Procedure Component Value Units Date/Time    Blood Culture - Blood, Arm, Right [[id number]]  (Normal) Collected: 01/30/22 1407    Specimen: Blood from Arm, Right Updated: 02/04/22 1415     Blood Culture No growth at 5 days    Blood Culture - Blood, Arm, Left [[id number]]  (Normal) Collected: 01/30/22 1407    Specimen: Blood from Arm, Left Updated: 02/04/22 1415     Blood Culture No growth at 5 days    Renal Function Panel [[id number]]  (Abnormal) Collected: 02/04/22 0541    Specimen: Blood Updated: 02/04/22 0718     Glucose 94 mg/dL      BUN 21 mg/dL      Creatinine 7.04 mg/dL      Sodium 138 mmol/L      Potassium 3.9 mmol/L      Chloride 101 mmol/L      CO2 26.0 mmol/L      Calcium 8.8 mg/dL      Albumin 2.20 g/dL      Phosphorus 3.2 mg/dL      Anion Gap 11.0 mmol/L      BUN/Creatinine Ratio 3.0     eGFR Non African Amer 6 mL/min/1.73       Comment: <15 Indicative of kidney failure.        eGFR   Amer --     Comment: <15 Indicative of kidney failure.       Narrative:      GFR Normal >60  Chronic Kidney Disease <60  Kidney Failure <15      CBC & Differential [[id number]]  (Abnormal) Collected: 02/04/22 0541    Specimen: Blood Updated: 02/04/22 0711    Narrative:      The following orders were created for panel order CBC & Differential.  Procedure                               Abnormality         Status                     ---------                               -----------         ------                     CBC Auto Differential[[id number]]        Abnormal            Final result                 Please view results for these tests on the individual orders.    CBC Auto Differential [[id number]]  (Abnormal) Collected: 02/04/22 0541    Specimen: Blood Updated: 02/04/22 0711     WBC 5.84 10*3/mm3      RBC 3.24 10*6/mm3      Hemoglobin 9.9 g/dL      Hematocrit 29.3 %      MCV 90.4 fL      MCH 30.6 pg      MCHC 33.8 g/dL      RDW 17.0 %      RDW-SD 55.5 fl      MPV 10.3 fL      Platelets 295 10*3/mm3      Neutrophil % 72.9 %      Lymphocyte % 13.0 %      Monocyte % 7.0 %      Eosinophil % 4.1 %      Basophil % 0.9 %      Immature Grans % 2.1 %      Neutrophils, Absolute 4.26 10*3/mm3      Lymphocytes, Absolute 0.76 10*3/mm3      Monocytes, Absolute 0.41 10*3/mm3      Eosinophils, Absolute 0.24 10*3/mm3      Basophils, Absolute 0.05 10*3/mm3      Immature Grans, Absolute 0.12 10*3/mm3      nRBC 0.0 /100 WBC                  Imaging Results (All)     Procedure Component Value Units Date/Time    XR Chest 1 View [[id number]] Collected: 01/30/22 1248     Updated: 01/30/22 1253    Narrative:      XR CHEST 1 VW-     HISTORY: Female who is 76 years-old,  weakness     TECHNIQUE: Frontal view of the chest     COMPARISON: 1/10/2022     FINDINGS: Heart size is normal. Aorta is tortuous. Pulmonary vasculature  is unremarkable. No focal pulmonary consolidation, pleural  effusion, or  pneumothorax. Old bilateral rib fractures are apparent. No acute osseous  process.       Impression:      No focal pulmonary consolidation. Tortuous aorta. Follow-up  as clinically indicated.     This report was finalized on 1/30/2022 12:50 PM by Dr. Williams Urrutia M.D.             Current Facility-Administered Medications:   •  calcitriol (ROCALTROL) capsule 0.25 mcg, 0.25 mcg, Oral, Daily, Matthew Mendoza MD, 0.25 mcg at 02/04/22 0823  •  cholecalciferol (VITAMIN D3) tablet 1,000 Units, 1,000 Units, Oral, Daily, Matthew Mendoza MD, 1,000 Units at 02/04/22 0823  •  famotidine (PEPCID) tablet 20 mg, 20 mg, Oral, BID, Matthew Mendoza MD, 20 mg at 02/04/22 0823  •  levothyroxine (SYNTHROID, LEVOTHROID) tablet 25 mcg, 25 mcg, Oral, Q AM, Matthew Mendoza MD, 25 mcg at 02/04/22 0607  •  potassium chloride (K-DUR,KLOR-CON) ER tablet 20 mEq, 20 mEq, Oral, BID With Meals, Matthew Mendoza MD, 20 mEq at 02/04/22 0823  •  sevelamer (RENVELA) tablet 800 mg, 800 mg, Oral, TID With Meals, Matthew Mendoza MD, 800 mg at 02/04/22 1225  •  sucralfate (CARAFATE) tablet 1 g, 1 g, Oral, 4x Daily AC & at Bedtime, Matthew Mendoza MD, 1 g at 02/04/22 1225  •  vancomycin (VANCOCIN) capsule 125 mg, 125 mg, Oral, Q6H, CiroJason MD, 125 mg at 02/04/22 1225    ASSESSMENT  Acute C. difficile colitis  End-stage renal disease on peritoneal dialysis  Hypertension  Hyperlipidemia  Hypothyroidism  Chronic anemia  Gastroesophageal reflux disease    PLAN  Discharge home on oral vancomycin for total of 10 days  Discharge summary dictated    Matthew Mendoza MD  2/4/2022  14:42 EST    I wore protective equipment throughout this patient encounter including a face mask, gloves, and protective eyewear.  Hand hygiene was performed before donning protective equipment and after removal when leaving the room.

## 2022-02-04 NOTE — DISCHARGE SUMMARY
Discharge summary    Date of admission 1/30/2022  Date of discharge 2/5/2022    Final diagnosis  Acute C. difficile colitis  End-stage renal disease on peritoneal dialysis  Hypertension  Hyperlipidemia  Hypothyroidism  Chronic anemia  Gastroesophageal reflux disease    Discharge medications    Current Facility-Administered Medications:   •  calcitriol (ROCALTROL) capsule 0.25 mcg, 0.25 mcg, Oral, Daily, Matthew Mendoza MD, 0.25 mcg at 02/04/22 0823  •  cholecalciferol (VITAMIN D3) tablet 1,000 Units, 1,000 Units, Oral, Daily, Matthew Mendoza MD, 1,000 Units at 02/04/22 0823  •  famotidine (PEPCID) tablet 20 mg, 20 mg, Oral, BID, Matthew Mendoza MD, 20 mg at 02/04/22 0823  •  levothyroxine (SYNTHROID, LEVOTHROID) tablet 25 mcg, 25 mcg, Oral, Q AM, Matthew Mendoza MD, 25 mcg at 02/04/22 0607  •  potassium chloride (K-DUR,KLOR-CON) ER tablet 20 mEq, 20 mEq, Oral, BID With Meals, Matthew Mendoza MD, 20 mEq at 02/04/22 0823  •  sevelamer (RENVELA) tablet 800 mg, 800 mg, Oral, TID With Meals, Matthew Mendoza MD, 800 mg at 02/04/22 1225  •  sucralfate (CARAFATE) tablet 1 g, 1 g, Oral, 4x Daily AC & at Bedtime, Matthew Mendoza MD, 1 g at 02/04/22 1225  •  vancomycin (VANCOCIN) capsule 125 mg, 125 mg, Oral, Q6H, Jason Tanner MD, 125 mg at 02/04/22 1225     Consults obtained  Infectious disease  Nephrology    Procedures  None    Hospital course  76-year-old white female with history of end-stage renal disease on peritoneal dialysis hypertension hypothyroidism hyperlipidemia chronic anemia and gastroesophageal reflux disease who is well-known to our service admitted through emergency room with diarrhea and generalized weakness.  Patient evaluated in ER admit for management.  Patient admitted her stool studies showed that she has acute C. difficile colitis started on IV Flagyl and oral vancomycin.  Patient followed by infectious disease and nephrology followed for peritoneal dialysis.  Patient responded to the treatment and has no more  symptoms and will finish oral vancomycin at home.  Patient walked more than 160 feet without any problem.  Patient wants to go home.    Discharge diet regular    Activity as tolerated    Medication as above    Follow-up with primary doctor in 1 week and follow-up with nephrology and infectious disease per the instruction and keep peritoneal dialysis per nephrology instructions.    BENITO SCHWARTZ MD

## 2022-02-04 NOTE — PROGRESS NOTES
[facility] Progress Note      Patient Name: Fany Todd  : 1945  MRN: [id number]  Primary Care Physician:  Lilibeth Cota MD  Date of admission: 2022    Subjective     Interval History:   Follow-up end-stage renal disease on peritoneal dialysis  Patient is starting to ambulate with physical therapy her diarrhea is significantly improved, complaining of being weak, no chest pain or shortness of air, no orthopnea or PND, no nausea or vomiting, no abdominal pain, no lightheadedness, tolerating her peritoneal dialysis without any difficulty    Review of Systems:   As noted above    Objective     Vitals:   Temp:  [97.5 °F (36.4 °C)-99 °F (37.2 °C)] 98.6 °F (37 °C)  Heart Rate:  [92-95] 95  Resp:  [16-18] 16  BP: (107-116)/(63-85) 107/63    Intake/Output Summary (Last 24 hours) at 2022 1126  Last data filed at 2022 0915  Gross per 24 hour   Intake 8430 ml   Output 9200 ml   Net -770 ml       Physical Exam:    General Appearance: alert, oriented x 3, no acute distress, appears to be chronically ill and frail  Skin: warm and dry  HEENT: oral mucosa normal, nonicteric sclera  Neck: supple, no JVD  Lungs: CTA, unlabored breathing effort  Heart: RRR, normal S1 and S2, no S3, no rub  Abdomen: soft, nontender, nondistended, normoactive bowels, PD catheter in place exit site is clean and no tunnel tenderness  : no palpable bladder  Extremities: no edema, cyanosis or clubbing  Neuro: normal speech and mental status     Scheduled Meds:     calcitriol, 0.25 mcg, Oral, Daily  cholecalciferol, 1,000 Units, Oral, Daily  famotidine, 20 mg, Oral, BID  levothyroxine, 25 mcg, Oral, Q AM  potassium chloride, 20 mEq, Oral, BID With Meals  sevelamer, 800 mg, Oral, TID With Meals  sucralfate, 1 g, Oral, 4x Daily AC & at Bedtime  vancomycin, 125 mg, Oral, Q6H      IV Meds:        Results Reviewed:   I have personally reviewed the results from the time of this admission to 2022  11:26 EST     Results from last 7 days   Lab Units 02/04/22  0541 02/03/22  0613 02/02/22  0704 01/31/22  1303 01/30/22  1302   SODIUM mmol/L 138 136 136   < > 139   POTASSIUM mmol/L 3.9 3.6 3.2*   < > 2.5*   CHLORIDE mmol/L 101 99 100   < > 95*   CO2 mmol/L 26.0 25.2 24.5   < > 24.3   BUN mg/dL 21 27* 32*   < > 28*   CREATININE mg/dL 7.04* 7.08* 7.72*   < > 9.72*   CALCIUM mg/dL 8.8 8.2* 8.0*   < > 7.9*   BILIRUBIN mg/dL  --   --   --   --  0.7   ALK PHOS U/L  --   --   --   --  139*   ALT (SGPT) U/L  --   --   --   --  13   AST (SGOT) U/L  --   --   --   --  15   GLUCOSE mg/dL 94 96 111*   < > 113*    < > = values in this interval not displayed.       Estimated Creatinine Clearance: 5 mL/min (A) (by C-G formula based on SCr of 7.04 mg/dL (H)).    Results from last 7 days   Lab Units 02/04/22  0541 02/02/22  0704 02/01/22  0606 01/31/22  1303 01/31/22  1303 01/30/22  1302   MAGNESIUM mg/dL  --   --  2.9*  --  2.7* 1.5*   PHOSPHORUS mg/dL 3.2 3.3 3.5   < > 4.5  --     < > = values in this interval not displayed.             Results from last 7 days   Lab Units 02/04/22  0541 02/03/22  0613 02/02/22  0705 02/01/22  0606 01/31/22  1303   WBC 10*3/mm3 5.84 7.50 9.19 15.03* 19.39*   HEMOGLOBIN g/dL 9.9* 9.0* 8.8* 8.9* 9.6*   PLATELETS 10*3/mm3 295 320 324 325 343       Results from last 7 days   Lab Units 01/30/22  1302   INR  1.26*       Assessment / Plan     ASSESSMENT:  1.  End-stage renal disease on peritoneal dialysis the etiology is progressive renal atrophy, the patient is tolerating her peritoneal dialysis without any problems, her PD fluid were clear no significant cell count noted, currently on CAPD 2 L exchanges 1.5% solution.  Electrolytes within acceptable range and patient appears to be euvolemic  2.  C. difficile colitis, treated with oral vancomycin and IV metronidazole  3.  Hypokalemia related to severe diarrhea, potassium improved with supplement except to 3.6 today   4.  Anemia of chronic kidney  disease, hemoglobin today is 9.9, treated with long-acting PAOLO as an outpatient  5.  Recent duodenal ulcer which was bleeding and severe anemia requiring multiple transfusion.   6.  Secondary hyperparathyroidism metabolic bone disease      PLAN:  1.  Continue the same treatment, continue CAPD  2.  Continue daily KCl 20 mEq daily  3.  If ambulating adequately she could be discharged from the renal standpoint and she will resume her CCPD    I discussed the case with the patient and she voiced good understanding    Thank you for involving us in the care of Fany Todd.  Please feel free to call with any questions.    Carlos Schwab MD  02/04/22  11:26 [facility]    [facility]  [phone]      Much of this encounter note is an electronic transcription/translation of spoken language to printed text. The electronic translation of spoken language may permit erroneous, or at times, nonsensical words or phrases to be inadvertently transcribed; Although I have reviewed the note for such errors, some may still exist.

## 2022-02-05 VITALS
RESPIRATION RATE: 16 BRPM | TEMPERATURE: 97.8 F | OXYGEN SATURATION: 97 % | HEIGHT: 63 IN | HEART RATE: 90 BPM | SYSTOLIC BLOOD PRESSURE: 95 MMHG | DIASTOLIC BLOOD PRESSURE: 56 MMHG | WEIGHT: 101.7 LBS | BODY MASS INDEX: 18.02 KG/M2

## 2022-02-05 RX ADMIN — VANCOMYCIN HYDROCHLORIDE 125 MG: 125 CAPSULE ORAL at 02:17

## 2022-02-05 RX ADMIN — SUCRALFATE 1 G: 1 TABLET ORAL at 09:27

## 2022-02-05 RX ADMIN — SEVELAMER CARBONATE 800 MG: 800 TABLET, FILM COATED ORAL at 09:27

## 2022-02-05 RX ADMIN — VANCOMYCIN HYDROCHLORIDE 125 MG: 125 CAPSULE ORAL at 06:47

## 2022-02-05 RX ADMIN — Medication 1000 UNITS: at 09:27

## 2022-02-05 RX ADMIN — VANCOMYCIN HYDROCHLORIDE 125 MG: 125 CAPSULE ORAL at 11:27

## 2022-02-05 RX ADMIN — POTASSIUM CHLORIDE 20 MEQ: 10 TABLET, EXTENDED RELEASE ORAL at 09:27

## 2022-02-05 RX ADMIN — CALCITRIOL 0.25 MCG: 0.25 CAPSULE ORAL at 09:27

## 2022-02-05 RX ADMIN — LEVOTHYROXINE SODIUM 25 MCG: 0.03 TABLET ORAL at 06:04

## 2022-02-05 RX ADMIN — SEVELAMER CARBONATE 800 MG: 800 TABLET, FILM COATED ORAL at 11:27

## 2022-02-05 RX ADMIN — FAMOTIDINE 20 MG: 20 TABLET, FILM COATED ORAL at 09:27

## 2022-02-05 RX ADMIN — SUCRALFATE 1 G: 1 TABLET ORAL at 11:27

## 2022-02-05 NOTE — PROGRESS NOTES
"Daily progress note      2022    Patient Identification:    Name: Fany Todd  Age: 76 y.o.  Sex: female  :  1945  MRN: [id number]                       Primary Care Physician: Lilibeth Cota MD    Chief Complaint:    Doing same  No new complaints  Ambulate more than 160 feet   Denies any abdominal pain nausea vomiting diarrhea    History of Present Illness:   Patient is a 76-year-old female, with history of end-stage renal disease on peritoneal dialysis, hyperlipidemia, hypertension.  Patient was recently admitted to the hospital, about 2 weeks ago due to nonbleeding large duodenal ulcer and acute blood loss anemia.  Patient was discharged home on pantoprazole twice a day and Carafate.  At that time, patient was also having generalized weakness.  Since discharge from the hospital, she has not improved much in terms of her generalized weakness unluckily has been progressively getting worse.  She has developed also fatigue as well as shortness of breath during the last day prior to admission.  Patient also stated she has been having diarrhea about 1-2 stools per day for the last few weeks.  Denies admission, patient felt really weak, she was not able to get out of bed.  Patient was brought to the emergency room, in the ER, patient was found to have: Creatinine 9.72, sodium 139, potassium 2.5, WBC 19.18, hemoglobin 11, D 32.8, procalcitonin elevated 1.91, lactate 2.5.  Chest ray show no focal pulmonary consolidation.  Patient was given potassium as well as magnesium in the emergency room, patient was admitted to the hospital for further management.    Review of Systems  Unremarkable except generalized weakness    Exam:  Blood pressure 95/56, pulse 90, temperature 97.8 °F (36.6 °C), temperature source Oral, resp. rate 16, height 160 cm (63\"), weight 46.1 kg (101 lb 11.2 oz), SpO2 97 %.    General: Alert, oriented x 3. Cooperative, no distress, appears stated age  HEENT:    Head: Normocephalic, " without obvious abnormality, atraumatic  Eyes: EOM are normal. Pupils are equal, round, and reactive to light.   Oropharynx: Mucosa and tongue dry  Neck: Supple, symmetrical, trachea midline, no adenopathy;              thyroid:  no enlargement/tenderness/nodules;              no carotid bruit or JVD  Cardiovascular: Normal rate, regular rhythm and intact distal pulses.              Exam reveals no gallop and no friction rub. No murmur heard  Chest wall: No tenderness or deformity  Pulmonary: Clear to auscultation bilaterally, respirations unlabored.               No rhonchi, wheezing or rales.   Abdominal: Soft, nontender, bowel sounds active all four quadrants,     no masses, no hepatomegaly, no splenomegaly.  Peritoneal catheter site looks clean.  Extremities: Normal, atraumatic, no cyanosis or edema  Pulses: 2 + symmetric all extremities  Neurological: Patient is alert and oriented to person, place, and time.  No new focal sensorimotor deficit.  Skin: Skin color, texture, normal. Turgor is decreased. No rashes or lesions      Data Review:  Lab Results (last 24 hours)     Procedure Component Value Units Date/Time    Blood Culture - Blood, Arm, Right [[id number]]  (Normal) Collected: 01/30/22 1407    Specimen: Blood from Arm, Right Updated: 02/04/22 1415     Blood Culture No growth at 5 days    Blood Culture - Blood, Arm, Left [[id number]]  (Normal) Collected: 01/30/22 1407    Specimen: Blood from Arm, Left Updated: 02/04/22 1415     Blood Culture No growth at 5 days                 Imaging Results (All)     Procedure Component Value Units Date/Time    XR Chest 1 View [[id number]] Collected: 01/30/22 1248     Updated: 01/30/22 1253    Narrative:      XR CHEST 1 VW-     HISTORY: Female who is 76 years-old,  weakness     TECHNIQUE: Frontal view of the chest     COMPARISON: 1/10/2022     FINDINGS: Heart size is normal. Aorta is tortuous. Pulmonary vasculature  is unremarkable. No focal pulmonary consolidation, pleural  effusion, or  pneumothorax. Old bilateral rib fractures are apparent. No acute osseous  process.       Impression:      No focal pulmonary consolidation. Tortuous aorta. Follow-up  as clinically indicated.     This report was finalized on 1/30/2022 12:50 PM by Dr. Williams Urrutia M.D.             Current Facility-Administered Medications:   •  calcitriol (ROCALTROL) capsule 0.25 mcg, 0.25 mcg, Oral, Daily, Matthew Mendoza MD, 0.25 mcg at 02/05/22 0927  •  cholecalciferol (VITAMIN D3) tablet 1,000 Units, 1,000 Units, Oral, Daily, Matthew Mendoza MD, 1,000 Units at 02/05/22 0927  •  famotidine (PEPCID) tablet 20 mg, 20 mg, Oral, BID, Matthew Mendoza MD, 20 mg at 02/05/22 0927  •  levothyroxine (SYNTHROID, LEVOTHROID) tablet 25 mcg, 25 mcg, Oral, Q AM, Matthew Mendoza MD, 25 mcg at 02/05/22 0604  •  potassium chloride (K-DUR,KLOR-CON) ER tablet 20 mEq, 20 mEq, Oral, BID With Meals, Matthew Mendoza MD, 20 mEq at 02/05/22 0927  •  sevelamer (RENVELA) tablet 800 mg, 800 mg, Oral, TID With Meals, Matthew Mendoza MD, 800 mg at 02/05/22 1127  •  sucralfate (CARAFATE) tablet 1 g, 1 g, Oral, 4x Daily AC & at Bedtime, Matthew Mendoza MD, 1 g at 02/05/22 1127  •  vancomycin (VANCOCIN) capsule 125 mg, 125 mg, Oral, Q6H, CiroJason MD, 125 mg at 02/05/22 1127    ASSESSMENT  Acute C. difficile colitis  End-stage renal disease on peritoneal dialysis  Hypertension  Hyperlipidemia  Hypothyroidism  Chronic anemia  Gastroesophageal reflux disease    PLAN  Discharge home on oral vancomycin for total of 10 days  Discharge summary dictated    Matthew Mendoza MD  2/5/2022  11:31 EST    I wore protective equipment throughout this patient encounter including a face mask, gloves, and protective eyewear.  Hand hygiene was performed before donning protective equipment and after removal when leaving the room.

## 2022-02-05 NOTE — PROGRESS NOTES
[facility] Progress Note      Patient Name: Fany Todd  : 1945  MRN: [id number]  Primary Care Physician:  Lilibeth Cota MD  Date of admission: 2022    Subjective     Interval History:   Follow-up end-stage renal disease on peritoneal dialysis  Patient is ambulating without any difficulties, her diarrhea is slowing down more solid.  No nausea or vomiting, no abdominal pain, no lightheadedness.  She was discharged yesterday but did not get home because she did not have assistance to get into the house hopefully she will be going home today      Review of Systems:   As noted above    Objective     Vitals:   Temp:  [97.8 °F (36.6 °C)-98.4 °F (36.9 °C)] 97.8 °F (36.6 °C)  Heart Rate:  [] 90  Resp:  [14-18] 16  BP: ()/(56-68) 95/56    Intake/Output Summary (Last 24 hours) at 2022 1003  Last data filed at 2022 0923  Gross per 24 hour   Intake 8480 ml   Output 8000 ml   Net 480 ml       Physical Exam:    General Appearance: alert, oriented x 3, no acute distress, appears to be chronically ill and frail  Skin: warm and dry  HEENT: oral mucosa normal, nonicteric sclera  Neck: supple, no JVD  Lungs: CTA, unlabored breathing effort  Heart: RRR, normal S1 and S2, no S3, no rub  Abdomen: soft, nontender, nondistended, normoactive bowels, PD catheter in place exit site is clean and no tunnel tenderness  : no palpable bladder  Extremities: no edema, cyanosis or clubbing  Neuro: normal speech and mental status     Scheduled Meds:     calcitriol, 0.25 mcg, Oral, Daily  cholecalciferol, 1,000 Units, Oral, Daily  famotidine, 20 mg, Oral, BID  levothyroxine, 25 mcg, Oral, Q AM  potassium chloride, 20 mEq, Oral, BID With Meals  sevelamer, 800 mg, Oral, TID With Meals  sucralfate, 1 g, Oral, 4x Daily AC & at Bedtime  vancomycin, 125 mg, Oral, Q6H      IV Meds:        Results Reviewed:   I have personally reviewed the results from the time of this admission to  2/5/2022 10:03 EST     Results from last 7 days   Lab Units 02/04/22  0541 02/03/22  0613 02/02/22  0704 01/31/22  1303 01/30/22  1302   SODIUM mmol/L 138 136 136   < > 139   POTASSIUM mmol/L 3.9 3.6 3.2*   < > 2.5*   CHLORIDE mmol/L 101 99 100   < > 95*   CO2 mmol/L 26.0 25.2 24.5   < > 24.3   BUN mg/dL 21 27* 32*   < > 28*   CREATININE mg/dL 7.04* 7.08* 7.72*   < > 9.72*   CALCIUM mg/dL 8.8 8.2* 8.0*   < > 7.9*   BILIRUBIN mg/dL  --   --   --   --  0.7   ALK PHOS U/L  --   --   --   --  139*   ALT (SGPT) U/L  --   --   --   --  13   AST (SGOT) U/L  --   --   --   --  15   GLUCOSE mg/dL 94 96 111*   < > 113*    < > = values in this interval not displayed.       Estimated Creatinine Clearance: 5 mL/min (A) (by C-G formula based on SCr of 7.04 mg/dL (H)).    Results from last 7 days   Lab Units 02/04/22  0541 02/02/22  0704 02/01/22  0606 01/31/22  1303 01/31/22  1303 01/30/22  1302   MAGNESIUM mg/dL  --   --  2.9*  --  2.7* 1.5*   PHOSPHORUS mg/dL 3.2 3.3 3.5   < > 4.5  --     < > = values in this interval not displayed.             Results from last 7 days   Lab Units 02/04/22  0541 02/03/22  0613 02/02/22  0705 02/01/22  0606 01/31/22  1303   WBC 10*3/mm3 5.84 7.50 9.19 15.03* 19.39*   HEMOGLOBIN g/dL 9.9* 9.0* 8.8* 8.9* 9.6*   PLATELETS 10*3/mm3 295 320 324 325 343       Results from last 7 days   Lab Units 01/30/22  1302   INR  1.26*       Assessment / Plan     ASSESSMENT:  1.  End-stage renal disease on peritoneal dialysis the etiology is progressive renal atrophy, the patient is tolerating her peritoneal dialysis without any problems, her PD fluid were clear no significant cell count noted, currently on CAPD 2 L exchanges 1.5% solution.  Electrolytes within acceptable range and patient appears to be euvolemic  2.  C. difficile colitis, treated with oral vancomycin and IV metronidazole  3.  Hypokalemia related to severe diarrhea, potassium improved with supplement, potassium is over 3.6  4.  Anemia of chronic  kidney disease, hemoglobin yesterday was 9.9, treated with long-acting PAOLO as an outpatient  5.  Recent duodenal ulcer which was bleeding and severe anemia requiring multiple transfusion.   6.  Secondary hyperparathyroidism metabolic bone disease      PLAN:  1.  Patient will resume her CCPD after discharge  2.  Continue daily KCl 20 mEq daily  3.  I will follow the patient closely as an outpatient at [facility] home therapy clinic    I discussed the case with the patient and she voiced good understanding    Thank you for involving us in the care of Fany Todd.  Please feel free to call with any questions.    Carlos Schwab MD  02/05/22  10:03 [facility]    [facility]  [phone]      Much of this encounter note is an electronic transcription/translation of spoken language to printed text. The electronic translation of spoken language may permit erroneous, or at times, nonsensical words or phrases to be inadvertently transcribed; Although I have reviewed the note for such errors, some may still exist.

## 2022-02-05 NOTE — PROGRESS NOTES
"  Infectious Diseases Progress Note    Jason Tanner MD     [facility]  [place]: 6 days  Patient Identification:  Name: Fany Todd  Age: 76 y.o.  Sex: female  :  1945  MRN: [id number]         Primary Care Physician: Lilibeth Cota MD            Subjective: doing well and wants to go home  Interval History: See consultation note.    Objective:    Scheduled Meds:calcitriol, 0.25 mcg, Oral, Daily  cholecalciferol, 1,000 Units, Oral, Daily  famotidine, 20 mg, Oral, BID  levothyroxine, 25 mcg, Oral, Q AM  potassium chloride, 20 mEq, Oral, BID With Meals  sevelamer, 800 mg, Oral, TID With Meals  sucralfate, 1 g, Oral, 4x Daily AC & at Bedtime  vancomycin, 125 mg, Oral, Q6H      Continuous Infusions:     Vital signs in last 24 hours:  Temp:  [97.8 °F (36.6 °C)-98.4 °F (36.9 °C)] 97.8 °F (36.6 °C)  Heart Rate:  [] 90  Resp:  [14-18] 16  BP: ()/(56-68) 95/56    Intake/Output:    Intake/Output Summary (Last 24 hours) at 2022 1050  Last data filed at 2022 0923  Gross per 24 hour   Intake 8480 ml   Output 8000 ml   Net 480 ml       Exam:  BP 95/56 (BP Location: Right arm, Patient Position: Lying)   Pulse 90   Temp 97.8 °F (36.6 °C) (Oral)   Resp 16   Ht 160 cm (63\")   Wt 46.1 kg (101 lb 11.2 oz)   SpO2 97%   BMI 18.02 kg/m²   Patient is examined using the personal protective equipment as per guidelines from infection control for this particular patient as enacted.  Hand washing was performed before and after patient interaction.  General Appearance:    Alert, cooperative, no distress, AAOx3                          Head:    Normocephalic, without obvious abnormality, atraumatic                           Eyes:    PERRL, conjunctivae/corneas clear, EOM's intact, both eyes                         Throat:   Lips, tongue, gums normal; oral mucosa pink and moist                           Neck:   Supple, symmetrical, trachea midline, no JVD                         Lungs:    Clear " to auscultation bilaterally, respirations unlabored                 Chest Wall:    No tenderness or deformity                          Heart:    Regular rate and rhythm, S1 and S2 normal                  Abdomen:   Soft no guarding rigidity or rebound noted generalized tenderness.  PD catheter in place with clear dialysate fluid.                 Extremities:   Extremities normal, atraumatic, no cyanosis or edema                        Pulses:   Pulses palpable in all extremities                            Skin:   Skin is warm and dry,  no rashes or palpable lesions                  Neurologic: Grossly nonfocal.       Data Review:    I reviewed the patient's new clinical results.  Results from last 7 days   Lab Units 02/04/22  0541 02/03/22  0613 02/02/22  0705 02/01/22  0606 01/31/22  1303 01/30/22  1302   WBC 10*3/mm3 5.84 7.50 9.19 15.03* 19.39* 19.19*   HEMOGLOBIN g/dL 9.9* 9.0* 8.8* 8.9* 9.6* 11.0*   PLATELETS 10*3/mm3 295 320 324 325 343 381     Results from last 7 days   Lab Units 02/04/22  0541 02/03/22  0613 02/02/22  0704 02/01/22  0606 01/31/22  1303 01/30/22  1302   SODIUM mmol/L 138 136 136 135* 134* 139   POTASSIUM mmol/L 3.9 3.6 3.2* 3.3* 3.2* 2.5*   CHLORIDE mmol/L 101 99 100 99 99 95*   CO2 mmol/L 26.0 25.2 24.5 21.6* 21.0* 24.3   BUN mg/dL 21 27* 32* 37* 37* 28*   CREATININE mg/dL 7.04* 7.08* 7.72* 9.44* 10.23* 9.72*   CALCIUM mg/dL 8.8 8.2* 8.0* 8.1* 7.8* 7.9*   GLUCOSE mg/dL 94 96 111* 110* 98 113*     Microbiology Results (last 10 days)     Procedure Component Value - Date/Time    Clostridium Difficile Toxin - Stool, Per Rectum [[id number]]  (Abnormal) Collected: 01/30/22 1838    Lab Status: Final result Specimen: Stool from Per Rectum Updated: 01/30/22 2232    Narrative:      The following orders were created for panel order Clostridium Difficile Toxin - Stool, Per Rectum.  Procedure                               Abnormality         Status                     ---------                                -----------         ------                     Clostridium Difficile To...[[id number]]  Abnormal            Final result                 Please view results for these tests on the individual orders.    Clostridium Difficile Toxin, PCR - Stool, Per Rectum [[id number]]  (Abnormal) Collected: 01/30/22 1838    Lab Status: Final result Specimen: Stool from Per Rectum Updated: 01/30/22 2232     C. Difficile Toxins by PCR Positive    COVID PRE-OP / PRE-PROCEDURE SCREENING ORDER (NO ISOLATION) - Swab, Nasopharynx [[id number]]  (Normal) Collected: 01/30/22 1527    Lab Status: Final result Specimen: Swab from Nasopharynx Updated: 01/30/22 1612    Narrative:      The following orders were created for panel order COVID PRE-OP / PRE-PROCEDURE SCREENING ORDER (NO ISOLATION) - Swab, Nasopharynx.  Procedure                               Abnormality         Status                     ---------                               -----------         ------                     COVID-19,[facility] IN-HOUSE...[[id number]]  Normal              Final result                 Please view results for these tests on the individual orders.    COVID-19,[facility] IN-HOUSE CEPHEID/JAM NP SWAB IN TRANSPORT MEDIA 8-12 HR TAT - Swab, Nasopharynx [[id number]]  (Normal) Collected: 01/30/22 1527    Lab Status: Final result Specimen: Swab from Nasopharynx Updated: 01/30/22 1612     COVID19 Not Detected    Narrative:      Fact sheet for providers: https://www.fda.gov/media/355044/download    Fact sheet for patients: https://www.fda.gov/media/829706/download    Test performed by PCR.    Body Fluid Culture - Body Fluid, Peritoneum [[id number]] Collected: 01/30/22 1526    Lab Status: Final result Specimen: Body Fluid from Peritoneum Updated: 02/02/22 0609     Body Fluid Culture No growth at 3 days     Gram Stain No WBCs or organisms seen    Blood Culture - Blood, Arm, Left [[id number]]  (Normal) Collected: 01/30/22 1407    Lab Status: Final result Specimen: Blood from Arm,  Left Updated: 02/04/22 1415     Blood Culture No growth at 5 days    Blood Culture - Blood, Arm, Right [[id number]]  (Normal) Collected: 01/30/22 1407    Lab Status: Final result Specimen: Blood from Arm, Right Updated: 02/04/22 1415     Blood Culture No growth at 5 days            Assessment:    Generalized weakness  1-systemic sepsis with associated volume depletion in the setting of diarrhea recent hospitalization and recent GI bleed underlying etiology for sepsis could include:              -C. difficile colitis exacerbated by recent use of proton pump inhibitors and prior history of antibiotic use              -Intra-abdominal process with potential perforation of duodenum and evolving intra-abdominal abscesses the patient does not have any specific symptoms and initial PD fluid analysis argues against peritoneal process              -Evolving bacteremic sepsis of unclear etiology  2-recent GI bleed due to duodenal ulcer  3-end-stage renal disease on peritoneal dialysis  4-other diagnosis per primary team.     Recommendations/Discussions:  · See my recommendations and discussions on 2/2/2022.  · She is significantly improved and her white blood cell counts are better and her appetite is improving and she denies any symptoms of ileus.  · Continue oral vancomycin for total of 10 days as planned with emphasis on avoiding broad-spectrum antibiotic treatment down the road unless it is absolutely necessary.  Jason Tanner MD  2/5/2022  10:50 EST    Much of this encounter note is an electronic transcription/translation of spoken language to printed text. The electronic translation of spoken language may permit erroneous, or at times, nonsensical words or phrases to be inadvertently transcribed; Although I have reviewed the note for such errors, some may still exist

## 2022-02-06 ENCOUNTER — READMISSION MANAGEMENT (OUTPATIENT)
Dept: CALL CENTER | Facility: HOSPITAL | Age: 77
End: 2022-02-06

## 2022-02-06 NOTE — CASE MANAGEMENT/SOCIAL WORK
Case Management Discharge Note      Final Note: DC'd home 2/5             Home Medical Care Coordination complete.    Service Provider Selected Services Address Phone Fax Patient Preferred    [facility]  Home Health Services [address] [place] [id number] [phone] --                Selected Continued Care - Prior Encounters Includes selections from prior encounters from 11/1/2021 to 2/5/2022    Discharged on 1/16/2022 Admission date: 1/10/2022 - Discharge disposition: Home or Self Care    Home Medical Care     Service Provider Selected Services Address Phone Fax Patient Preferred    [facility]  Home Peritoneal Dialysis [facility] [id number] [phone] --                    Transportation Services  Private: Car    Final Discharge Disposition Code: 01 - home or self-care

## 2022-02-06 NOTE — OUTREACH NOTE
Prep Survey      Responses   [facility]? [place]   Is LACE score < 7 ? No   Emergency Room discharge w/ pulse ox? No   Eligibility Readm Mgmt   Discharge diagnosis Acute C. difficile colitis   Does the patient have one of the following disease processes/diagnoses(primary or secondary)? Sepsis   Does the patient have Home health ordered? Yes   What is the Home health agency?  [facility]   Is there a DME ordered? No   Comments regarding appointments Follow up with Lilibeth Cota MD   Prep survey completed? Yes          Rowena Willis RN

## 2022-02-08 ENCOUNTER — READMISSION MANAGEMENT (OUTPATIENT)
Dept: CALL CENTER | Facility: HOSPITAL | Age: 77
End: 2022-02-08

## 2022-02-08 NOTE — OUTREACH NOTE
Sepsis Week 1 Survey      Responses   [facility] patient discharged from? [place]   Does the patient have one of the following disease processes/diagnoses(primary or secondary)? Sepsis   Week 1 attempt successful? No   Unsuccessful attempts Attempt 1          Theresa Vincent RN

## 2022-02-11 ENCOUNTER — READMISSION MANAGEMENT (OUTPATIENT)
Dept: CALL CENTER | Facility: HOSPITAL | Age: 77
End: 2022-02-11

## 2022-02-11 NOTE — OUTREACH NOTE
Sepsis Week 1 Survey      Responses   [facility] patient discharged from? [place]   Does the patient have one of the following disease processes/diagnoses(primary or secondary)? Sepsis   Week 1 attempt successful? Yes   Call start time 1153   Call end time 1157   Discharge diagnosis Acute C. difficile colitis   Is patient permission given to speak with other caregiver? Yes   List who call center can speak with sisters   Meds reviewed with patient/caregiver? Yes   Is the patient having any side effects they believe may be caused by any medication additions or changes? No   Does the patient have all medications related to this admission filled (includes all antibiotics, inhalers, nebulizers,steroids,etc.) Yes   Is the patient taking all medications as directed (includes completed medication regime)? Yes   Does the patient have a primary care provider?  Yes   Comments regarding PCP 2/21 renal f/u,  no f/u for PCP as she feels too weak but suggested telehealth appt.   [facility]   Has home health visited the patient within 72 hours of discharge? Yes   Home health comments PT/OT    Psychosocial issues? No   Comments She reports weakness and decreased stamina. her appetite is improving. 98/61 which is low for her, she reports typically 116-130/70-80   Did the patient receive a copy of their discharge instructions? Yes   Nursing interventions Reviewed instructions with patient   What is the patient's perception of their health status since discharge? Improving   Nursing interventions Nurse provided patient education   Is the patient/caregiver able to teach back Sepsis? S - Shivering,fever or very cold,  E - Extreme pain or generalized discomfort (worst ever,especially abdomen)   Nursing interventions Nurse provided reassurance to patient   Is patient/caregiver able to teach back steps to recovery at home? Set small, achievable goals for return to baseline health,   Rest and regain strength,  Talk about feelings with family/friends   Is the patient/caregiver able to teach back signs and symptoms of worsening condition: Fever,  Hyperthermia,  Rapid heart rate (>90)   If the patient is a current smoker, are they able to teach back resources for cessation? Not a smoker   Is the patient/caregiver able to teach back the hierarchy of who to call/visit for symptoms/problems? PCP, Specialist, Home health nurse, Urgent Care, ED, 911 Yes   Week 1 call completed? Yes          Samantha Harper RN

## 2022-02-21 ENCOUNTER — READMISSION MANAGEMENT (OUTPATIENT)
Dept: CALL CENTER | Facility: HOSPITAL | Age: 77
End: 2022-02-21

## 2022-02-21 NOTE — OUTREACH NOTE
Sepsis Week 2 Survey      Responses   [facility] patient discharged from? [place]   Does the patient have one of the following disease processes/diagnoses(primary or secondary)? Sepsis   Week 2 attempt successful? No   Unsuccessful attempts Attempt 1          Franci Centeno RN

## 2022-02-25 ENCOUNTER — READMISSION MANAGEMENT (OUTPATIENT)
Dept: CALL CENTER | Facility: HOSPITAL | Age: 77
End: 2022-02-25

## 2022-02-25 NOTE — OUTREACH NOTE
Sepsis Week 2 Survey      Responses   [facility] patient discharged from? [place]   Does the patient have one of the following disease processes/diagnoses(primary or secondary)? Sepsis   Week 2 attempt successful? No   Unsuccessful attempts Attempt 2          Samantha Harper RN

## 2022-03-04 ENCOUNTER — READMISSION MANAGEMENT (OUTPATIENT)
Dept: CALL CENTER | Facility: HOSPITAL | Age: 77
End: 2022-03-04

## 2022-03-04 NOTE — OUTREACH NOTE
"Sepsis Week 3 Survey      Responses   [facility] patient discharged from? [place]   Does the patient have one of the following disease processes/diagnoses(primary or secondary)? Sepsis   Week 3 attempt successful? Yes   Call start time 1502   Call end time 1507   Discharge diagnosis Acute C. difficile colitis   Is patient permission given to speak with other caregiver? Yes   Person spoke with today (if not patient) and relationship Kim Delacruz reviewed with patient/caregiver? Yes   Is the patient having any side effects they believe may be caused by any medication additions or changes? No   Does the patient have all medications related to this admission filled (includes all antibiotics, inhalers, nebulizers,steroids,etc.) Yes   Is the patient taking all medications as directed (includes completed medication regime)? Yes   Does the patient have a primary care provider?  Yes   Does the patient have an appointment with their PCP within 7 days of discharge? Yes   Has the patient kept scheduled appointments due by today? Yes   What is the Home health agency?  [facility]   Has home health visited the patient within 72 hours of discharge? Yes   Home health comments PT/OT    Psychosocial issues? No   Comments Sister says,\" BP up a little today, not much. She is eating better, moving around more.\"   Did the patient receive a copy of their discharge instructions? Yes   Nursing interventions Reviewed instructions with patient   What is the patient's perception of their health status since discharge? Improving   Nursing interventions Nurse provided patient education   If the patient is a current smoker, are they able to teach back resources for cessation? Not a smoker   Is the patient/caregiver able to teach back the hierarchy of who to call/visit for symptoms/problems? PCP, Specialist, Home health nurse, Urgent Care, ED, 911 Yes   Additional teach back comments Sister says dialysis is going well. "   Week 3 call completed? Yes   Wrap up additional comments Improving.          Cynthia Pan RN

## 2022-03-15 ENCOUNTER — READMISSION MANAGEMENT (OUTPATIENT)
Dept: CALL CENTER | Facility: HOSPITAL | Age: 77
End: 2022-03-15

## 2022-03-15 NOTE — OUTREACH NOTE
Sepsis Week 4 Survey    Flowsheet Row Responses   [facility] patient discharged from? [place]   Does the patient have one of the following disease processes/diagnoses(primary or secondary)? Sepsis   Week 4 attempt successful? Yes   Call start time 1443   Call end time 1445   Discharge diagnosis Acute C. difficile colitis   Person spoke with today (if not patient) and relationship Kim   Jayme reviewed with patient/caregiver? Yes   Is the patient taking all medications as directed (includes completed medication regime)? Yes   Is the patient still receiving Home Health Services? No   Psychosocial issues? No   Comments Eating better and B/P improved. Dialysis 3x week.   What is the patient's perception of their health status since discharge? Improving   Week 4 call completed? Yes   Would the patient like one additional call? No   Graduated Yes   Is the patient interested in additional calls from an ambulatory ?  NOTE:  applies to high risk patients requiring additional follow-up. No   Did the patient feel the follow up calls were helpful during their recovery period? Yes   Wrap up additional comments Sister says she is doing really well. Instructed on Nurse Call Center          JONES PATTERSON - Registered Nurse

## 2022-03-30 ENCOUNTER — PREP FOR SURGERY (OUTPATIENT)
Dept: OTHER | Facility: HOSPITAL | Age: 77
End: 2022-03-30

## 2022-03-30 ENCOUNTER — TELEPHONE (OUTPATIENT)
Dept: SURGERY | Facility: CLINIC | Age: 77
End: 2022-03-30

## 2022-03-30 DIAGNOSIS — Z99.2 ESRD ON PERITONEAL DIALYSIS: Primary | ICD-10-CM

## 2022-03-30 DIAGNOSIS — N18.6 ESRD ON PERITONEAL DIALYSIS: Primary | ICD-10-CM

## 2022-03-30 NOTE — TELEPHONE ENCOUNTER
Dialysis Center called and stated that pt is no longer needing her PD Cath.    Would you need to see pt in office prior?  Or ok to place orders for surgery sched?

## 2022-04-28 ENCOUNTER — PRE-ADMISSION TESTING (OUTPATIENT)
Dept: PREADMISSION TESTING | Facility: HOSPITAL | Age: 77
End: 2022-04-28

## 2022-04-28 VITALS
BODY MASS INDEX: 18.77 KG/M2 | SYSTOLIC BLOOD PRESSURE: 139 MMHG | TEMPERATURE: 98.4 F | DIASTOLIC BLOOD PRESSURE: 63 MMHG | RESPIRATION RATE: 16 BRPM | WEIGHT: 105.9 LBS | OXYGEN SATURATION: 98 % | HEART RATE: 95 BPM | HEIGHT: 63 IN

## 2022-04-28 LAB
ANION GAP SERPL CALCULATED.3IONS-SCNC: 12 MMOL/L (ref 5–15)
BUN SERPL-MCNC: 13 MG/DL (ref 8–23)
BUN/CREAT SERPL: 2.9 (ref 7–25)
CALCIUM SPEC-SCNC: 8.9 MG/DL (ref 8.6–10.5)
CHLORIDE SERPL-SCNC: 100 MMOL/L (ref 98–107)
CO2 SERPL-SCNC: 31 MMOL/L (ref 22–29)
CREAT SERPL-MCNC: 4.47 MG/DL (ref 0.57–1)
DEPRECATED RDW RBC AUTO: 52.9 FL (ref 37–54)
EGFRCR SERPLBLD CKD-EPI 2021: 9.7 ML/MIN/1.73
ERYTHROCYTE [DISTWIDTH] IN BLOOD BY AUTOMATED COUNT: 15.3 % (ref 12.3–15.4)
GLUCOSE SERPL-MCNC: 116 MG/DL (ref 65–99)
HCT VFR BLD AUTO: 23.6 % (ref 34–46.6)
HGB BLD-MCNC: 7.7 G/DL (ref 12–15.9)
MCH RBC QN AUTO: 30.6 PG (ref 26.6–33)
MCHC RBC AUTO-ENTMCNC: 32.6 G/DL (ref 31.5–35.7)
MCV RBC AUTO: 93.7 FL (ref 79–97)
PLATELET # BLD AUTO: 287 10*3/MM3 (ref 140–450)
PMV BLD AUTO: 9.4 FL (ref 6–12)
POTASSIUM SERPL-SCNC: 3.8 MMOL/L (ref 3.5–5.2)
RBC # BLD AUTO: 2.52 10*6/MM3 (ref 3.77–5.28)
SODIUM SERPL-SCNC: 143 MMOL/L (ref 136–145)
WBC NRBC COR # BLD: 7.02 10*3/MM3 (ref 3.4–10.8)

## 2022-04-28 PROCEDURE — 85027 COMPLETE CBC AUTOMATED: CPT

## 2022-04-28 PROCEDURE — 80048 BASIC METABOLIC PNL TOTAL CA: CPT

## 2022-04-28 PROCEDURE — 36415 COLL VENOUS BLD VENIPUNCTURE: CPT

## 2022-04-28 RX ORDER — SUCRALFATE 1 G/1
1 TABLET ORAL 4 TIMES DAILY
COMMUNITY
End: 2022-06-06

## 2022-04-28 RX ORDER — AMLODIPINE BESYLATE 10 MG/1
10 TABLET ORAL DAILY
COMMUNITY

## 2022-04-28 RX ORDER — PANTOPRAZOLE SODIUM 40 MG/1
40 TABLET, DELAYED RELEASE ORAL DAILY
COMMUNITY
End: 2022-06-06

## 2022-04-28 RX ORDER — CHLORHEXIDINE GLUCONATE 500 MG/1
CLOTH TOPICAL
COMMUNITY
End: 2022-05-03 | Stop reason: HOSPADM

## 2022-04-28 RX ORDER — ATORVASTATIN CALCIUM 20 MG/1
20 TABLET, FILM COATED ORAL DAILY
COMMUNITY

## 2022-04-30 ENCOUNTER — LAB (OUTPATIENT)
Dept: LAB | Facility: HOSPITAL | Age: 77
End: 2022-04-30

## 2022-04-30 DIAGNOSIS — N18.6 ESRD ON PERITONEAL DIALYSIS: ICD-10-CM

## 2022-04-30 DIAGNOSIS — Z99.2 ESRD ON PERITONEAL DIALYSIS: ICD-10-CM

## 2022-04-30 LAB — SARS-COV-2 ORF1AB RESP QL NAA+PROBE: NOT DETECTED

## 2022-04-30 PROCEDURE — U0004 COV-19 TEST NON-CDC HGH THRU: HCPCS

## 2022-04-30 PROCEDURE — U0005 INFEC AGEN DETEC AMPLI PROBE: HCPCS

## 2022-05-02 ENCOUNTER — TELEPHONE (OUTPATIENT)
Dept: SURGERY | Facility: CLINIC | Age: 77
End: 2022-05-02

## 2022-05-03 ENCOUNTER — ANESTHESIA (OUTPATIENT)
Dept: PERIOP | Facility: HOSPITAL | Age: 77
End: 2022-05-03

## 2022-05-03 ENCOUNTER — HOSPITAL ENCOUNTER (OUTPATIENT)
Facility: HOSPITAL | Age: 77
Setting detail: HOSPITAL OUTPATIENT SURGERY
Discharge: HOME | End: 2022-05-03
Attending: SURGERY | Admitting: SURGERY

## 2022-05-03 ENCOUNTER — ANESTHESIA EVENT (OUTPATIENT)
Dept: PERIOP | Facility: HOSPITAL | Age: 77
End: 2022-05-03

## 2022-05-03 VITALS
WEIGHT: 104.2 LBS | RESPIRATION RATE: 16 BRPM | OXYGEN SATURATION: 90 % | HEIGHT: 63 IN | HEART RATE: 88 BPM | BODY MASS INDEX: 18.46 KG/M2 | TEMPERATURE: 97.9 F | DIASTOLIC BLOOD PRESSURE: 72 MMHG | SYSTOLIC BLOOD PRESSURE: 146 MMHG

## 2022-05-03 DIAGNOSIS — N18.6 ESRD (END STAGE RENAL DISEASE): Primary | ICD-10-CM

## 2022-05-03 LAB
ANION GAP SERPL CALCULATED.3IONS-SCNC: 11.8 MMOL/L (ref 5–15)
BUN SERPL-MCNC: 15 MG/DL (ref 8–23)
BUN/CREAT SERPL: 3.7 (ref 7–25)
CALCIUM SPEC-SCNC: 9.3 MG/DL (ref 8.6–10.5)
CHLORIDE SERPL-SCNC: 98 MMOL/L (ref 98–107)
CO2 SERPL-SCNC: 29.2 MMOL/L (ref 22–29)
CREAT SERPL-MCNC: 4.08 MG/DL (ref 0.57–1)
EGFRCR SERPLBLD CKD-EPI 2021: 10.8 ML/MIN/1.73
GLUCOSE SERPL-MCNC: 102 MG/DL (ref 65–99)
POTASSIUM SERPL-SCNC: 3.7 MMOL/L (ref 3.5–5.2)
SODIUM SERPL-SCNC: 139 MMOL/L (ref 136–145)

## 2022-05-03 PROCEDURE — S0260 H&P FOR SURGERY: HCPCS | Performed by: SURGERY

## 2022-05-03 PROCEDURE — 49422 REMOVE TUNNELED IP CATH: CPT | Performed by: SURGERY

## 2022-05-03 PROCEDURE — 80048 BASIC METABOLIC PNL TOTAL CA: CPT | Performed by: SURGERY

## 2022-05-03 PROCEDURE — 25010000002 PROPOFOL 10 MG/ML EMULSION: Performed by: NURSE ANESTHETIST, CERTIFIED REGISTERED

## 2022-05-03 RX ORDER — LIDOCAINE HYDROCHLORIDE 20 MG/ML
INJECTION, SOLUTION INFILTRATION; PERINEURAL AS NEEDED
Status: DISCONTINUED | OUTPATIENT
Start: 2022-05-03 | End: 2022-05-03 | Stop reason: SURG

## 2022-05-03 RX ORDER — FLUMAZENIL 0.1 MG/ML
0.2 INJECTION INTRAVENOUS AS NEEDED
Status: DISCONTINUED | OUTPATIENT
Start: 2022-05-03 | End: 2022-05-03 | Stop reason: HOSPADM

## 2022-05-03 RX ORDER — HYDROCODONE BITARTRATE AND ACETAMINOPHEN 5; 325 MG/1; MG/1
TABLET ORAL
Qty: 15 TABLET | Refills: 0 | Status: SHIPPED | OUTPATIENT
Start: 2022-05-03

## 2022-05-03 RX ORDER — EPHEDRINE SULFATE 50 MG/ML
5 INJECTION, SOLUTION INTRAVENOUS ONCE AS NEEDED
Status: DISCONTINUED | OUTPATIENT
Start: 2022-05-03 | End: 2022-05-03 | Stop reason: HOSPADM

## 2022-05-03 RX ORDER — HYDROCODONE BITARTRATE AND ACETAMINOPHEN 7.5; 325 MG/1; MG/1
1 TABLET ORAL ONCE AS NEEDED
Status: DISCONTINUED | OUTPATIENT
Start: 2022-05-03 | End: 2022-05-03 | Stop reason: HOSPADM

## 2022-05-03 RX ORDER — SODIUM CHLORIDE 9 MG/ML
9 INJECTION, SOLUTION INTRAVENOUS CONTINUOUS
Status: DISCONTINUED | OUTPATIENT
Start: 2022-05-03 | End: 2022-05-03 | Stop reason: HOSPADM

## 2022-05-03 RX ORDER — NALOXONE HCL 0.4 MG/ML
0.2 VIAL (ML) INJECTION AS NEEDED
Status: DISCONTINUED | OUTPATIENT
Start: 2022-05-03 | End: 2022-05-03 | Stop reason: HOSPADM

## 2022-05-03 RX ORDER — LABETALOL HYDROCHLORIDE 5 MG/ML
5 INJECTION, SOLUTION INTRAVENOUS
Status: DISCONTINUED | OUTPATIENT
Start: 2022-05-03 | End: 2022-05-03 | Stop reason: HOSPADM

## 2022-05-03 RX ORDER — SODIUM CHLORIDE 0.9 % (FLUSH) 0.9 %
3-10 SYRINGE (ML) INJECTION AS NEEDED
Status: DISCONTINUED | OUTPATIENT
Start: 2022-05-03 | End: 2022-05-03 | Stop reason: HOSPADM

## 2022-05-03 RX ORDER — ONDANSETRON 2 MG/ML
4 INJECTION INTRAMUSCULAR; INTRAVENOUS ONCE AS NEEDED
Status: DISCONTINUED | OUTPATIENT
Start: 2022-05-03 | End: 2022-05-03 | Stop reason: HOSPADM

## 2022-05-03 RX ORDER — SODIUM CHLORIDE 9 MG/ML
INJECTION, SOLUTION INTRAVENOUS CONTINUOUS PRN
Status: DISCONTINUED | OUTPATIENT
Start: 2022-05-03 | End: 2022-05-03 | Stop reason: SURG

## 2022-05-03 RX ORDER — HYDRALAZINE HYDROCHLORIDE 20 MG/ML
5 INJECTION INTRAMUSCULAR; INTRAVENOUS
Status: DISCONTINUED | OUTPATIENT
Start: 2022-05-03 | End: 2022-05-03 | Stop reason: HOSPADM

## 2022-05-03 RX ORDER — MIDAZOLAM HYDROCHLORIDE 1 MG/ML
0.5 INJECTION INTRAMUSCULAR; INTRAVENOUS
Status: DISCONTINUED | OUTPATIENT
Start: 2022-05-03 | End: 2022-05-03 | Stop reason: HOSPADM

## 2022-05-03 RX ORDER — DIPHENHYDRAMINE HYDROCHLORIDE 50 MG/ML
12.5 INJECTION INTRAMUSCULAR; INTRAVENOUS
Status: DISCONTINUED | OUTPATIENT
Start: 2022-05-03 | End: 2022-05-03 | Stop reason: HOSPADM

## 2022-05-03 RX ORDER — SODIUM CHLORIDE, SODIUM LACTATE, POTASSIUM CHLORIDE, CALCIUM CHLORIDE 600; 310; 30; 20 MG/100ML; MG/100ML; MG/100ML; MG/100ML
9 INJECTION, SOLUTION INTRAVENOUS CONTINUOUS
Status: DISCONTINUED | OUTPATIENT
Start: 2022-05-03 | End: 2022-05-03

## 2022-05-03 RX ORDER — FAMOTIDINE 10 MG/ML
20 INJECTION, SOLUTION INTRAVENOUS ONCE
Status: COMPLETED | OUTPATIENT
Start: 2022-05-03 | End: 2022-05-03

## 2022-05-03 RX ORDER — ACETAMINOPHEN 325 MG/1
650 TABLET ORAL ONCE AS NEEDED
Status: DISCONTINUED | OUTPATIENT
Start: 2022-05-03 | End: 2022-05-03 | Stop reason: HOSPADM

## 2022-05-03 RX ORDER — PROPOFOL 10 MG/ML
VIAL (ML) INTRAVENOUS AS NEEDED
Status: DISCONTINUED | OUTPATIENT
Start: 2022-05-03 | End: 2022-05-03 | Stop reason: SURG

## 2022-05-03 RX ORDER — SODIUM CHLORIDE 0.9 % (FLUSH) 0.9 %
3 SYRINGE (ML) INJECTION EVERY 12 HOURS SCHEDULED
Status: DISCONTINUED | OUTPATIENT
Start: 2022-05-03 | End: 2022-05-03 | Stop reason: HOSPADM

## 2022-05-03 RX ORDER — ONDANSETRON 4 MG/1
4 TABLET, FILM COATED ORAL EVERY 6 HOURS PRN
Qty: 10 TABLET | Refills: 1 | Status: SHIPPED | OUTPATIENT
Start: 2022-05-03

## 2022-05-03 RX ORDER — BUPIVACAINE HYDROCHLORIDE AND EPINEPHRINE 5; 5 MG/ML; UG/ML
INJECTION, SOLUTION EPIDURAL; INTRACAUDAL; PERINEURAL AS NEEDED
Status: DISCONTINUED | OUTPATIENT
Start: 2022-05-03 | End: 2022-05-03 | Stop reason: HOSPADM

## 2022-05-03 RX ORDER — LIDOCAINE HYDROCHLORIDE 10 MG/ML
0.5 INJECTION, SOLUTION EPIDURAL; INFILTRATION; INTRACAUDAL; PERINEURAL ONCE AS NEEDED
Status: DISCONTINUED | OUTPATIENT
Start: 2022-05-03 | End: 2022-05-03 | Stop reason: HOSPADM

## 2022-05-03 RX ORDER — MAGNESIUM HYDROXIDE 1200 MG/15ML
LIQUID ORAL AS NEEDED
Status: DISCONTINUED | OUTPATIENT
Start: 2022-05-03 | End: 2022-05-03 | Stop reason: HOSPADM

## 2022-05-03 RX ORDER — FENTANYL CITRATE 50 UG/ML
50 INJECTION, SOLUTION INTRAMUSCULAR; INTRAVENOUS
Status: DISCONTINUED | OUTPATIENT
Start: 2022-05-03 | End: 2022-05-03 | Stop reason: HOSPADM

## 2022-05-03 RX ORDER — PROPOFOL 10 MG/ML
VIAL (ML) INTRAVENOUS CONTINUOUS PRN
Status: DISCONTINUED | OUTPATIENT
Start: 2022-05-03 | End: 2022-05-03 | Stop reason: SURG

## 2022-05-03 RX ORDER — DIPHENHYDRAMINE HCL 25 MG
25 CAPSULE ORAL
Status: DISCONTINUED | OUTPATIENT
Start: 2022-05-03 | End: 2022-05-03 | Stop reason: HOSPADM

## 2022-05-03 RX ADMIN — LIDOCAINE HYDROCHLORIDE 60 MG: 20 INJECTION, SOLUTION INFILTRATION; PERINEURAL at 09:12

## 2022-05-03 RX ADMIN — PROPOFOL 30 MG: 10 INJECTION, EMULSION INTRAVENOUS at 09:31

## 2022-05-03 RX ADMIN — PROPOFOL 30 MG: 10 INJECTION, EMULSION INTRAVENOUS at 09:22

## 2022-05-03 RX ADMIN — FAMOTIDINE 20 MG: 10 INJECTION INTRAVENOUS at 08:55

## 2022-05-03 RX ADMIN — Medication 75 MCG/KG/MIN: at 09:13

## 2022-05-03 RX ADMIN — PROPOFOL 30 MG: 10 INJECTION, EMULSION INTRAVENOUS at 09:13

## 2022-05-03 RX ADMIN — SODIUM CHLORIDE: 9 INJECTION, SOLUTION INTRAVENOUS at 09:06

## 2022-05-03 NOTE — PERIOPERATIVE NURSING NOTE
Dr. Severino notified of steady stream of yellow fluid from incision.  MD states that this is expected for several days and pt should reinforce dressing or use kiel pad to absorb drainage.  Pt aware.   Xeljanz Counseling: I discussed with the patient the risks of Xeljanz therapy including increased risk of infection, liver issues, headache, diarrhea, or cold symptoms. Live vaccines should be avoided. They were instructed to call if they have any problems.

## 2022-05-03 NOTE — OP NOTE
PREOPERATIVE DIAGNOSIS:  Tunneled peritoneal dialysis catheter no longer use    POSTOPERATIVE DIAGNOSIS (FINDINGS):  Same    PROCEDURE:  Removal tunneled peritoneal dialysis catheter    SURGEON:  Pablo Severnio MD    ASSISTANT:  Sri Gomes and Carina Calderon, were responsible for performing the following activities: suction, irrigation, suturing, closing, retraction, camera holding, and placing dressing, and their skilled assistance was necessary for the success of this case.    ANESTHESIA:  Monitored sedation    EBL:  Minimal    SPECIMEN(S):  none    DESCRIPTION:  In supine position under sedation prepped and draped usual sterile manner.  Half percent Marcaine with epinephrine infiltrated locally.  The cutaneous insertion site of the peritoneal dialysis catheter was slightly opened with electrocautery and with blunt and sharp dissection the internal cuff was identified and  from the surrounding fascia and then the catheter was removed intact.  Fascia was closed with 2-0 Vicryl suture.  Good hemostasis was noted.  Skin was closed with interrupted 3-0 nylon.  Tolerated well.    Pablo Severino M.D.

## 2022-05-03 NOTE — ANESTHESIA PREPROCEDURE EVALUATION
Anesthesia Evaluation     Patient summary reviewed and Nursing notes reviewed   history of anesthetic complications:               Airway   Mallampati: II  TM distance: >3 FB  Neck ROM: full  Dental      Pulmonary - negative pulmonary ROS   Cardiovascular     ECG reviewed  Rhythm: regular  Rate: normal    (+) hypertension, hyperlipidemia,       Neuro/Psych- negative ROS  GI/Hepatic/Renal/Endo    (+)  GERD,  renal disease ESRD and dialysis, thyroid problem     Musculoskeletal (-) negative ROS    Abdominal    Substance History - negative use     OB/GYN negative ob/gyn ROS         Other                        Anesthesia Plan    ASA 3     MAC   (Known Pseudocholinesterase deficiency  Avoid giving anectine    I have reviewed the patient's history with the patient and the chart, including all pertinent laboratory results and imaging. I have explained the risks of anesthesia including but not limited to dental damage, corneal abrasion, nerve injury, MI, stroke, and death. Questions asked and answered. Anesthetic plan discussed with patient and team as indicated. Patient expressed understanding of the above.  )  intravenous induction     Anesthetic plan, all risks, benefits, and alternatives have been provided, discussed and informed consent has been obtained with: patient.    Plan discussed with CRNA.        CODE STATUS:

## 2022-05-03 NOTE — DISCHARGE INSTRUCTIONS
Dr. Pablo Severino  [address]  [place] [address] [phone]    Discharge Instructions for Minor Surgery    Go home, rest and take it easy today; however, you should get up and move about several times today to reduce the risk of developing a clot in your legs.      You may experience some dizziness or memory loss from the anesthesia.  This may last for the next 24 hours.  Someone should plan on staying with you for the first 24 hours for your safety.    Do not make any important legal decisions or sign any legal papers for the next 24 hours.      Eat and drink lightly today.  Start off with liquids, jello, soup, crackers or other bland foods at first. You may advance your diet tomorrow as tolerated as long as you do not experience any nausea or vomiting.     If skin glue (Dermabond) was used, your incisions are protected and covered.  The invisible glue will dissolve on its own as your incision heals. If dressings were used, you may remove your outer dressings in 3-4 days.   Please leave the little white tapes known as steri-strips alone.  They usually fall off in 1-2 weeks.  Do not worry if they come off sooner.     If dressings were used, you may notice some bleeding/drainage on your outer dressings. A little bloody drainage is normal. If the bleeding/drainage is such that the bandage cannot absorb it, remove the dressing, apply clean gauze and apply firm pressure for a full 15 minutes.  If the bleeding continues, please call me.    You may shower tomorrow allowing water to run over your incisions; however, do not scrub your incisions.  No tub baths until your incisions are completely healed.    You have received a prescription for a narcotic pain medicine, as you may have some pain/discomfort following surgery.   You will not be totally pain free, but your pain medicine should make the pain tolerable.  Please take your pain medicine as prescribed and always take your pills with food to  prevent nausea. If you are having severe pain that cannot be controlled by the pain medicine, please contact me.  If the pain is such that narcotic pain medicine is not required, you may take Tylenol or Ibuprofen as directed unless indicated otherwise.      You have also received a prescription for an anti-nausea medicine.  Please take this as prescribed for any nausea or vomiting.  Nausea could be a result of the anesthesia or a result of the narcotic pain medicine.  If you experience severe nausea and vomiting that cannot be controlled by the nausea medicine, please call me.      No driving for 24 hours and for as long as you are taking your prescription pain medicine.      Please call the office at [phone] to schedule a follow-up in about 1 week.      Remember to contact me for any of the following:    Fever> 101 degrees  Severe pain that cannot be controlled by taking your pain pills  Severe nausea or vomiting that cannot be controlled by taking your nausea medicine  Significant bleeding from your incision  Drainage that has a bad smell or is yellow or green in appearance  Any other questions or concerns

## 2022-05-03 NOTE — H&P
HPI: 77-year-old lady no longer using peritoneal dialysis    PMH, PSH, MEDS AND ALLERGIES reviewed and reconciled with EPIC    PHYSICAL EXAM:  • Constitutional:  no acute distress  • Respiratory:  normal inspiratory effort  • Cardiovascular: regular rate  • Gastrointestinal: Soft    ASSESSMENT/PLAN:    Peritoneal dialysis catheter removal    Pablo Severino M.D.

## 2022-05-03 NOTE — ANESTHESIA POSTPROCEDURE EVALUATION
"Patient: Fany Todd    Procedure Summary     Date: 05/03/22 Room / Location: [facility] OR  / [facility] MAIN OR    Anesthesia Start: 0906 Anesthesia Stop: 0947    Procedure: REMOVAL PERITONEAL DIALYSIS CATHETER (N/A ) Diagnosis:       ESRD on peritoneal dialysis (HCC)      (ESRD on peritoneal dialysis (HCC) [N18.6, Z99.2])    Surgeons: Pablo Severino MD Provider: Rut Barnes MD    Anesthesia Type: MAC ASA Status: 3          Anesthesia Type: MAC    Vitals  Vitals Value Taken Time   /70 05/03/22 1011   Temp 36.6 °C (97.9 °F) 05/03/22 0950   Pulse 88 05/03/22 1023   Resp 16 05/03/22 0955   SpO2 92 % 05/03/22 1023   Vitals shown include unvalidated device data.        Post Anesthesia Care and Evaluation    Patient location during evaluation: PHASE II  Patient participation: complete - patient participated  Level of consciousness: awake  Pain management: adequate  Airway patency: patent  Anesthetic complications: No anesthetic complications    Cardiovascular status: acceptable  Respiratory status: acceptable  Hydration status: acceptable    Comments: /65   Pulse 84   Temp 36.6 °C (97.9 °F) (Oral)   Resp 16   Ht 160 cm (63\")   Wt 47.3 kg (104 lb 3.2 oz)   SpO2 98%   BMI 18.46 kg/m²       "

## 2022-06-06 ENCOUNTER — PRE-ADMISSION TESTING (OUTPATIENT)
Dept: PREADMISSION TESTING | Facility: HOSPITAL | Age: 77
End: 2022-06-06

## 2022-06-06 VITALS
HEIGHT: 63 IN | BODY MASS INDEX: 18.78 KG/M2 | RESPIRATION RATE: 16 BRPM | OXYGEN SATURATION: 96 % | SYSTOLIC BLOOD PRESSURE: 179 MMHG | DIASTOLIC BLOOD PRESSURE: 78 MMHG | TEMPERATURE: 96.2 F | HEART RATE: 83 BPM | WEIGHT: 106 LBS

## 2022-06-06 LAB
ANION GAP SERPL CALCULATED.3IONS-SCNC: 15 MMOL/L (ref 5–15)
BUN SERPL-MCNC: 13 MG/DL (ref 8–23)
BUN/CREAT SERPL: 5.5 (ref 7–25)
CALCIUM SPEC-SCNC: 9.2 MG/DL (ref 8.6–10.5)
CHLORIDE SERPL-SCNC: 97 MMOL/L (ref 98–107)
CO2 SERPL-SCNC: 26 MMOL/L (ref 22–29)
CREAT SERPL-MCNC: 2.38 MG/DL (ref 0.57–1)
DEPRECATED RDW RBC AUTO: 55 FL (ref 37–54)
EGFRCR SERPLBLD CKD-EPI 2021: 20.7 ML/MIN/1.73
ERYTHROCYTE [DISTWIDTH] IN BLOOD BY AUTOMATED COUNT: 17.3 % (ref 12.3–15.4)
GLUCOSE SERPL-MCNC: 86 MG/DL (ref 65–99)
HCT VFR BLD AUTO: 32.7 % (ref 34–46.6)
HGB BLD-MCNC: 10.7 G/DL (ref 12–15.9)
MCH RBC QN AUTO: 28.8 PG (ref 26.6–33)
MCHC RBC AUTO-ENTMCNC: 32.7 G/DL (ref 31.5–35.7)
MCV RBC AUTO: 87.9 FL (ref 79–97)
PLATELET # BLD AUTO: 235 10*3/MM3 (ref 140–450)
PMV BLD AUTO: 9.1 FL (ref 6–12)
POTASSIUM SERPL-SCNC: 3.6 MMOL/L (ref 3.5–5.2)
RBC # BLD AUTO: 3.72 10*6/MM3 (ref 3.77–5.28)
SARS-COV-2 RNA RESP QL NAA+PROBE: NOT DETECTED
SODIUM SERPL-SCNC: 138 MMOL/L (ref 136–145)
WBC NRBC COR # BLD: 6 10*3/MM3 (ref 3.4–10.8)

## 2022-06-06 PROCEDURE — C9803 HOPD COVID-19 SPEC COLLECT: HCPCS | Performed by: NURSE PRACTITIONER

## 2022-06-06 PROCEDURE — 36415 COLL VENOUS BLD VENIPUNCTURE: CPT

## 2022-06-06 PROCEDURE — U0005 INFEC AGEN DETEC AMPLI PROBE: HCPCS | Performed by: NURSE PRACTITIONER

## 2022-06-06 PROCEDURE — U0003 INFECTIOUS AGENT DETECTION BY NUCLEIC ACID (DNA OR RNA); SEVERE ACUTE RESPIRATORY SYNDROME CORONAVIRUS 2 (SARS-COV-2) (CORONAVIRUS DISEASE [COVID-19]), AMPLIFIED PROBE TECHNIQUE, MAKING USE OF HIGH THROUGHPUT TECHNOLOGIES AS DESCRIBED BY CMS-2020-01-R: HCPCS | Performed by: NURSE PRACTITIONER

## 2022-06-06 PROCEDURE — 80048 BASIC METABOLIC PNL TOTAL CA: CPT

## 2022-06-06 PROCEDURE — 85027 COMPLETE CBC AUTOMATED: CPT

## 2022-06-06 RX ORDER — CHLORHEXIDINE GLUCONATE 500 MG/1
1 CLOTH TOPICAL
COMMUNITY
End: 2022-06-07 | Stop reason: HOSPADM

## 2022-06-06 NOTE — DISCHARGE INSTRUCTIONS
Take the following medications the morning of surgery:    NORVASC    If you are on prescription narcotic pain medication to control your pain you may also take that medication the morning of surgery.    General Instructions:  Do not eat solid food after midnight the night before surgery.  You may drink clear liquids day of surgery but must stop at least one hour before your hospital arrival time.  It is beneficial for you to have a clear drink that contains carbohydrates the day of surgery.  We suggest a 12 to 20 ounce bottle of Gatorade or Powerade for non-diabetic patients or  ( WHATEVER YOU CAN HAVE FOR LIQUIDS FOR YOUR RENAL DIET )    Clear liquids are liquids you can see through.  Nothing red in color.     Plain water                               Sports drinks  Sodas                                   Gelatin (Jell-O)  Fruit juices without pulp such as white grape juice and apple juice  Popsicles that contain no fruit or yogurt  Tea or coffee (no cream or milk added)  Gatorade / Powerade  G2 / Powerade Zero      Bring any papers given to you in the doctor’s office.  Wear clean comfortable clothes.  Do not wear contact lenses, false eyelashes or make-up.  Bring a case for your glasses.   Remove all piercings.  Leave jewelry and any other valuables at home.  Hair extensions with metal clips must be removed prior to surgery.  The Pre-Admission Testing nurse will instruct you to bring medications if unable to obtain an accurate list in Pre-Admission Testing.  REPORT TO SURGERY ON 6-7-2022 AT 0700 AM        Preventing a Surgical Site Infection:  For 2 to 3 days before surgery, avoid shaving with a razor because the razor can irritate skin and make it easier to develop an infection.    Any areas of open skin can increase the risk of a post-operative wound infection by allowing bacteria to enter and travel throughout the body.  Notify your surgeon if you have any skin wounds / rashes even if it is not near the  expected surgical site.  The area will need assessed to determine if surgery should be delayed until it is healed.  The night prior to surgery shower using a fresh bar of anti-bacterial soap (such as Dial) and clean washcloth.  Sleep in a clean bed with clean clothing.  Do not allow pets to sleep with you.  Shower on the morning of surgery using a fresh bar of anti-bacterial soap (such as Dial) and clean washcloth.  Dry with a clean towel and dress in clean clothing.  Ask your surgeon if you will be receiving antibiotics prior to surgery.  Make sure you, your family, and all healthcare providers clean their hands with soap and water or an alcohol based hand  before caring for you or your wound.    Day of surgery:  Your arrival time is approximately two hours before your scheduled surgery time.  Upon arrival, a Pre-op nurse and Anesthesiologist will review your health history, obtain vital signs, and answer questions you may have.  The only belongings needed at this time will be a list of your home medications and if applicable your C-PAP/BI-PAP machine.  A Pre-op nurse will start an IV and you may receive medication in preparation for surgery, including something to help you relax.     Please be aware that surgery does come with discomfort.  We want to make every effort to control your discomfort so please discuss any uncontrolled symptoms with your nurse.   Your doctor will most likely have prescribed pain medications.      If you are going home after surgery you will receive individualized written care instructions before being discharged.  A responsible adult must drive you to and from the hospital on the day of your surgery and stay with you for 24 hours.  Discharge prescriptions can be filled by the hospital pharmacy during regular pharmacy hours.  If you are having surgery late in the day/evening your prescription may be e-prescribed to your pharmacy.  Please verify your pharmacy hours or chose a 24  hour pharmacy to avoid not having access to your prescription because your pharmacy has closed for the day.        CHLORHEXIDINE CLOTH INSTRUCTIONS  The morning of surgery follow these instructions using the Chlorhexidine cloths you've been given.  These steps reduce bacteria on the body.  Do not use the cloths near your eyes, ears mouth, genitalia or on open wounds.  Throw the cloths away after use but do not try to flush them down a toilet.      Open and remove one cloth at a time from the package.    Leave the cloth unfolded and begin the bathing.  Massage the skin with the cloths using gentle pressure to remove bacteria.  Do not scrub harshly.   Follow the steps below with one 2% CHG cloth per area (6 total cloths).  One cloth for neck, shoulders and chest.  One cloth for both arms, hands, fingers and underarms (do underarms last).  One cloth for the abdomen followed by groin.  One cloth for right leg and foot including between the toes.  One cloth for left leg and foot including between the toes.  The last cloth is to be used for the back of the neck, back and buttocks.    Allow the CHG to air dry 3 minutes on the skin which will give it time to work and decrease the chance of irritation.  The skin may feel sticky until it is dry.  Do not rinse with water or any other liquid or you will lose the beneficial effects of the CHG.  If mild skin irritation occurs, do rinse the skin to remove the CHG.  Report this to the nurse at time of admission.  Do not apply lotions, creams, ointments, deodorants or perfumes after using the clothes. Dress in clean clothes before coming to the hospital.     If you have any questions please call Pre-Admission Testing at [phone].  Deductibles and co-payments are collected on the day of service. Please be prepared to pay the required co-pay, deductible or deposit on the day of service as defined by your plan.    Patient Education for Self-Quarantine Process    Following your  COVID testing, we strongly recommend that you wear a mask when you are with other people and practice social distancing.   Limit your activities to only required outings.  Wash your hands with soap and water frequently for at least 20 seconds.   Avoid touching your eyes, nose and mouth with unwashed hands.  Do not share anything - utensils, drinking glasses, food from the same bowl.   Sanitize household surfaces daily. Include all high touch areas (door handles, light switches, phones, countertops, etc.)    Call your surgeon immediately if you experience any of the following symptoms:  Sore Throat  Shortness of Breath or difficulty breathing  Cough  Chills  Body soreness or muscle pain  Headache  Fever  New loss of taste or smell  Do not arrive for your surgery ill.  Your procedure will need to be rescheduled to another time.  You will need to call your physician before the day of surgery to avoid any unnecessary exposure to hospital staff as well as other patients.

## 2022-06-07 ENCOUNTER — HOSPITAL ENCOUNTER (OUTPATIENT)
Facility: HOSPITAL | Age: 77
Setting detail: HOSPITAL OUTPATIENT SURGERY
Discharge: HOME | End: 2022-06-07
Attending: SURGERY | Admitting: SURGERY

## 2022-06-07 ENCOUNTER — ANESTHESIA (OUTPATIENT)
Dept: PERIOP | Facility: HOSPITAL | Age: 77
End: 2022-06-07

## 2022-06-07 ENCOUNTER — ANESTHESIA EVENT (OUTPATIENT)
Dept: PERIOP | Facility: HOSPITAL | Age: 77
End: 2022-06-07

## 2022-06-07 VITALS
TEMPERATURE: 97.8 F | HEIGHT: 63 IN | BODY MASS INDEX: 18.89 KG/M2 | RESPIRATION RATE: 16 BRPM | OXYGEN SATURATION: 91 % | WEIGHT: 106.6 LBS | DIASTOLIC BLOOD PRESSURE: 75 MMHG | SYSTOLIC BLOOD PRESSURE: 140 MMHG | HEART RATE: 80 BPM

## 2022-06-07 DIAGNOSIS — N18.6 ESRD (END STAGE RENAL DISEASE): Primary | ICD-10-CM

## 2022-06-07 LAB
ANION GAP SERPL CALCULATED.3IONS-SCNC: 13.9 MMOL/L (ref 5–15)
BUN SERPL-MCNC: 23 MG/DL (ref 8–23)
BUN/CREAT SERPL: 6.2 (ref 7–25)
CALCIUM SPEC-SCNC: 9.5 MG/DL (ref 8.6–10.5)
CHLORIDE SERPL-SCNC: 95 MMOL/L (ref 98–107)
CO2 SERPL-SCNC: 26.1 MMOL/L (ref 22–29)
CREAT SERPL-MCNC: 3.72 MG/DL (ref 0.57–1)
EGFRCR SERPLBLD CKD-EPI 2021: 12.1 ML/MIN/1.73
GLUCOSE SERPL-MCNC: 95 MG/DL (ref 65–99)
POTASSIUM SERPL-SCNC: 4.7 MMOL/L (ref 3.5–5.2)
SODIUM SERPL-SCNC: 135 MMOL/L (ref 136–145)

## 2022-06-07 PROCEDURE — 25010000002 ONDANSETRON PER 1 MG: Performed by: NURSE ANESTHETIST, CERTIFIED REGISTERED

## 2022-06-07 PROCEDURE — 80048 BASIC METABOLIC PNL TOTAL CA: CPT | Performed by: SURGERY

## 2022-06-07 PROCEDURE — 25010000002 PROPOFOL 10 MG/ML EMULSION: Performed by: NURSE ANESTHETIST, CERTIFIED REGISTERED

## 2022-06-07 PROCEDURE — 25010000002 DEXAMETHASONE PER 1 MG: Performed by: NURSE ANESTHETIST, CERTIFIED REGISTERED

## 2022-06-07 PROCEDURE — 25010000002 PHENYLEPHRINE 10 MG/ML SOLUTION: Performed by: NURSE ANESTHETIST, CERTIFIED REGISTERED

## 2022-06-07 PROCEDURE — 25010000002 HEPARIN (PORCINE) PER 1000 UNITS: Performed by: SURGERY

## 2022-06-07 PROCEDURE — C1768 GRAFT, VASCULAR: HCPCS | Performed by: SURGERY

## 2022-06-07 PROCEDURE — 25010000002 CEFAZOLIN PER 500 MG: Performed by: SURGERY

## 2022-06-07 PROCEDURE — 25010000002 HEPARIN (PORCINE) PER 1000 UNITS: Performed by: NURSE ANESTHETIST, CERTIFIED REGISTERED

## 2022-06-07 PROCEDURE — 25010000002 FENTANYL CITRATE (PF) 50 MCG/ML SOLUTION: Performed by: NURSE ANESTHETIST, CERTIFIED REGISTERED

## 2022-06-07 PROCEDURE — 25010000002 PROTAMINE SULFATE PER 10 MG: Performed by: NURSE ANESTHETIST, CERTIFIED REGISTERED

## 2022-06-07 PROCEDURE — 25010000002 CEFAZOLIN IN DEXTROSE 2-4 GM/100ML-% SOLUTION: Performed by: SURGERY

## 2022-06-07 DEVICE — PROPATEN VASCULAR GRAFT SW IR 4-7MMX45CM 38CM RINGS HEPARIN
Type: IMPLANTABLE DEVICE | Site: ARM | Status: FUNCTIONAL
Brand: GORE PROPATEN VASCULAR GRAFT

## 2022-06-07 DEVICE — ABSORBABLE HEMOSTAT (OXIDIZED REGENERATED CELLULOSE, U.S.P.)
Type: IMPLANTABLE DEVICE | Site: ARM | Status: FUNCTIONAL
Brand: SURGICEL

## 2022-06-07 DEVICE — LIGACLIP MCA MULTIPLE CLIP APPLIERS, 20 SMALL CLIPS
Type: IMPLANTABLE DEVICE | Site: ARM | Status: FUNCTIONAL
Brand: LIGACLIP

## 2022-06-07 RX ORDER — SODIUM CHLORIDE 0.9 % (FLUSH) 0.9 %
3 SYRINGE (ML) INJECTION EVERY 12 HOURS SCHEDULED
Status: DISCONTINUED | OUTPATIENT
Start: 2022-06-07 | End: 2022-06-07 | Stop reason: HOSPADM

## 2022-06-07 RX ORDER — SODIUM CHLORIDE 0.9 % (FLUSH) 0.9 %
3-10 SYRINGE (ML) INJECTION AS NEEDED
Status: DISCONTINUED | OUTPATIENT
Start: 2022-06-07 | End: 2022-06-07 | Stop reason: HOSPADM

## 2022-06-07 RX ORDER — FENTANYL CITRATE 50 UG/ML
50 INJECTION, SOLUTION INTRAMUSCULAR; INTRAVENOUS
Status: DISCONTINUED | OUTPATIENT
Start: 2022-06-07 | End: 2022-06-07 | Stop reason: HOSPADM

## 2022-06-07 RX ORDER — IBUPROFEN 600 MG/1
600 TABLET ORAL ONCE AS NEEDED
Status: DISCONTINUED | OUTPATIENT
Start: 2022-06-07 | End: 2022-06-07 | Stop reason: HOSPADM

## 2022-06-07 RX ORDER — LIDOCAINE HYDROCHLORIDE 10 MG/ML
0.5 INJECTION, SOLUTION EPIDURAL; INFILTRATION; INTRACAUDAL; PERINEURAL ONCE AS NEEDED
Status: DISCONTINUED | OUTPATIENT
Start: 2022-06-07 | End: 2022-06-07 | Stop reason: HOSPADM

## 2022-06-07 RX ORDER — HYDROMORPHONE HYDROCHLORIDE 1 MG/ML
0.5 INJECTION, SOLUTION INTRAMUSCULAR; INTRAVENOUS; SUBCUTANEOUS
Status: DISCONTINUED | OUTPATIENT
Start: 2022-06-07 | End: 2022-06-07 | Stop reason: HOSPADM

## 2022-06-07 RX ORDER — MIDAZOLAM HYDROCHLORIDE 1 MG/ML
0.5 INJECTION INTRAMUSCULAR; INTRAVENOUS
Status: DISCONTINUED | OUTPATIENT
Start: 2022-06-07 | End: 2022-06-07 | Stop reason: HOSPADM

## 2022-06-07 RX ORDER — LIDOCAINE HYDROCHLORIDE 20 MG/ML
INJECTION, SOLUTION INFILTRATION; PERINEURAL AS NEEDED
Status: DISCONTINUED | OUTPATIENT
Start: 2022-06-07 | End: 2022-06-07 | Stop reason: SURG

## 2022-06-07 RX ORDER — EPHEDRINE SULFATE 50 MG/ML
5 INJECTION, SOLUTION INTRAVENOUS ONCE AS NEEDED
Status: DISCONTINUED | OUTPATIENT
Start: 2022-06-07 | End: 2022-06-07 | Stop reason: HOSPADM

## 2022-06-07 RX ORDER — PHENYLEPHRINE HYDROCHLORIDE 10 MG/ML
INJECTION INTRAVENOUS AS NEEDED
Status: DISCONTINUED | OUTPATIENT
Start: 2022-06-07 | End: 2022-06-07 | Stop reason: SURG

## 2022-06-07 RX ORDER — DEXAMETHASONE SODIUM PHOSPHATE 10 MG/ML
INJECTION INTRAMUSCULAR; INTRAVENOUS AS NEEDED
Status: DISCONTINUED | OUTPATIENT
Start: 2022-06-07 | End: 2022-06-07 | Stop reason: SURG

## 2022-06-07 RX ORDER — SODIUM CHLORIDE 9 MG/ML
9 INJECTION, SOLUTION INTRAVENOUS CONTINUOUS PRN
Status: DISCONTINUED | OUTPATIENT
Start: 2022-06-07 | End: 2022-06-07 | Stop reason: HOSPADM

## 2022-06-07 RX ORDER — FENTANYL CITRATE 50 UG/ML
INJECTION, SOLUTION INTRAMUSCULAR; INTRAVENOUS AS NEEDED
Status: DISCONTINUED | OUTPATIENT
Start: 2022-06-07 | End: 2022-06-07 | Stop reason: SURG

## 2022-06-07 RX ORDER — PROPOFOL 10 MG/ML
VIAL (ML) INTRAVENOUS AS NEEDED
Status: DISCONTINUED | OUTPATIENT
Start: 2022-06-07 | End: 2022-06-07 | Stop reason: SURG

## 2022-06-07 RX ORDER — FLUMAZENIL 0.1 MG/ML
0.2 INJECTION INTRAVENOUS AS NEEDED
Status: DISCONTINUED | OUTPATIENT
Start: 2022-06-07 | End: 2022-06-07 | Stop reason: HOSPADM

## 2022-06-07 RX ORDER — PROMETHAZINE HYDROCHLORIDE 25 MG/1
25 SUPPOSITORY RECTAL ONCE AS NEEDED
Status: DISCONTINUED | OUTPATIENT
Start: 2022-06-07 | End: 2022-06-07 | Stop reason: HOSPADM

## 2022-06-07 RX ORDER — HYDROCODONE BITARTRATE AND ACETAMINOPHEN 7.5; 325 MG/1; MG/1
1 TABLET ORAL ONCE AS NEEDED
Status: COMPLETED | OUTPATIENT
Start: 2022-06-07 | End: 2022-06-07

## 2022-06-07 RX ORDER — CEFAZOLIN SODIUM 2 G/100ML
2 INJECTION, SOLUTION INTRAVENOUS ONCE
Status: COMPLETED | OUTPATIENT
Start: 2022-06-07 | End: 2022-06-07

## 2022-06-07 RX ORDER — ROCURONIUM BROMIDE 10 MG/ML
INJECTION, SOLUTION INTRAVENOUS AS NEEDED
Status: DISCONTINUED | OUTPATIENT
Start: 2022-06-07 | End: 2022-06-07 | Stop reason: SURG

## 2022-06-07 RX ORDER — NALOXONE HCL 0.4 MG/ML
0.2 VIAL (ML) INJECTION AS NEEDED
Status: DISCONTINUED | OUTPATIENT
Start: 2022-06-07 | End: 2022-06-07 | Stop reason: HOSPADM

## 2022-06-07 RX ORDER — DIPHENHYDRAMINE HCL 25 MG
25 CAPSULE ORAL
Status: DISCONTINUED | OUTPATIENT
Start: 2022-06-07 | End: 2022-06-07 | Stop reason: HOSPADM

## 2022-06-07 RX ORDER — HYDROCODONE BITARTRATE AND ACETAMINOPHEN 5; 325 MG/1; MG/1
1-2 TABLET ORAL EVERY 4 HOURS PRN
Qty: 12 TABLET | Refills: 0 | Status: SHIPPED | OUTPATIENT
Start: 2022-06-07

## 2022-06-07 RX ORDER — ONDANSETRON 2 MG/ML
INJECTION INTRAMUSCULAR; INTRAVENOUS AS NEEDED
Status: DISCONTINUED | OUTPATIENT
Start: 2022-06-07 | End: 2022-06-07 | Stop reason: SURG

## 2022-06-07 RX ORDER — OXYCODONE AND ACETAMINOPHEN 7.5; 325 MG/1; MG/1
1 TABLET ORAL EVERY 4 HOURS PRN
Status: DISCONTINUED | OUTPATIENT
Start: 2022-06-07 | End: 2022-06-07 | Stop reason: HOSPADM

## 2022-06-07 RX ORDER — FAMOTIDINE 10 MG/ML
20 INJECTION, SOLUTION INTRAVENOUS ONCE
Status: COMPLETED | OUTPATIENT
Start: 2022-06-07 | End: 2022-06-07

## 2022-06-07 RX ORDER — PROMETHAZINE HYDROCHLORIDE 25 MG/1
25 TABLET ORAL ONCE AS NEEDED
Status: DISCONTINUED | OUTPATIENT
Start: 2022-06-07 | End: 2022-06-07 | Stop reason: HOSPADM

## 2022-06-07 RX ORDER — HYDRALAZINE HYDROCHLORIDE 20 MG/ML
5 INJECTION INTRAMUSCULAR; INTRAVENOUS
Status: DISCONTINUED | OUTPATIENT
Start: 2022-06-07 | End: 2022-06-07 | Stop reason: HOSPADM

## 2022-06-07 RX ORDER — LABETALOL HYDROCHLORIDE 5 MG/ML
5 INJECTION, SOLUTION INTRAVENOUS
Status: DISCONTINUED | OUTPATIENT
Start: 2022-06-07 | End: 2022-06-07 | Stop reason: HOSPADM

## 2022-06-07 RX ORDER — MAGNESIUM HYDROXIDE 1200 MG/15ML
LIQUID ORAL AS NEEDED
Status: DISCONTINUED | OUTPATIENT
Start: 2022-06-07 | End: 2022-06-07 | Stop reason: HOSPADM

## 2022-06-07 RX ORDER — DIPHENHYDRAMINE HYDROCHLORIDE 50 MG/ML
12.5 INJECTION INTRAMUSCULAR; INTRAVENOUS
Status: DISCONTINUED | OUTPATIENT
Start: 2022-06-07 | End: 2022-06-07 | Stop reason: HOSPADM

## 2022-06-07 RX ORDER — PROTAMINE SULFATE 10 MG/ML
INJECTION, SOLUTION INTRAVENOUS AS NEEDED
Status: DISCONTINUED | OUTPATIENT
Start: 2022-06-07 | End: 2022-06-07 | Stop reason: SURG

## 2022-06-07 RX ORDER — ONDANSETRON 2 MG/ML
4 INJECTION INTRAMUSCULAR; INTRAVENOUS ONCE AS NEEDED
Status: COMPLETED | OUTPATIENT
Start: 2022-06-07 | End: 2022-06-07

## 2022-06-07 RX ORDER — HEPARIN SODIUM 1000 [USP'U]/ML
INJECTION, SOLUTION INTRAVENOUS; SUBCUTANEOUS AS NEEDED
Status: DISCONTINUED | OUTPATIENT
Start: 2022-06-07 | End: 2022-06-07 | Stop reason: SURG

## 2022-06-07 RX ADMIN — PHENYLEPHRINE HYDROCHLORIDE 100 MCG: 10 INJECTION, SOLUTION INTRAVENOUS at 10:13

## 2022-06-07 RX ADMIN — PROPOFOL 50 MG: 10 INJECTION, EMULSION INTRAVENOUS at 10:24

## 2022-06-07 RX ADMIN — SUGAMMADEX 200 MG: 100 INJECTION, SOLUTION INTRAVENOUS at 10:24

## 2022-06-07 RX ADMIN — HEPARIN SODIUM 5000 UNITS: 1000 INJECTION INTRAVENOUS; SUBCUTANEOUS at 09:33

## 2022-06-07 RX ADMIN — DEXAMETHASONE SODIUM PHOSPHATE 10 MG: 10 INJECTION INTRAMUSCULAR; INTRAVENOUS at 09:22

## 2022-06-07 RX ADMIN — PHENYLEPHRINE HYDROCHLORIDE 100 MCG: 10 INJECTION, SOLUTION INTRAVENOUS at 09:59

## 2022-06-07 RX ADMIN — FENTANYL CITRATE 50 MCG: 0.05 INJECTION, SOLUTION INTRAMUSCULAR; INTRAVENOUS at 09:03

## 2022-06-07 RX ADMIN — ONDANSETRON 4 MG: 2 INJECTION INTRAMUSCULAR; INTRAVENOUS at 10:24

## 2022-06-07 RX ADMIN — LIDOCAINE HYDROCHLORIDE 60 MG: 20 INJECTION, SOLUTION INFILTRATION; PERINEURAL at 09:03

## 2022-06-07 RX ADMIN — SODIUM CHLORIDE: 9 INJECTION, SOLUTION INTRAVENOUS at 08:57

## 2022-06-07 RX ADMIN — ROCURONIUM BROMIDE 40 MG: 50 INJECTION INTRAVENOUS at 09:04

## 2022-06-07 RX ADMIN — PROPOFOL 120 MG: 10 INJECTION, EMULSION INTRAVENOUS at 09:03

## 2022-06-07 RX ADMIN — FENTANYL CITRATE 50 MCG: 50 INJECTION INTRAMUSCULAR; INTRAVENOUS at 11:05

## 2022-06-07 RX ADMIN — CEFAZOLIN SODIUM 2 G: 2 INJECTION, SOLUTION INTRAVENOUS at 08:52

## 2022-06-07 RX ADMIN — HYDROCODONE BITARTRATE AND ACETAMINOPHEN 1 TABLET: 7.5; 325 TABLET ORAL at 12:05

## 2022-06-07 RX ADMIN — ONDANSETRON 4 MG: 2 INJECTION INTRAMUSCULAR; INTRAVENOUS at 12:07

## 2022-06-07 RX ADMIN — PROTAMINE SULFATE 30 MG: 10 INJECTION, SOLUTION INTRAVENOUS at 10:16

## 2022-06-07 RX ADMIN — FENTANYL CITRATE 50 MCG: 0.05 INJECTION, SOLUTION INTRAMUSCULAR; INTRAVENOUS at 09:24

## 2022-06-07 RX ADMIN — FAMOTIDINE 20 MG: 10 INJECTION, SOLUTION INTRAVENOUS at 08:48

## 2022-06-07 RX ADMIN — FENTANYL CITRATE 50 MCG: 50 INJECTION INTRAMUSCULAR; INTRAVENOUS at 11:43

## 2022-06-07 NOTE — ANESTHESIA POSTPROCEDURE EVALUATION
"Patient: Fany Todd    Procedure Summary     Date: 06/07/22 Room / Location: [facility] OR [address] [place] [address] MAIN OR    Anesthesia Start: 0857 Anesthesia Stop: 1054    Procedure: LEFT BRACHIAL AXILLARY ARTERIOVENOUS SHUNT (Left Head) Diagnosis:     Surgeons: Liborio Dumont MD Provider: Gian Dumont MD    Anesthesia Type: general ASA Status: 3          Anesthesia Type: general    Vitals  Vitals Value Taken Time   /93 06/07/22 1211   Temp 37.1 °C (98.7 °F) 06/07/22 1050   Pulse 85 06/07/22 1215   Resp 16 06/07/22 1140   SpO2 94 % 06/07/22 1215   Vitals shown include unvalidated device data.        Post Anesthesia Care and Evaluation    Patient location during evaluation: bedside  Patient participation: complete - patient participated  Level of consciousness: awake  Pain score: 2  Pain management: adequate    Airway patency: patent  Anesthetic complications: No anesthetic complications  PONV Status: none  Cardiovascular status: acceptable  Respiratory status: acceptable  Hydration status: acceptable    Comments: /69   Pulse 73   Temp 37.1 °C (98.7 °F)   Resp 16   Ht 160 cm (63\")   Wt 48.4 kg (106 lb 9.6 oz)   SpO2 95%   BMI 18.88 kg/m²         "

## 2022-06-07 NOTE — ANESTHESIA PREPROCEDURE EVALUATION
Anesthesia Evaluation     Patient summary reviewed and Nursing notes reviewed   history of anesthetic complications:  NPO Solid Status: > 8 hours  NPO Liquid Status: > 2 hours           Airway   Mallampati: II  TM distance: >3 FB  Neck ROM: full  Dental - normal exam     Pulmonary - negative pulmonary ROS and normal exam   Cardiovascular - normal exam    ECG reviewed    (+) hypertension, hyperlipidemia,       Neuro/Psych- negative ROS  GI/Hepatic/Renal/Endo    (+)  GERD,  renal disease ESRD, thyroid problem     Musculoskeletal (-) negative ROS    Abdominal    Substance History - negative use     OB/GYN negative ob/gyn ROS         Other      history of cancer                  Anesthesia Plan    ASA 3     general     (Pseudocholinesterase deficiency)    Anesthetic plan, risks, benefits, and alternatives have been provided, discussed and informed consent has been obtained with: patient.        CODE STATUS:

## 2022-06-07 NOTE — DISCHARGE INSTRUCTIONS
[facility]  [address], [address] ,[place], [place]  [address], [address]  [phone]    Post-Operative Instructions for AV Fistula / Graft   Diet: Regular Diet    Medications: Take your regularly scheduled medications on the day of your surgery, unless your doctor has directed you otherwise. You may be sent home with a prescription for pain medication, follow the directions as prescribed.    Activity Restrictions / Driving: Avoid lifting more than 15 pounds or other activities that stress or compress the access area. No driving for the remainder of the day after surgery. You may drive when you no longer are taking narcotic pain medications. If a nerve block was done to numb your arm for surgery, you will be placed in an arm sling.  This numbness and inability to move the arm can last for as little as 6 hours but as many as 18.  The sling should be used during this time but can be removed when sensation and movement of your arm is normal and does not need to be used after that. Use of the arm is encouraged after the surgery.    Incision Care: Some bruising is normal. If you have drainage from the incision please notify the office. Dressing should be removed in 48 hours. After dressing is removed, it is OK to shower. Do not submerge incision until cleared by your surgeon (bath or swimming).    Bathing and Showering: You may shower after you remove your dressing.    Follow-up Appointments: You will need to return to the office for a follow-up visit within 1-3 weeks after your surgery. Please make sure you have your appointment scheduled, call [phone].    The patient (you) should:  1. Avoid wearing tight constrictive clothing over that arm.  2. Avoid wearing jewelry that is tight, such as a watch on the access arm.  3. Avoid carrying heavy objects.  4. Avoid purse straps over the fistula.  5. Avoid sleeping on the arm or keeping it bent for extended periods of time.  6.  "Each day, using your opposite hand, feel over the fistula for the \"thrill\" or vibration that is normally present.    Fistula Information / Care:   It is normal to have swelling in the surgical area. To help control this swelling, you should elevate your arm on a pillow.   Wiggle your fingers and clinch your fist 10 times every hour, while awake, for the first 5-7 days. Also, bend and straighten at the elbow to regain normal range of motion. These exercises are designed to promote circulation in the fingers and aid in draining away the excess fluid accumulation in the immediate area.  No blood pressures or needle sticks in the arm with your access.    Call the office for the followin. Fever greater than 101.0  2. Uncontrolled pain. This is on a scale of 1-10 (10 being the worst pain imaginable) your pain is a level 7 or above.  3. It is important that you notify our office if you are having numbness and significant pain in the extremity in which you have just had surgery!  4. Decreased or absent thrill.  5. Nausea, diarrhea, and/or vomiting that continue for 12-24 hours.  6. Signs of an infection: redness, increased swelling, drainage, fever and/or chills.  7. Chest pain or difficulty breathing.    The fistula or graft CAN NOT be used until the MD has given written approval. Generally, a graft will be ready to use in 2 weeks, and a fistula will be ready to use in 6-8 weeks.     If you have further questions after reading this handout, the office is open from 8:30am to 5:00pm Monday through Friday. Call [phone].   "

## 2022-06-07 NOTE — OP NOTE
Operative Note  Location: [facility]  Date of Admission:  6/7/2022  OR Date: 6/7/2022    Pre-op Diagnosis:  End-stage renal disease requiring hemodialysis    Post-op Diagnosis:  End-stage renal disease requiring hemodialysis    Procedure:   Placement of left brachioaxillary hemodialysis shunt    Surgeon: Liborio Dumont MD    Assistant: Estee FONTAINE    Anesthesia: General via laryngeal mask airway    Staff:   Circulator: Eleni Garcia RN  Scrub Person: Silke Baig  Assistant: Estee Doyle CSA  Orientee: Santos Toro RN    Estimated Blood Loss: 10 mL    Specimen: None    Complications: None    Findings: Good thrill in graft, with palpable left radial pulse following procedure.    Implants:   Implant Name Type Inv. Item Serial No.  Lot No. LRB No. Used Action   GRFT VASC PROPAT HEP IR 4/7 38 45CM - [id number] - [id number] Implant GRFT VASC PROPAT HEP IR 4/7 38 45CM [id number] [employer]  Left 1 Implanted   CLIPAPPLR M/ ENDO LIGACLIP 9 3/8IN SM - [id number] Implant CLIPAPPLR M/ ENDO LIGACLIP 9 3/8IN SM  [employer]  DIV [employer] J [employer] J [id number] Left 1 Implanted   HEMOST ABS SURGICEL 2X14 - [id number] Implant HEMOST ABS SURGICEL 2X14  [employer]  DIV [employer] J [employer] J [id number] Left 1 Implanted     Indications: 77-year-old woman with end-stage renal disease, previously maintained on peritoneal dialysis.  Has been transitioned to hemodialysis because of poor clearance.  Vein mapping has not demonstrated any veins suitable for AV access creation.  Submits now for AV graft placement       Procedure:  The patient was given Kefzol IV.  She was taken to the operating room and positioned supine the operating table.  After the induction of general anesthesia by laryngeal mask airway the left upper extremity was extended onto an arm table.  The limb was washed carefully with Hibiclens and then prepared with ChloraPrep and draped in a sterile fashion.    A longitudinal incision was made  in the distal arm over the brachial artery pulse.  The subcutaneous tissue was dissected, the nerves were identified and gently retracted laterally.  The brachial artery was controlled.  A longitudinal incision was made in the axilla, and the fascia was incised.  The axillary vein was isolated.  Both the artery and the vein were felt to be adequate for AV graft placement.  A subcutaneous tunnel was created connecting these 2 incisions and a 4 to 7 mm tapered PTFE graft was positioned in the subcutaneous tunnel, oriented with the 4mm and in the distal arm near the brachial artery in the 7 mm and in the proximal arm near the axillary vein.    The patient was given heparin 5000 units IV.  After adequate circulation time the axillary vein was clamped and a longitudinal venotomy was made.  The graft was trimmed and an end-to-side anastomosis was created between the 7 mm end of the PTFE graft and the side of the axillary vein with continuous 5-0 Prolene suture.  The graft was clamped and the venous clamps were removed.  Minor suture hole bleeding was encountered, which was controlled with digital pressure.  Next the brachial artery was clamped proximally and distally and a longitudinal brachial arteriotomy was made.  The graft was trimmed and an end-to-side anastomosis was created between the side of the distal brachial artery and the 4 mm end of the PTFE graft.  Flushing maneuvers were performed.  Antegrade flow was restored first to the graft and then to the hand.  Good thrill was palpated in the graft.  The configuration of the graft was very favorable.  Multiphasic arterial Doppler signals were present in the radial and ulnar arteries.  A radial artery pulse was palpated.  The heparin was partially reversed with protamine 30 mg IV.  Surgicel was applied to the suture lines.  When hemostasis was seen to be complete, the incisions were closed in layers with Vicryl sutures.  Dermal adhesive was applied.  At its  conclusion the patient had tolerated the procedure well, and without apparent complications.  Sponge and needle counts were correct.  The patient was taken to the recovery area in stable condition.    Liborio Dumont MD     Date: 6/7/2022  Time: 10:48 EDT

## 2022-06-07 NOTE — ADDENDUM NOTE
Addendum  created 06/07/22 1220 by Gian Dumont MD    Attestation recorded in Intraprocedure, Intraprocedure Attestations filed

## 2022-06-07 NOTE — ANESTHESIA PROCEDURE NOTES
Airway  Urgency: elective    Date/Time: 6/7/2022 9:06 AM  Airway not difficult    General Information and Staff    Patient location during procedure: OR  CRNA/CAA: Gena Foss CRNA    Indications and Patient Condition    Preoxygenated: yes  Mask difficulty assessment: 1 - vent by mask    Final Airway Details  Final airway type: endotracheal airway      Successful airway: ETT  Cuffed: yes   Successful intubation technique: direct laryngoscopy  Endotracheal tube insertion site: oral  Blade: Nilam  Blade size: 3  ETT size (mm): 7.0  Cormack-Lehane Classification: grade IIa - partial view of glottis  Placement verified by: chest auscultation and capnometry   Measured from: teeth  ETT/EBT  to teeth (cm): 21  Number of attempts at approach: 1  Assessment: lips, teeth, and gum same as pre-op and atraumatic intubation

## 2022-07-07 ENCOUNTER — APPOINTMENT (OUTPATIENT)
Dept: WOMENS IMAGING | Facility: HOSPITAL | Age: 77
End: 2022-07-07

## 2022-07-07 PROCEDURE — 77063 BREAST TOMOSYNTHESIS BI: CPT | Performed by: RADIOLOGY

## 2022-07-07 PROCEDURE — 77067 SCR MAMMO BI INCL CAD: CPT | Performed by: RADIOLOGY

## 2023-05-27 ENCOUNTER — APPOINTMENT (OUTPATIENT)
Dept: CT IMAGING | Facility: HOSPITAL | Age: 78
End: 2023-05-27
Payer: MEDICARE

## 2023-05-27 ENCOUNTER — HOSPITAL ENCOUNTER (EMERGENCY)
Facility: HOSPITAL | Age: 78
Discharge: HOME | End: 2023-05-28
Attending: EMERGENCY MEDICINE
Payer: MEDICARE

## 2023-05-27 DIAGNOSIS — S00.83XA CONTUSION OF FACE, INITIAL ENCOUNTER: ICD-10-CM

## 2023-05-27 DIAGNOSIS — S02.40DA CLOSED FRACTURE OF LEFT SIDE OF MAXILLA, INITIAL ENCOUNTER: Primary | ICD-10-CM
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
## 2023-05-27 LAB
ALBUMIN SERPL-MCNC: 4.7 G/DL (ref 3.5–5.2)
ALBUMIN/GLOB SERPL: 2 G/DL
ALP SERPL-CCNC: 94 U/L (ref 39–117)
ALT SERPL W P-5'-P-CCNC: 21 U/L (ref 1–33)
ANION GAP SERPL CALCULATED.3IONS-SCNC: 13.9 MMOL/L (ref 5–15)
AST SERPL-CCNC: 20 U/L (ref 1–32)
BASOPHILS # BLD AUTO: 0.04 10*3/MM3 (ref 0–0.2)
BASOPHILS NFR BLD AUTO: 0.6 % (ref 0–1.5)
BILIRUB SERPL-MCNC: 0.6 MG/DL (ref 0–1.2)
BUN SERPL-MCNC: 38 MG/DL (ref 8–23)
BUN/CREAT SERPL: 6.7 (ref 7–25)
CALCIUM SPEC-SCNC: 10 MG/DL (ref 8.6–10.5)
CHLORIDE SERPL-SCNC: 98 MMOL/L (ref 98–107)
CO2 SERPL-SCNC: 30.1 MMOL/L (ref 22–29)
CREAT SERPL-MCNC: 5.65 MG/DL (ref 0.57–1)
DEPRECATED RDW RBC AUTO: 54.9 FL (ref 37–54)
EGFRCR SERPLBLD CKD-EPI 2021: 7.3 ML/MIN/1.73
EOSINOPHIL # BLD AUTO: 0.34 10*3/MM3 (ref 0–0.4)
EOSINOPHIL NFR BLD AUTO: 5.2 % (ref 0.3–6.2)
ERYTHROCYTE [DISTWIDTH] IN BLOOD BY AUTOMATED COUNT: 15.8 % (ref 12.3–15.4)
GLOBULIN UR ELPH-MCNC: 2.4 GM/DL
GLUCOSE SERPL-MCNC: 117 MG/DL (ref 65–99)
HCT VFR BLD AUTO: 31.2 % (ref 34–46.6)
HGB BLD-MCNC: 10.2 G/DL (ref 12–15.9)
IMM GRANULOCYTES # BLD AUTO: 0.03 10*3/MM3 (ref 0–0.05)
IMM GRANULOCYTES NFR BLD AUTO: 0.5 % (ref 0–0.5)
LYMPHOCYTES # BLD AUTO: 0.88 10*3/MM3 (ref 0.7–3.1)
LYMPHOCYTES NFR BLD AUTO: 13.5 % (ref 19.6–45.3)
MCH RBC QN AUTO: 31.1 PG (ref 26.6–33)
MCHC RBC AUTO-ENTMCNC: 32.7 G/DL (ref 31.5–35.7)
MCV RBC AUTO: 95.1 FL (ref 79–97)
MONOCYTES # BLD AUTO: 0.58 10*3/MM3 (ref 0.1–0.9)
MONOCYTES NFR BLD AUTO: 8.9 % (ref 5–12)
NEUTROPHILS NFR BLD AUTO: 4.67 10*3/MM3 (ref 1.7–7)
NEUTROPHILS NFR BLD AUTO: 71.3 % (ref 42.7–76)
NRBC BLD AUTO-RTO: 0 /100 WBC (ref 0–0.2)
PLATELET # BLD AUTO: 168 10*3/MM3 (ref 140–450)
PMV BLD AUTO: 9.9 FL (ref 6–12)
POTASSIUM SERPL-SCNC: 4.5 MMOL/L (ref 3.5–5.2)
PROT SERPL-MCNC: 7.1 G/DL (ref 6–8.5)
RBC # BLD AUTO: 3.28 10*6/MM3 (ref 3.77–5.28)
SODIUM SERPL-SCNC: 142 MMOL/L (ref 136–145)
WBC NRBC COR # BLD: 6.54 10*3/MM3 (ref 3.4–10.8)

## 2023-05-27 PROCEDURE — 99283 EMERGENCY DEPT VISIT LOW MDM: CPT

## 2023-05-27 PROCEDURE — 96376 TX/PRO/DX INJ SAME DRUG ADON: CPT

## 2023-05-27 PROCEDURE — 25010000002 TETANUS-DIPHTH-ACELL PERTUSSIS 5-2.5-18.5 LF-MCG/0.5 SUSPENSION PREFILLED SYRINGE: Performed by: EMERGENCY MEDICINE

## 2023-05-27 PROCEDURE — 25010000002 MORPHINE PER 10 MG: Performed by: EMERGENCY MEDICINE

## 2023-05-27 PROCEDURE — 25010000002 ONDANSETRON PER 1 MG: Performed by: EMERGENCY MEDICINE

## 2023-05-27 PROCEDURE — 90471 IMMUNIZATION ADMIN: CPT | Performed by: EMERGENCY MEDICINE

## 2023-05-27 PROCEDURE — 72125 CT NECK SPINE W/O DYE: CPT

## 2023-05-27 PROCEDURE — 96375 TX/PRO/DX INJ NEW DRUG ADDON: CPT

## 2023-05-27 PROCEDURE — 70486 CT MAXILLOFACIAL W/O DYE: CPT

## 2023-05-27 PROCEDURE — 85025 COMPLETE CBC W/AUTO DIFF WBC: CPT | Performed by: EMERGENCY MEDICINE

## 2023-05-27 PROCEDURE — 70450 CT HEAD/BRAIN W/O DYE: CPT

## 2023-05-27 PROCEDURE — 80053 COMPREHEN METABOLIC PANEL: CPT | Performed by: EMERGENCY MEDICINE

## 2023-05-27 PROCEDURE — 96374 THER/PROPH/DIAG INJ IV PUSH: CPT

## 2023-05-27 PROCEDURE — 90715 TDAP VACCINE 7 YRS/> IM: CPT | Performed by: EMERGENCY MEDICINE

## 2023-05-27 RX ORDER — ROPINIROLE 0.25 MG/1
0.25 TABLET, FILM COATED ORAL NIGHTLY
COMMUNITY

## 2023-05-27 RX ORDER — HYDROCODONE BITARTRATE AND ACETAMINOPHEN 5; 325 MG/1; MG/1
1 TABLET ORAL EVERY 6 HOURS PRN
Qty: 12 TABLET | Refills: 0 | Status: SHIPPED | OUTPATIENT
Start: 2023-05-27 | End: 2023-05-30

## 2023-05-27 RX ORDER — MORPHINE SULFATE 2 MG/ML
2 INJECTION, SOLUTION INTRAMUSCULAR; INTRAVENOUS ONCE
Status: COMPLETED | OUTPATIENT
Start: 2023-05-27 | End: 2023-05-27

## 2023-05-27 RX ORDER — CARVEDILOL 12.5 MG/1
12.5 TABLET ORAL DAILY
COMMUNITY

## 2023-05-27 RX ORDER — ONDANSETRON 2 MG/ML
4 INJECTION INTRAMUSCULAR; INTRAVENOUS ONCE
Status: COMPLETED | OUTPATIENT
Start: 2023-05-27 | End: 2023-05-27

## 2023-05-27 RX ORDER — SEVELAMER CARBONATE FOR ORAL SUSPENSION 2400 MG/1
2400 POWDER, FOR SUSPENSION ORAL
COMMUNITY

## 2023-05-27 RX ORDER — OMEPRAZOLE 20 MG/1
20 CAPSULE, DELAYED RELEASE ORAL DAILY
COMMUNITY

## 2023-05-27 RX ADMIN — TETANUS TOXOID, REDUCED DIPHTHERIA TOXOID AND ACELLULAR PERTUSSIS VACCINE, ADSORBED 0.5 ML: 5; 2.5; 8; 8; 2.5 SUSPENSION INTRAMUSCULAR at 22:10

## 2023-05-27 RX ADMIN — MORPHINE SULFATE 2 MG: 2 INJECTION, SOLUTION INTRAMUSCULAR; INTRAVENOUS at 22:10

## 2023-05-27 RX ADMIN — ONDANSETRON 4 MG: 2 INJECTION INTRAMUSCULAR; INTRAVENOUS at 22:10

## 2023-05-27 RX ADMIN — MORPHINE SULFATE 2 MG: 2 INJECTION, SOLUTION INTRAMUSCULAR; INTRAVENOUS at 23:21

## 2023-05-28 VITALS
RESPIRATION RATE: 16 BRPM | OXYGEN SATURATION: 97 % | HEART RATE: 84 BPM | WEIGHT: 110.23 LBS | HEIGHT: 63 IN | SYSTOLIC BLOOD PRESSURE: 138 MMHG | DIASTOLIC BLOOD PRESSURE: 84 MMHG | BODY MASS INDEX: 19.53 KG/M2 | TEMPERATURE: 97.3 F

## 2023-05-28 RX ORDER — AMOXICILLIN 500 MG/1
500 CAPSULE ORAL 2 TIMES DAILY
Qty: 14 CAPSULE | Refills: 0 | Status: SHIPPED | OUTPATIENT
Start: 2023-05-28

## 2023-05-28 NOTE — ED PROVIDER NOTES
EMERGENCY DEPARTMENT ENCOUNTER    Room Number:  08/08  Date of encounter:  5/28/2023  PCP: Lilibeth Cota MD  Patient Care Team:  Lilibeth Cota MD as PCP - General (Family Medicine)  Leandro Rodriguez MD as Consulting Physician (Hematology and Oncology)  Carlos Schwab MD as Consulting Physician (Nephrology)   Independent Historians: Patient    HPI:  Chief Complaint: Fall   A complete HPI/ROS/PMH/PSH/SH/FH are unobtainable due to: N/A    Chronic or social conditions impacting patient care (social determinants of health): Dialysis patient    Context: Fany Todd is a 78 y.o. female with past medical history of ESRD on HD, HTN, and HLD who arrives to the ED with complaint of left-sided facial injury after a fall.  Patient states that she was at home, working in the garden, when she tripped over a tree root and struck her left side of her face on concrete.  Denies any loss of consciousness, headache, neck pain, or other injuries.  Patient has a small laceration to her upper lip, and internal upper lip laceration, and a large amount of left-sided ecchymosis and hematoma.  No injury to the teeth, unsure of last tetanus vaccination.    Review of prior external notes (non-ED): No record of patient's previous tetanus.    Review of prior external test results outside of this encounter: None    PAST MEDICAL HISTORY  Active Ambulatory Problems     Diagnosis Date Noted   • Anemia 11/04/2016   • History of breast cancer 11/04/2016   • ESRD (end stage renal disease) 12/26/2021   • Generalized abdominal pain 01/10/2022   • Heme positive stool 01/14/2022   • Generalized weakness 01/30/2022     Resolved Ambulatory Problems     Diagnosis Date Noted   • No Resolved Ambulatory Problems     Past Medical History:   Diagnosis Date   • Anesthesia    • Breast cancer 2015   • Chronic renal insufficiency    • Disease of thyroid gland    • End stage renal disease    • GERD (gastroesophageal reflux disease)    • History of  Clostridioides difficile colitis 01/15/2022   • History of transfusion 01/2022   • Hyperlipidemia    • Hypertension    • Peritoneal dialysis catheter in place        The patient has a COVID HM Topic on their chart, and they are fully vaccinated.    PAST SURGICAL HISTORY  Past Surgical History:   Procedure Laterality Date   • APPENDECTOMY     • ARTERIOVENOUS FISTULA/SHUNT SURGERY Left 6/7/2022    Procedure: LEFT BRACHIAL AXILLARY ARTERIOVENOUS SHUNT;  Surgeon: Liborio Dumont MD;  Location: [facility] MAIN OR;  Service: Vascular;  Laterality: Left;   • BREAST BIOPSY Left 2015   • COLONOSCOPY     • COLONOSCOPY N/A 01/15/2022    Procedure: COLONOSCOPY TO CECUM;  Surgeon: Gilmar Rosen MD;  Location: [facility] ENDOSCOPY;  Service: Gastroenterology;  Laterality: N/A;  GI BLEED  --HEMATIN    • ENDOSCOPY N/A 01/15/2022    Procedure: ESOPHAGOGASTRODUODENOSCOPY;  Surgeon: Gilmar Rosen MD;  Location: [facility] ENDOSCOPY;  Service: Gastroenterology;  Laterality: N/A;  GI BLEED  --DUODENAL ULCER    • FOOT SURGERY Right 2011    BROKEN FOOT    • INSERTION PERITONEAL DIALYSIS CATHETER N/A 12/27/2021    Procedure: REMOVAL AND PLACEMENT OF PERITONEAL DIALYSIS CATHETER LAPAROSCOPIC;  Surgeon: Pablo Severino MD;  Location: [facility] MAIN OR;  Service: General;  Laterality: N/A;   • INSERTION PERITONEAL DIALYSIS CATHETER N/A 05/03/2022    Procedure: REMOVAL PERITONEAL DIALYSIS CATHETER;  Surgeon: Pablo Severino MD;  Location: [facility] MAIN OR;  Service: General;  Laterality: N/A;   • MASTECTOMY Left 2015   • TUBAL ABDOMINAL LIGATION     • TUNNELED VENOUS CATHETER PLACEMENT Right 05/2022         FAMILY HISTORY  Family History   Problem Relation Age of Onset   • Cervical cancer Mother    • Hypertension Father    • Kidney cancer Father    • Kidney disease Father    • Breast cancer Sister    • Hypertension Sister    • Heart disease Maternal Aunt    • Cancer Maternal Aunt    • Cancer Maternal Uncle    • Heart disease  Maternal Grandmother    • Cancer Cousin    • Malig Hyperthermia Neg Hx          SOCIAL HISTORY  Social History     Socioeconomic History   • Marital status: Single   • Years of education: High school   Tobacco Use   • Smoking status: Never   • Smokeless tobacco: Never   Vaping Use   • Vaping Use: Never used   Substance and Sexual Activity   • Alcohol use: No   • Drug use: No   • Sexual activity: Defer         ALLERGIES  Patient has no known allergies.        REVIEW OF SYSTEMS  Review of Systems     All systems reviewed and negative except for those discussed in HPI.       PHYSICAL EXAM    I have reviewed the triage vital signs and nursing notes.    ED Triage Vitals [05/27/23 2053]   Temp Heart Rate Resp BP SpO2   97.3 °F (36.3 °C) 91 16 -- 91 %      Temp src Heart Rate Source Patient Position BP Location FiO2 (%)   Tympanic Monitor -- -- --       Physical Exam    GENERAL: alert and oriented x4, mildly distressed  HENT: normocephalic, large amount of left-sided facial ecchymosis, hematoma, abrasion to the left upper lip, small internal left upper lip laceration, no hemotympanum, no C-spine tenderness, no skull fracture  EYES: no scleral icterus, PERRL, EOMI  CV: regular rhythm, regular rate, fistula to the LUE with good thrill  RESPIRATORY: normal effort, CTAB  ABDOMEN: soft/nontender  MUSCULOSKELETAL: no deformity, normal ROM, no bony tenderness  NEURO: alert, moves all extremities, no focal neuro deficits, follows commands  SKIN: warm, dry, no rash, see above  Psych: Appropriate mood and affect      Nursing notes and vital signs reviewed      LAB RESULTS  Recent Results (from the past 24 hour(s))   Comprehensive Metabolic Panel    Collection Time: 05/27/23  9:19 PM    Specimen: Blood   Result Value Ref Range    Glucose 117 (H) 65 - 99 mg/dL    BUN 38 (H) 8 - 23 mg/dL    Creatinine 5.65 (H) 0.57 - 1.00 mg/dL    Sodium 142 136 - 145 mmol/L    Potassium 4.5 3.5 - 5.2 mmol/L    Chloride 98 98 - 107 mmol/L    CO2 30.1  (H) 22.0 - 29.0 mmol/L    Calcium 10.0 8.6 - 10.5 mg/dL    Total Protein 7.1 6.0 - 8.5 g/dL    Albumin 4.7 3.5 - 5.2 g/dL    ALT (SGPT) 21 1 - 33 U/L    AST (SGOT) 20 1 - 32 U/L    Alkaline Phosphatase 94 39 - 117 U/L    Total Bilirubin 0.6 0.0 - 1.2 mg/dL    Globulin 2.4 gm/dL    A/G Ratio 2.0 g/dL    BUN/Creatinine Ratio 6.7 (L) 7.0 - 25.0    Anion Gap 13.9 5.0 - 15.0 mmol/L    eGFR 7.3 (L) >60.0 mL/min/1.73   CBC Auto Differential    Collection Time: 05/27/23  9:19 PM    Specimen: Blood   Result Value Ref Range    WBC 6.54 3.40 - 10.80 10*3/mm3    RBC 3.28 (L) 3.77 - 5.28 10*6/mm3    Hemoglobin 10.2 (L) 12.0 - 15.9 g/dL    Hematocrit 31.2 (L) 34.0 - 46.6 %    MCV 95.1 79.0 - 97.0 fL    MCH 31.1 26.6 - 33.0 pg    MCHC 32.7 31.5 - 35.7 g/dL    RDW 15.8 (H) 12.3 - 15.4 %    RDW-SD 54.9 (H) 37.0 - 54.0 fl    MPV 9.9 6.0 - 12.0 fL    Platelets 168 140 - 450 10*3/mm3    Neutrophil % 71.3 42.7 - 76.0 %    Lymphocyte % 13.5 (L) 19.6 - 45.3 %    Monocyte % 8.9 5.0 - 12.0 %    Eosinophil % 5.2 0.3 - 6.2 %    Basophil % 0.6 0.0 - 1.5 %    Immature Grans % 0.5 0.0 - 0.5 %    Neutrophils, Absolute 4.67 1.70 - 7.00 10*3/mm3    Lymphocytes, Absolute 0.88 0.70 - 3.10 10*3/mm3    Monocytes, Absolute 0.58 0.10 - 0.90 10*3/mm3    Eosinophils, Absolute 0.34 0.00 - 0.40 10*3/mm3    Basophils, Absolute 0.04 0.00 - 0.20 10*3/mm3    Immature Grans, Absolute 0.03 0.00 - 0.05 10*3/mm3    nRBC 0.0 0.0 - 0.2 /100 WBC       Ordered the above labs and independently reviewed and interpreted the results by me.        RADIOLOGY  CT Head Without Contrast, CT Facial Bones Without Contrast    Result Date: 5/27/2023  CT OF THE HEAD WITHOUT CONTRAST; CT FACIAL BONES  HISTORY: Fell and hit cement block  COMPARISON: None available.  TECHNIQUE: Axial CT imaging was obtained through the brain. No IV contrast was administered. Axial CT imaging was obtained through the facial bones. Coronal and sagittal reformatted images were obtained.  FINDINGS: CT HEAD:  No acute intracranial hemorrhage is seen. There is atrophy. There is periventricular and deep white matter microangiopathic change. There is no midline shift or mass effect. There is opacification of the right mastoid air cells, which has increased when compared to prior exam. Left mastoid effusion appears improved. No calvarial fracture is seen.  CT FACIAL BONES: There are comminuted fractures of the anterior wall of the left maxillary sinus there is an associated air-fluid level within the left maxillary sinus, in keeping with hemorrhage. There is mild mucosal thickening within the ethmoid sinuses, as well as the right maxillary sinus. The patient is noted to have extensive left-sided facial soft tissue swelling and hemorrhage. This extends from the left periorbital region, over the left maxilla, as well as the left side of the mandible. There is a large hematoma noted anterior to the left maxillary fractures, which measures at least 3.8 x 2.6 cm. The orbits themselves appear intact. No postseptal extension is seen. No mandibular fracture is identified. The patient has asymmetric degenerative changes involving the right TMJ.       1. No acute intracranial findings. 2. Comminuted fractures of the anterior wall of the left maxillary sinus, with extensive overlying soft tissue swelling and hemorrhage.  Radiation dose reduction techniques were utilized, including automated exposure control and exposure modulation based on body size.  This report was finalized on 5/27/2023 10:52 PM by Dr. Dian Condon M.D.      CT Cervical Spine Without Contrast    Result Date: 5/27/2023  CT OF THE CERVICAL SPINE  HISTORY: Fall  COMPARISON: None available.  TECHNIQUE: Axial CT imaging was obtained through the cervical spine. Coronal and sagittal reformatted images were obtained.  FINDINGS: No acute fracture or subluxation of the cervical spine is seen. There is mild anterolisthesis of C3 on 4 and C4 on C5. Intervertebral disc  space narrowing is most significant at C5-C6. There is no prevertebral soft tissue swelling. Facet degenerative changes are most significant at C3-C4, where there appears to be some erosive change. There is neural foraminal narrowing noted at multiple levels, but most significant at C4-C5 and C5-C6 on the left. Mild canal narrowing is noted at multiple levels, but no significant C5-C6. Images through the lung apices demonstrate some atelectasis.      No acute fracture or subluxation identified.  Radiation dose reduction techniques were utilized, including automated exposure control and exposure modulation based on body size.  This report was finalized on 5/27/2023 10:57 PM by Dr. Dian Condon M.D.        I ordered the above noted radiological studies.  These were independently interpreted and reviewed by me.  See dictation for official radiology interpretation.      PROCEDURES    Procedures      MEDICATIONS GIVEN IN ER    Medications   Tetanus-Diphth-Acell Pertussis (BOOSTRIX) injection 0.5 mL (0.5 mL Intramuscular Given 5/27/23 2210)   ondansetron (ZOFRAN) injection 4 mg (4 mg Intravenous Given 5/27/23 2210)   morphine injection 2 mg (2 mg Intravenous Given 5/27/23 2210)   morphine injection 2 mg (2 mg Intravenous Given 5/27/23 2321)         PROGRESS, DATA ANALYSIS, CONSULTS, AND MEDICAL DECISION MAKING    All labs have been independently reviewed by me.  All radiology studies have been reviewed by me and discussed with radiologist dictating the report.   EKG's independently viewed and interpreted by me.  Discussion below represents my analysis of pertinent findings related to patient's condition, differential diagnosis, treatment plan and final disposition.    DDx:  Includes, but is not limited to minor head injury, concussion, intracranial hemorrhage, skull fracture, C-spine injury, facial bone fracture, contusion, hematoma, laceration    After my initial assessment I am concerned about trauma and patient  may need hospitalization due to neurosurgery consultation    ED Course as of 05/28/23 0005   Sat May 27, 2023   2130 WBC: 6.54 [GILL]   2131 Hemoglobin(!): 10.2 [GILL]   2131 Hematocrit(!): 31.2 [GILL]   2131 Platelets: 168 [GILL]   2206 Glucose(!): 117 [GILL]   2206 BUN(!): 38 [GILL]   2206 Creatinine(!): 5.65 [GILL]   2206 Sodium: 142 [GILL]   2206 Potassium: 4.5 [GILL]   2206 Chloride: 98 [GILL]   2206 CO2(!): 30.1 [GILL]   2211 CT images intimately viewed by me, patient appears to have a maxillary floor sinus fracture, however I see no evidence for a C-spine fracture, or intracranial hemorrhage. [GILL]   2314 Patient rechecked, sitting in bed, no acute distress.  Discussed results, diagnosis, treatment plan, as well as strict return to ER precautions including avoiding use of a straw, no blowing of the nose, and need for follow-up with ENT for recheck.  Patient and family expressed understanding and agree with plan. [GILL]      ED Course User Index  [GILL] Obi Jackman PA       MDM: Patient's CT scans showed a maxillary wall sinus fracture, however there is no acute intracranial or C-spine injury.  Have had shared decision-making discussion with patient and family regarding care, return to ER precautions, and need for follow-up, they expressed understanding and are comfortable with discharge home.  Vital signs are stable.    PPE:  The patient wore a mask and I wore a mask and all appropriate PPE throughout the entire patient encounter.      AS OF 00:05 EDT VITALS:    BP - (!) 158/102  HR - 90  TEMP - 97.3 °F (36.3 °C) (Tympanic)  O2 SATS - 97%      DIAGNOSIS  Final diagnoses:   Closed fracture of left side of maxilla, initial encounter   Contusion of face, initial encounter   Fall, initial encounter         DISPOSITION  DISCHARGE    Patient discharged in stable condition.    Reviewed implications of results, diagnosis, meds, responsibility to follow up, warning signs and symptoms of possible worsening, potential complications and  reasons to return to ER.    Patient/Family voiced understanding of above instructions.    Discussed plan for discharge, as there is no emergent indication for admission. Patient referred to primary care provider for BP management due to today's BP. Pt/family is agreeable and understands need for follow up and repeat testing.  Pt is aware that discharge does not mean that nothing is wrong but it indicates no emergency is present that requires admission and they must continue care with follow-up as given below or physician of their choice.     FOLLOW-UP  Chuckie Suarez MD  [address] [facility] [address]  [facility] [id number] [phone]    Schedule an appointment as soon as possible for a visit       Lilibeth Cota MD  [address] [facility] [address]  [facility] [id number] [phone]    Schedule an appointment as soon as possible for a visit            Medication List      New Prescriptions    amoxicillin 500 MG capsule  Commonly known as: AMOXIL  Take 1 capsule by mouth 2 (Two) Times a Day.     HYDROcodone-acetaminophen 5-325 MG per tablet  Commonly known as: NORCO  Take 1 tablet by mouth Every 6 (Six) Hours As Needed for Severe Pain for up to 3 days.           Where to Get Your Medications      These medications were sent to [id number] - [place], [place] - [address] AT [facility] [phone] [facility] [phone]   [address], [facility] [address]    Phone: [phone]   · amoxicillin 500 MG capsule  · HYDROcodone-acetaminophen 5-325 MG per tablet           Note Disclaimer: At [facility], we believe that sharing information builds trust and better relationships. You are receiving this note because you recently visited [facility]. It is possible you will see health information before a provider has talked with you about it. This kind of information can be easy to misunderstand. To help you fully understand what it means for your health, we urge you  to discuss this note with your provider.     Obi Jackman PA  05/28/23 0005

## 2023-05-28 NOTE — DISCHARGE INSTRUCTIONS
Home, rest, medicine as directed, pain medicine as needed, apply ice to affected area, keep elevated and watch carefully for any signs of worsening pain.  Do not blow your nose, use straws, and rinse mouth out with gentle salt water after eating.  Follow-up with ENT next week for recheck.  Home medicine as prescribed, follow up with PCP for recheck. Return to care with further concerns.

## 2023-05-28 NOTE — ED NOTES
Per patient, she tripped and fell in her yard.  Patient denies LOC.  Patient denies being on blood thinners. Pt has swelling to the left side of her face and c/o h/a 8:10.  Pt's left eye is swollen shut.

## 2023-05-28 NOTE — ED PROVIDER NOTES
MD ATTESTATION NOTE    The RICHIE and I have discussed this patient's history, physical exam, and treatment plan.  I have reviewed the documentation and personally had a face to face interaction with the patient. I affirm the documentation and agree with the treatment and plan.  The attached note describes my personal findings.      I provided a substantive portion of the care of the patient.  I personally performed the physical exam in its entirety, and below are my findings.  For this patient encounter, the patient wore surgical mask, I wore full protective PPE including N95 and eye protection.      Brief HPI: This patient presents for evaluation of an accidental fall with facial injury.  She was outside walking this evening around 7 PM.  She actually tripped over a tree root that was sticking out of the ground.  Unfortunately, this caused her to fall forward and strike her face against a landscaping concrete block.  She did not have any loss of consciousness.  She had immediate pain to the left-sided face.  She also had a small abrasion to her left wrist and right knee area.     PHYSICAL EXAM  ED Triage Vitals   Temp Heart Rate Resp BP SpO2   05/27/23 2053 05/27/23 2053 05/27/23 2053 05/27/23 2101 05/27/23 2053   97.3 °F (36.3 °C) 91 16 (!) 191/87 91 %      Temp src Heart Rate Source Patient Position BP Location FiO2 (%)   05/27/23 2053 05/27/23 2053 -- -- --   Tympanic Monitor            GENERAL: Appears uncomfortable, lying on her right side in bed  HENT: nares patent, significantly large hematoma noted to the left hemiface extending from the periorbital soft tissues all the way to the mandibular margin with bruising.  Very tender throughout.  EYES: no scleral icterus, the left ocular exam is limited due to periorbital edema and bruising.  CV: Normal pulses, normal rate  RESPIRATORY: normal effort, no stridor  MUSCULOSKELETAL: no deformity  NEURO: alert, moves all extremities, follows commands  PSYCH:  calm,  cooperative  SKIN: warm, dry    Vital signs and nursing notes reviewed.        Plan: CT scan imaging ordered for head and face and C-spine to evaluate for intracranial injury or facial bone fracture.  Basic labs also drawn.  Patient has end-stage renal disease on dialysis and is due to dialyze on Monday.  Given the extent of her soft tissue injury, we may need to bring her in to the hospital for symptomatic management tonight.     Morgan Topete MD  05/27/23 9148

## 2023-11-17 ENCOUNTER — HOSPITAL ENCOUNTER (EMERGENCY)
Facility: HOSPITAL | Age: 78
Discharge: HOME | End: 2023-11-17
Attending: STUDENT IN AN ORGANIZED HEALTH CARE EDUCATION/TRAINING PROGRAM
Payer: MEDICARE

## 2023-11-17 VITALS
DIASTOLIC BLOOD PRESSURE: 78 MMHG | OXYGEN SATURATION: 98 % | TEMPERATURE: 96.1 F | HEART RATE: 70 BPM | SYSTOLIC BLOOD PRESSURE: 178 MMHG | RESPIRATION RATE: 18 BRPM

## 2023-11-17 DIAGNOSIS — T82.868A THROMBOSIS OF ARTERIOVENOUS GRAFT, INITIAL ENCOUNTER: Primary | ICD-10-CM

## 2023-11-17 LAB
ALBUMIN SERPL-MCNC: 4.6 G/DL (ref 3.5–5.2)
ALBUMIN/GLOB SERPL: 1.9 G/DL
ALP SERPL-CCNC: 105 U/L (ref 39–117)
ALT SERPL W P-5'-P-CCNC: 12 U/L (ref 1–33)
ANION GAP SERPL CALCULATED.3IONS-SCNC: 14.8 MMOL/L (ref 5–15)
AST SERPL-CCNC: 24 U/L (ref 1–32)
BASOPHILS # BLD AUTO: 0.04 10*3/MM3 (ref 0–0.2)
BASOPHILS NFR BLD AUTO: 0.8 % (ref 0–1.5)
BILIRUB SERPL-MCNC: 0.5 MG/DL (ref 0–1.2)
BUN SERPL-MCNC: 51 MG/DL (ref 8–23)
BUN/CREAT SERPL: 6.2 (ref 7–25)
CALCIUM SPEC-SCNC: 9.6 MG/DL (ref 8.6–10.5)
CHLORIDE SERPL-SCNC: 97 MMOL/L (ref 98–107)
CO2 SERPL-SCNC: 26.2 MMOL/L (ref 22–29)
CREAT SERPL-MCNC: 8.24 MG/DL (ref 0.57–1)
DEPRECATED RDW RBC AUTO: 60.3 FL (ref 37–54)
EGFRCR SERPLBLD CKD-EPI 2021: 4.6 ML/MIN/1.73
EOSINOPHIL # BLD AUTO: 0.46 10*3/MM3 (ref 0–0.4)
EOSINOPHIL NFR BLD AUTO: 9.7 % (ref 0.3–6.2)
ERYTHROCYTE [DISTWIDTH] IN BLOOD BY AUTOMATED COUNT: 16.7 % (ref 12.3–15.4)
GLOBULIN UR ELPH-MCNC: 2.4 GM/DL
GLUCOSE SERPL-MCNC: 95 MG/DL (ref 65–99)
HCT VFR BLD AUTO: 33.4 % (ref 34–46.6)
HGB BLD-MCNC: 11.2 G/DL (ref 12–15.9)
IMM GRANULOCYTES # BLD AUTO: 0.01 10*3/MM3 (ref 0–0.05)
IMM GRANULOCYTES NFR BLD AUTO: 0.2 % (ref 0–0.5)
LYMPHOCYTES # BLD AUTO: 0.56 10*3/MM3 (ref 0.7–3.1)
LYMPHOCYTES NFR BLD AUTO: 11.8 % (ref 19.6–45.3)
MCH RBC QN AUTO: 33 PG (ref 26.6–33)
MCHC RBC AUTO-ENTMCNC: 33.5 G/DL (ref 31.5–35.7)
MCV RBC AUTO: 98.5 FL (ref 79–97)
MONOCYTES # BLD AUTO: 0.43 10*3/MM3 (ref 0.1–0.9)
MONOCYTES NFR BLD AUTO: 9.1 % (ref 5–12)
NEUTROPHILS NFR BLD AUTO: 3.24 10*3/MM3 (ref 1.7–7)
NEUTROPHILS NFR BLD AUTO: 68.4 % (ref 42.7–76)
NRBC BLD AUTO-RTO: 0 /100 WBC (ref 0–0.2)
PLATELET # BLD AUTO: 147 10*3/MM3 (ref 140–450)
PMV BLD AUTO: 10.5 FL (ref 6–12)
POTASSIUM SERPL-SCNC: 5.2 MMOL/L (ref 3.5–5.2)
PROT SERPL-MCNC: 7 G/DL (ref 6–8.5)
RBC # BLD AUTO: 3.39 10*6/MM3 (ref 3.77–5.28)
SODIUM SERPL-SCNC: 138 MMOL/L (ref 136–145)
WBC NRBC COR # BLD AUTO: 4.74 10*3/MM3 (ref 3.4–10.8)

## 2023-11-17 PROCEDURE — 36415 COLL VENOUS BLD VENIPUNCTURE: CPT

## 2023-11-17 PROCEDURE — 80053 COMPREHEN METABOLIC PANEL: CPT | Performed by: STUDENT IN AN ORGANIZED HEALTH CARE EDUCATION/TRAINING PROGRAM

## 2023-11-17 PROCEDURE — 99282 EMERGENCY DEPT VISIT SF MDM: CPT

## 2023-11-17 PROCEDURE — 85025 COMPLETE CBC W/AUTO DIFF WBC: CPT | Performed by: STUDENT IN AN ORGANIZED HEALTH CARE EDUCATION/TRAINING PROGRAM

## 2023-11-17 RX ORDER — SODIUM ZIRCONIUM CYCLOSILICATE 10 G/10G
10 POWDER, FOR SUSPENSION ORAL DAILY
Qty: 10 PACKET | Refills: 0 | Status: SHIPPED | OUTPATIENT
Start: 2023-11-17

## 2023-11-17 NOTE — ED NOTES
Patient presents from dialysis center. States they were unable to access AV fistula. C/o tenderness at site. Denies tingling. Warmth at site.

## 2023-11-17 NOTE — CONSULTS
Patient Name: Fany Todd Account #: [id number]    MRN: [id number] Admission Date: 2023      Consulting Service: Vascular Surgery Date of Evaluation: 2023    Requesting Provider: Dejuan Perdomo MD        [place]      CHIEF COMPLAINT: Thrombosed left AV shunt  HPI: Fany Todd is a 78 y.o. female is being seen for a consultation and evaluation/management of with thrombosed left AV shunt.  Patient had a shunt placed 2 years ago by Dr. Kee and was last seen in our office over a year ago by Dr. Martino.  For reasons are unclear the patient is been lost to follow-up and did not have surveillance.  She began having problems with her dialysis on Wednesday and our office was called and set her up for a shuntogram on this coming Tuesday.  Unfortunately she has progressed to thrombosis in the interim.  At this point time thrombectomy will be needed and percutaneous thrombectomy will be attempted on Monday and there are outpatient endovascular center as long as her current lab work is reasonable.  She does not retain fluid and her volume status is good.  If her potassium levels are okay she will be discharged from the ER and follow-up at 9 AM on Monday at our office for the percutaneous declot      PAST MEDICAL HISTORY:   Past Medical History:   Diagnosis Date    Anemia     Anesthesia     HARD TO WAKE UP    Breast cancer     Left, Stage I    Chronic renal insufficiency     Disease of thyroid gland     End stage renal disease     On dialysis since 2012--    GERD (gastroesophageal reflux disease)     History of Clostridioides difficile colitis 01/15/2022    History of transfusion 2022    NO REACTION    Hyperlipidemia     Hypertension     Peritoneal dialysis catheter in place       PAST SURGICAL HISTORY:   Past Surgical History:   Procedure Laterality Date    APPENDECTOMY      ARTERIOVENOUS FISTULA/SHUNT SURGERY Left 2022    Procedure: LEFT BRACHIAL AXILLARY ARTERIOVENOUS  SHUNT;  Surgeon: Liborio Dumont MD;  Location: [facility] MAIN OR;  Service: Vascular;  Laterality: Left;    BREAST BIOPSY Left 2015    COLONOSCOPY      COLONOSCOPY N/A 01/15/2022    Procedure: COLONOSCOPY TO CECUM;  Surgeon: Gilmar Rosen MD;  Location: [facility] ENDOSCOPY;  Service: Gastroenterology;  Laterality: N/A;  GI BLEED  --HEMATIN     ENDOSCOPY N/A 01/15/2022    Procedure: ESOPHAGOGASTRODUODENOSCOPY;  Surgeon: Gilmar Rosen MD;  Location: [facility] ENDOSCOPY;  Service: Gastroenterology;  Laterality: N/A;  GI BLEED  --DUODENAL ULCER     FOOT SURGERY Right 2011    BROKEN FOOT     INSERTION PERITONEAL DIALYSIS CATHETER N/A 12/27/2021    Procedure: REMOVAL AND PLACEMENT OF PERITONEAL DIALYSIS CATHETER LAPAROSCOPIC;  Surgeon: Pablo Severino MD;  Location: [facility] MAIN OR;  Service: General;  Laterality: N/A;    INSERTION PERITONEAL DIALYSIS CATHETER N/A 05/03/2022    Procedure: REMOVAL PERITONEAL DIALYSIS CATHETER;  Surgeon: Pablo Severino MD;  Location: [facility] MAIN OR;  Service: General;  Laterality: N/A;    MASTECTOMY Left 2015    TUBAL ABDOMINAL LIGATION      TUNNELED VENOUS CATHETER PLACEMENT Right 05/2022      FAMILY HISTORY:   Family History   Problem Relation Age of Onset    Cervical cancer Mother     Hypertension Father     Kidney cancer Father     Kidney disease Father     Breast cancer Sister     Hypertension Sister     Heart disease Maternal Aunt     Cancer Maternal Aunt     Cancer Maternal Uncle     Heart disease Maternal Grandmother     Cancer Cousin     Malig Hyperthermia Neg Hx       SOCIAL HISTORY:   Social History     Tobacco Use    Smoking status: Never    Smokeless tobacco: Never   Vaping Use    Vaping Use: Never used   Substance Use Topics    Alcohol use: No    Drug use: No      MEDICATIONS:   No current facility-administered medications on file prior to encounter.     Current Outpatient Medications on File Prior to Encounter   Medication Sig Dispense Refill     amLODIPine (NORVASC) 10 MG tablet Take 1 tablet by mouth Daily.      amoxicillin (AMOXIL) 500 MG capsule Take 1 capsule by mouth 2 (Two) Times a Day. 14 capsule 0    atorvastatin (LIPITOR) 20 MG tablet Take 1 tablet by mouth Daily.      B Complex-C-Folic Acid (RENAL MULTIVITAMIN FORMULA PO) Take 1 tablet by mouth Every Morning. With iron      calcitriol (ROCALTROL) 0.25 MCG capsule Take 1 capsule by mouth Daily.      carvedilol (COREG) 12.5 MG tablet Take 1 tablet by mouth Daily.      Cholecalciferol (VITAMIN D) 2000 units tablet Take 2,000 Units by mouth 3 (Three) Times a Day.      NON FORMULARY Cinacolcet 30 mg nightly      omeprazole (priLOSEC) 20 MG capsule Take 1 capsule by mouth Daily.      rOPINIRole (REQUIP) 0.25 MG tablet Take 1 tablet by mouth Every Night. Take 1 hour before bedtime.      sevelamer (RENVELA) 2.4 g pack packet Take 1 packet by mouth 3 (Three) Times a Day With Meals.               ALLERGIES: Patient has no known allergies.   COMPLETE REVIEW OF SYSTEMS:     ENT ROS: negative  Cardiovascular ROS: no chest pain or dyspnea on exertion  Respiratory ROS: no cough, shortness of breath, or wheezing  Gastrointestinal ROS: no abdominal pain, change in bowel habits, or black or bloody stools  Neurological ROS: no TIA or stroke symptoms  Genito-Urinary ROS: no dysuria, trouble voiding, or hematuria  Musculoskeletal ROS: negative  Dermatological ROS: negative  Psychological ROS: negative      PHYSICAL EXAM:   Patient Vitals for the past 24 hrs:   BP Temp Temp src Pulse Resp SpO2   11/17/23 1250 178/78 -- -- -- -- --   11/17/23 1239 -- 96.1 °F (35.6 °C) Tympanic 70 18 98 %        General appearance: alert, well appearing, and in no distress.  Neurological exam reveals alert, oriented, normal speech, no focal findings or movement disorder noted.  ENT exam reveals - ENT exam normal, no neck nodes or sinus tenderness.  CVS exam: normal rate, regular rhythm, normal S1, S2, no murmurs, rubs, clicks or  gallops.  Chest: clear to auscultation, no wheezes, rales or rhonchi, symmetric air entry.  Abdominal exam: soft, nontender, nondistended, no masses or organomegaly.  Examination of the feet reveals warm, good capillary refill.  Left AV shunt without thrill or bruit        LABS:      Results Review:       I reviewed the patient's new clinical results.  Results from last 7 days   Lab Units 11/17/23  1321   WBC 10*3/mm3 4.74   HEMOGLOBIN g/dL 11.2*   PLATELETS 10*3/mm3 147     CrCl cannot be calculated (Patient's most recent lab result is older than the maximum 30 days allowed.).          The following radiologic or non-invasive studies have been reviewed by me: None    Active Hospital Problems    Diagnosis  POA    Clotted renal dialysis arteriovenous graft [T82.868A]  Unknown      Resolved Hospital Problems   No resolved problems to display.         ASSESSMENT/PLAN: 78 y.o. female with thrombosed left AV shunt.  Thrombectomy is planned on Monday in our office outpatient in the vascular center percutaneously if the patient's labs are viable today.  We are awaiting repeat CMP is her first sample hemolyzed.  As long as the potassium is reasonable she will be discharged and follow-up on Monday.  Long-term follow-up will be needed to prevent further episodes.  She is aware of this.  At this point in time if her potassium is too high she will need to be admitted for tunnel catheter placement tomorrow.  If it is reasonable then she will be discharged from the ER with follow-up as above.  She is aware of all plans and is agreeable.      I discussed the plan with the patient and she is agreeable to the plan of care at this point. Thank you for this consult.     Javi Perry MD   11/17/23

## 2023-11-17 NOTE — CONSULTS
[facility] Consult Note      Patient Name: Fany Todd  : 1945  MRN: [id number]  Primary Care Physician:  Lilibeth Cota MD  Referring Physician: No ref. provider found  Date of admission: 2023    Subjective     Reason for Consult:  ESRD     HPI:   Fany Todd is a 78 y.o. female known to have history of end-stage renal disease on maintenance hemodialysis  and hyperlipidemia who presents today with clotted AV graft.  She was sent from dialysis unit.  Denies any nausea or vomiting.  No fever no chills.  No chest pain or shortness of breath.  Denies any lower extremity edema.    Review of Systems:   14 point review of systems is otherwise negative except for mentioned above, history of hypertension on HPI    Personal History     Past Medical History:   Diagnosis Date    Anemia     Anesthesia     HARD TO WAKE UP    Breast cancer     Left, Stage I    Chronic renal insufficiency     Disease of thyroid gland     End stage renal disease     On dialysis since     M-W-F    GERD (gastroesophageal reflux disease)     History of Clostridioides difficile colitis 01/15/2022    History of transfusion 2022    NO REACTION    Hyperlipidemia     Hypertension     Peritoneal dialysis catheter in place        Past Surgical History:   Procedure Laterality Date    APPENDECTOMY      ARTERIOVENOUS FISTULA/SHUNT SURGERY Left 2022    Procedure: LEFT BRACHIAL AXILLARY ARTERIOVENOUS SHUNT;  Surgeon: Liborio Dumont MD;  Location: [facility] OR;  Service: Vascular;  Laterality: Left;    BREAST BIOPSY Left     COLONOSCOPY      COLONOSCOPY N/A 01/15/2022    Procedure: COLONOSCOPY TO CECUM;  Surgeon: Gilmar Rosen MD;  Location: [facility] ENDOSCOPY;  Service: Gastroenterology;  Laterality: N/A;  GI BLEED  --HEMATIN     ENDOSCOPY N/A 01/15/2022    Procedure: ESOPHAGOGASTRODUODENOSCOPY;  Surgeon: Gilmar Rosen MD;  Location: [facility]  The pathology results demonstrated Hyperplastic and Tubular adenoma. Per Dr Hernandez 5 year recall.   ENDOSCOPY;  Service: Gastroenterology;  Laterality: N/A;  GI BLEED  --DUODENAL ULCER     FOOT SURGERY Right 2011    BROKEN FOOT     INSERTION PERITONEAL DIALYSIS CATHETER N/A 12/27/2021    Procedure: REMOVAL AND PLACEMENT OF PERITONEAL DIALYSIS CATHETER LAPAROSCOPIC;  Surgeon: Pablo Severino MD;  Location: [facility] MAIN OR;  Service: General;  Laterality: N/A;    INSERTION PERITONEAL DIALYSIS CATHETER N/A 05/03/2022    Procedure: REMOVAL PERITONEAL DIALYSIS CATHETER;  Surgeon: Pablo Severino MD;  [facility] OR;  Service: General;  Laterality: N/A;    MASTECTOMY Left 2015    TUBAL ABDOMINAL LIGATION      TUNNELED VENOUS CATHETER PLACEMENT Right 05/2022       Family History: family history includes Breast cancer in her sister; Cancer in her cousin, maternal aunt, and maternal uncle; Cervical cancer in her mother; Heart disease in her maternal aunt and maternal grandmother; Hypertension in her father and sister; Kidney cancer in her father; Kidney disease in her father.    Social History:  reports that she has never smoked. She has never used smokeless tobacco. She reports that she does not drink alcohol and does not use drugs.    Home Medications:  Prior to Admission medications    Medication Sig Start Date End Date Taking? Authorizing Provider   amLODIPine (NORVASC) 10 MG tablet Take 1 tablet by mouth Daily.    ProviderSwati MD   amoxicillin (AMOXIL) 500 MG capsule Take 1 capsule by mouth 2 (Two) Times a Day. 5/28/23   Obi Jackman PA   atorvastatin (LIPITOR) 20 MG tablet Take 1 tablet by mouth Daily.    Swati Wilcox MD   B Complex-C-Folic Acid (RENAL MULTIVITAMIN FORMULA PO) Take 1 tablet by mouth Every Morning. With iron    Swati Wilcox MD   calcitriol (ROCALTROL) 0.25 MCG capsule Take 1 capsule by mouth Daily. 7/19/21   Swati Wilcox MD   carvedilol (COREG) 12.5 MG tablet Take 1 tablet by mouth Daily.    Swati Wilcox MD   Cholecalciferol (VITAMIN D)  2000 units tablet Take 2,000 Units by mouth 3 (Three) Times a Day.    Swati Wilcox MD   NON FORMULARY Cinacolcet 30 mg nightly    Swati Wilcox MD   omeprazole (priLOSEC) 20 MG capsule Take 1 capsule by mouth Daily.    Swati Wilcox MD   rOPINIRole (REQUIP) 0.25 MG tablet Take 1 tablet by mouth Every Night. Take 1 hour before bedtime.    Swati Wilcox MD   sevelamer (RENVELA) 2.4 g pack packet Take 1 packet by mouth 3 (Three) Times a Day With Meals.    Swati Wilcox MD       Allergies:  No Known Allergies    Objective     Vitals:   Temp:  [96.1 °F (35.6 °C)] 96.1 °F (35.6 °C)  Heart Rate:  [70] 70  Resp:  [18] 18  BP: (178)/(78) 178/78  No intake or output data in the 24 hours ending 11/17/23 1525    Physical Exam:   Constitutional: Awake, alert, no acute distress.  HEENT: Sclera anicteric, no conjunctival injection  Neck: Supple, no thyromegaly, no lymphadenopathy, trachea at midline, no JVD  Respiratory: Clear to auscultation bilaterally, nonlabored respiration  Cardiovascular: RRR, no murmurs, no rubs or gallops, no carotid bruit  Gastrointestinal: Positive bowel sounds, abdomen is soft, nontender and nondistended  : No palpable bladder  Musculoskeletal: No edema, no clubbing or cyanosis  Psychiatric: Appropriate affect, cooperative  Neurologic: Oriented x3, moving all extremities, normal speech and mental status  Skin: Warm and dry   Left upper extremity AV graft with no thrill    Scheduled Meds:     sodium zirconium cyclosilicate, 10 g, Oral, Once      IV Meds:        Results Reviewed:   I have personally reviewed the results from the time of this admission to 11/17/2023 15:25 EST     Lab Results   Component Value Date    GLUCOSE 95 11/17/2023    CALCIUM 9.6 11/17/2023     11/17/2023    K 5.2 11/17/2023    CO2 26.2 11/17/2023    CL 97 (L) 11/17/2023    BUN 51 (H) 11/17/2023    CREATININE 8.24 (H) 11/17/2023    EGFRIFAFRI  02/04/2022      Comment:      <15  Indicative of kidney failure.    EGFRIFNONA 6 (L) 02/04/2022    BCR 6.2 (L) 11/17/2023    ANIONGAP 14.8 11/17/2023      Lab Results   Component Value Date    MG 2.9 (H) 02/01/2022    PHOS 3.2 02/04/2022    ALBUMIN 4.6 11/17/2023           Assessment / Plan     ASSESSMENT:  End-stage renal disease on maintenance hemodialysis on Monday Wednesday and Friday with clotted AV graft.  Clinically stable with no signs of volume overload and electrolytes are acceptable except for potassium 5.2.  She is a well-known patient to me and she does not have major issues on dialysis with no major of signs of volume overload.  Hypertension with CKD: Blood pressure is elevated today.  Hyperphosphatemia  Clotted AV graft        PLAN:  Discussed with Dr. Burgos.  Plan noted pilar reactive to declot AV graft on Monday  Patient has no issues overall with volume overload.  Instructed her to limit her fluid intake and to limit potassium intake.  We will start Lokelma twice daily till Monday.  Instructed her to come back to the ER if any she is feels short of breath  Plan to go for dialysis Monday afternoon      Thank you for involving us in the care of Fany Todd.  Please feel free to call with any questions.    Pilar Greco MD  11/17/23  15:25 [facility]    [facility]  [phone]    Parts of this note may be an electronic transcription/translation of spoken language to printed text using the Dragon dictation system.

## 2023-11-17 NOTE — ED PROVIDER NOTES
EMERGENCY DEPARTMENT ENCOUNTER    Room Number:  34/34  PCP: Lilibeth Cota MD  History obtained from: Patient      HPI:  Chief Complaint: No blood from dialysis access  A complete HPI/ROS/PMH/PSH/SH/FH are unobtainable due to: Not applicable  Context: Fany Todd is a 78 y.o. female who presents to the ED c/o concern for clotted dialysis access.  Went to dialysis today however they were unable to get blood back.  Patient denies any other recent illness, fever, chills.  Got dialysis on Wednesday.            PAST MEDICAL HISTORY  Active Ambulatory Problems     Diagnosis Date Noted    Anemia 11/04/2016    History of breast cancer 11/04/2016    ESRD (end stage renal disease) 12/26/2021    Generalized abdominal pain 01/10/2022    Heme positive stool 01/14/2022    Generalized weakness 01/30/2022     Resolved Ambulatory Problems     Diagnosis Date Noted    No Resolved Ambulatory Problems     Past Medical History:   Diagnosis Date    Anesthesia     Breast cancer 2015    Chronic renal insufficiency     Disease of thyroid gland     End stage renal disease     GERD (gastroesophageal reflux disease)     History of Clostridioides difficile colitis 01/15/2022    History of transfusion 01/2022    Hyperlipidemia     Hypertension     Peritoneal dialysis catheter in place          PAST SURGICAL HISTORY  Past Surgical History:   Procedure Laterality Date    APPENDECTOMY      ARTERIOVENOUS FISTULA/SHUNT SURGERY Left 6/7/2022    Procedure: LEFT BRACHIAL AXILLARY ARTERIOVENOUS SHUNT;  Surgeon: Liborio Dumont MD;  Location: [facility] OR;  Service: Vascular;  Laterality: Left;    BREAST BIOPSY Left 2015    COLONOSCOPY      COLONOSCOPY N/A 01/15/2022    Procedure: COLONOSCOPY TO CECUM;  Surgeon: Gilmar Rosen MD;  Location: [facility] ENDOSCOPY;  Service: Gastroenterology;  Laterality: N/A;  GI BLEED  --HEMATIN     ENDOSCOPY N/A 01/15/2022    Procedure: ESOPHAGOGASTRODUODENOSCOPY;  Surgeon: Gilmar Rosen  MD;  Location: [facility] ENDOSCOPY;  Service: Gastroenterology;  Laterality: N/A;  GI BLEED  --DUODENAL ULCER     FOOT SURGERY Right 2011    BROKEN FOOT     INSERTION PERITONEAL DIALYSIS CATHETER N/A 12/27/2021    Procedure: REMOVAL AND PLACEMENT OF PERITONEAL DIALYSIS CATHETER LAPAROSCOPIC;  Surgeon: Pablo Severino MD;  Location: [facility] MAIN OR;  Service: General;  Laterality: N/A;    INSERTION PERITONEAL DIALYSIS CATHETER N/A 05/03/2022    Procedure: REMOVAL PERITONEAL DIALYSIS CATHETER;  Surgeon: Pablo Severino MD;  Location: [facility] MAIN OR;  Service: General;  Laterality: N/A;    MASTECTOMY Left 2015    TUBAL ABDOMINAL LIGATION      TUNNELED VENOUS CATHETER PLACEMENT Right 05/2022         FAMILY HISTORY  Family History   Problem Relation Age of Onset    Cervical cancer Mother     Hypertension Father     Kidney cancer Father     Kidney disease Father     Breast cancer Sister     Hypertension Sister     Heart disease Maternal Aunt     Cancer Maternal Aunt     Cancer Maternal Uncle     Heart disease Maternal Grandmother     Cancer Cousin     Malig Hyperthermia Neg Hx          SOCIAL HISTORY  Social History     Socioeconomic History    Marital status: Single    Years of education: High school   Tobacco Use    Smoking status: Never    Smokeless tobacco: Never   Vaping Use    Vaping Use: Never used   Substance and Sexual Activity    Alcohol use: No    Drug use: No    Sexual activity: Defer         ALLERGIES  Patient has no known allergies.        REVIEW OF SYSTEMS    As per HPI      PHYSICAL EXAM  ED Triage Vitals   Temp Heart Rate Resp BP SpO2   11/17/23 1239 11/17/23 1239 11/17/23 1239 11/17/23 1250 11/17/23 1239   96.1 °F (35.6 °C) 70 18 178/78 98 %      Temp src Heart Rate Source Patient Position BP Location FiO2 (%)   11/17/23 1239 11/17/23 1239 -- -- --   Tympanic Monitor          Physical Exam  Constitutional:       General: She is not in acute distress.  HENT:      Head: Normocephalic and atraumatic.    Cardiovascular:      Rate and Rhythm: Normal rate and regular rhythm.   Pulmonary:      Effort: Pulmonary effort is normal. No respiratory distress.   Abdominal:      General: There is no distension.      Palpations: Abdomen is soft.      Tenderness: There is no abdominal tenderness.   Musculoskeletal:         General: No swelling or deformity.      Comments: Left upper extremity AV graft without pulse or palpable thrill   Skin:     General: Skin is warm and dry.   Neurological:      Mental Status: She is alert. Mental status is at baseline.           Vital signs and nursing notes reviewed.          LAB RESULTS  Recent Results (from the past 24 hour(s))   CBC Auto Differential    Collection Time: 11/17/23  1:21 PM    Specimen: Blood   Result Value Ref Range    WBC 4.74 3.40 - 10.80 10*3/mm3    RBC 3.39 (L) 3.77 - 5.28 10*6/mm3    Hemoglobin 11.2 (L) 12.0 - 15.9 g/dL    Hematocrit 33.4 (L) 34.0 - 46.6 %    MCV 98.5 (H) 79.0 - 97.0 fL    MCH 33.0 26.6 - 33.0 pg    MCHC 33.5 31.5 - 35.7 g/dL    RDW 16.7 (H) 12.3 - 15.4 %    RDW-SD 60.3 (H) 37.0 - 54.0 fl    MPV 10.5 6.0 - 12.0 fL    Platelets 147 140 - 450 10*3/mm3    Neutrophil % 68.4 42.7 - 76.0 %    Lymphocyte % 11.8 (L) 19.6 - 45.3 %    Monocyte % 9.1 5.0 - 12.0 %    Eosinophil % 9.7 (H) 0.3 - 6.2 %    Basophil % 0.8 0.0 - 1.5 %    Immature Grans % 0.2 0.0 - 0.5 %    Neutrophils, Absolute 3.24 1.70 - 7.00 10*3/mm3    Lymphocytes, Absolute 0.56 (L) 0.70 - 3.10 10*3/mm3    Monocytes, Absolute 0.43 0.10 - 0.90 10*3/mm3    Eosinophils, Absolute 0.46 (H) 0.00 - 0.40 10*3/mm3    Basophils, Absolute 0.04 0.00 - 0.20 10*3/mm3    Immature Grans, Absolute 0.01 0.00 - 0.05 10*3/mm3    nRBC 0.0 0.0 - 0.2 /100 WBC   Comprehensive Metabolic Panel    Collection Time: 11/17/23  2:24 PM    Specimen: Blood   Result Value Ref Range    Glucose 95 65 - 99 mg/dL    BUN 51 (H) 8 - 23 mg/dL    Creatinine 8.24 (H) 0.57 - 1.00 mg/dL    Sodium 138 136 - 145 mmol/L    Potassium 5.2 3.5 -  5.2 mmol/L    Chloride 97 (L) 98 - 107 mmol/L    CO2 26.2 22.0 - 29.0 mmol/L    Calcium 9.6 8.6 - 10.5 mg/dL    Total Protein 7.0 6.0 - 8.5 g/dL    Albumin 4.6 3.5 - 5.2 g/dL    ALT (SGPT) 12 1 - 33 U/L    AST (SGOT) 24 1 - 32 U/L    Alkaline Phosphatase 105 39 - 117 U/L    Total Bilirubin 0.5 0.0 - 1.2 mg/dL    Globulin 2.4 gm/dL    A/G Ratio 1.9 g/dL    BUN/Creatinine Ratio 6.2 (L) 7.0 - 25.0    Anion Gap 14.8 5.0 - 15.0 mmol/L    eGFR 4.6 (L) >60.0 mL/min/1.73       Ordered the above labs and reviewed the results.        RADIOLOGY  No Radiology Exams Resulted Within Past 24 Hours    Ordered the above noted radiological studies. Reviewed by me in PACS.                  MEDICATIONS GIVEN IN ER  Medications   sodium zirconium cyclosilicate (LOKELMA) pack 10 g (has no administration in time range)               MEDICAL DECISION MAKING, PROGRESS, and CONSULTS    MDM: Patient presented emergency department with thrombosed AV graft, otherwise well-appearing, vitals otherwise stable.  Labs reassuring.  Not hyperkalemic.  Evaluated by vascular surgery, will plan for thrombectomy in the office on Monday.  Discussed with nephrology, Dr. Inocencio Ruby, will start on Lokelma to keep her potassium under control until she is able to get dialysis on Monday.  Given return precautions and discharged home.    All labs have been independently reviewed by me.  All radiology studies have been reviewed by me and I have also reviewed the radiology report.   EKG's independently viewed and interpreted by me.  Discussion below represents my analysis of pertinent findings related to patient's condition, differential diagnosis, treatment plan and final disposition.      Additional sources:  - Discussed/ obtained information from independent historians:      - External (non-ED) record review:     - Chronic or social conditions impacting care: End-stage renal disease    - Shared decision making: Discussed plan for close follow-up, Jaye, patient  agrees.      Orders placed during this visit:  Orders Placed This Encounter   Procedures    Comprehensive Metabolic Panel    CBC Auto Differential    Nephrology (on -call MD unless specified)    Vascular Surgery Consult    CBC & Differential         Additional orders considered but not ordered:  Considered ultrasound of the graft however it appears thrombosed on exam.        Differential diagnosis includes but is not limited to:    Thrombosed graft, infection, cellulitis      Independent interpretation of labs, radiology studies, and discussions with consultants:           DIAGNOSIS  Final diagnoses:   Thrombosis of arteriovenous graft, initial encounter         DISPOSITION  Discharged home        Latest Documented Vital Signs:  As of 15:29 EST  BP- 178/78 HR- 70 Temp- 96.1 °F (35.6 °C) (Tympanic) O2 sat- 98%              --    Please note that portions of this were completed with a voice recognition program.       Note Disclaimer: At [facility], we believe that sharing information builds trust and better relationships. You are receiving this note because you are receiving care at [facility] or recently visited. It is possible you will see health information before a provider has talked with you about it. This kind of information can be easy to misunderstand. To help you fully understand what it means for your health, we urge you to discuss this note with your provider.             Dejuan Perdomo MD  11/17/23 [id number]

## 2023-11-20 DIAGNOSIS — T82.868A THROMBOSIS OF KIDNEY DIALYSIS ARTERIOVENOUS GRAFT, INITIAL ENCOUNTER: Primary | ICD-10-CM

## 2023-11-20 RX ORDER — HYDROCODONE BITARTRATE AND ACETAMINOPHEN 5; 325 MG/1; MG/1
1 TABLET ORAL EVERY 4 HOURS PRN
Qty: 20 TABLET | Refills: 0 | Status: SHIPPED | OUTPATIENT
Start: 2023-11-20

## 2024-04-08 PROBLEM — Z99.2 HEMODIALYSIS STATUS: Status: ACTIVE | Noted: 2024-04-08

## 2024-04-09 ENCOUNTER — OFFICE VISIT (OUTPATIENT)
Age: 79
End: 2024-04-09
Payer: MEDICARE

## 2024-04-09 ENCOUNTER — HOSPITAL ENCOUNTER (OUTPATIENT)
Facility: HOSPITAL | Age: 79
Discharge: HOME | End: 2024-04-09
Admitting: SURGERY
Payer: MEDICARE

## 2024-04-09 VITALS
SYSTOLIC BLOOD PRESSURE: 167 MMHG | BODY MASS INDEX: 18.78 KG/M2 | HEIGHT: 63 IN | WEIGHT: 106 LBS | HEART RATE: 70 BPM | DIASTOLIC BLOOD PRESSURE: 72 MMHG

## 2024-04-09 DIAGNOSIS — Z99.2 DEPENDENCE ON HEMODIALYSIS: ICD-10-CM

## 2024-04-09 DIAGNOSIS — N18.6 ESRD (END STAGE RENAL DISEASE): Primary | ICD-10-CM

## 2024-04-09 DIAGNOSIS — I10 ESSENTIAL HYPERTENSION: ICD-10-CM

## 2024-04-09 LAB
BH CV VAS DIALYSIS ARTERIAL ANASTOMOSIS DIAMETER: 0.36 CM
BH CV VAS DIALYSIS ARTERIAL ANASTOMOSIS EDV: 413 CM/SEC
BH CV VAS DIALYSIS ARTERIAL ANASTOMOSIS PSV: 728 CM/SEC
BH CV VAS DIALYSIS CONDUIT DIST DEPTH: 0.22 CM
BH CV VAS DIALYSIS CONDUIT DIST DIAMETER: 0.45 CM
BH CV VAS DIALYSIS CONDUIT DIST EDV: 118 CM/SEC
BH CV VAS DIALYSIS CONDUIT DIST FLOW VOL: 541 ML/MIN
BH CV VAS DIALYSIS CONDUIT DIST PSV: 220 CM/SEC
BH CV VAS DIALYSIS CONDUIT MID DEPTH: 0.38 CM
BH CV VAS DIALYSIS CONDUIT MID DIAMETER: 0.16 CM
BH CV VAS DIALYSIS CONDUIT MID EDV: 348 CM/SEC
BH CV VAS DIALYSIS CONDUIT MID PSV: 644 CM/SEC
BH CV VAS DIALYSIS CONDUIT MID/DIST DEPTH: 0.26 CM
BH CV VAS DIALYSIS CONDUIT MID/DIST DIAMETER: 0.42 CM
BH CV VAS DIALYSIS CONDUIT MID/DIST EDV: 205 CM/SEC
BH CV VAS DIALYSIS CONDUIT MID/DIST PSV: 417 CM/SEC
BH CV VAS DIALYSIS CONDUIT PROX DEPTH: 0.15 CM
BH CV VAS DIALYSIS CONDUIT PROX DIAMETER: 0.39 CM
BH CV VAS DIALYSIS CONDUIT PROX EDV: 249 CM/SEC
BH CV VAS DIALYSIS CONDUIT PROX FLOW VOL: 509 ML/MIN
BH CV VAS DIALYSIS CONDUIT PROX PSV: 450 CM/SEC
BH CV VAS DIALYSIS CONDUIT PROX/MID DEPTH: 0.36 CM
BH CV VAS DIALYSIS CONDUIT PROX/MID DIAMETER: 0.51 CM
BH CV VAS DIALYSIS CONDUIT PROX/MID EDV: 125 CM/SEC
BH CV VAS DIALYSIS CONDUIT PROX/MID PSV: 219 CM/SEC
BH CV VAS DIALYSIS PRE-INFLOW BRACHIAL DIAMETER: 0.55 CM
BH CV VAS DIALYSIS PRE-INFLOW BRACHIAL EDV: 97 CM/SEC
BH CV VAS DIALYSIS PRE-INFLOW BRACHIAL FLOW VOL: 817 ML/MIN
BH CV VAS DIALYSIS PRE-INFLOW BRACHIAL PSV: 194 CM/SEC
BH CV VAS DIALYSIS VENOUS ANASTOMOSIS DIAMETER: 0.26 CM
BH CV VAS DIALYSIS VENOUS ANASTOMOSIS EDV: 124 CM/SEC
BH CV VAS DIALYSIS VENOUS ANASTOMOSIS PSV: 258 CM/SEC
BH CV VAS DIALYSIS VENOUS OUTFLOW AXILLARY DIAMETER: 0.92 CM
BH CV VAS DIALYSIS VENOUS OUTFLOW AXILLARY EDV: 76 CM/SEC
BH CV VAS DIALYSIS VENOUS OUTFLOW AXILLARY PSV: 155 CM/SEC
BH CV VAS DIALYSIS VENOUS OUTFLOW SUBCLAVIAN DIAMETER: 0.78 CM

## 2024-04-09 PROCEDURE — 93990 DOPPLER FLOW TESTING: CPT

## 2024-04-09 NOTE — PROGRESS NOTES
Chief Complaint  Hemodialysis Access    Subjective        Fany Todd presents to [facility] VASCULAR SURGERY  HPI   Fany Todd is a 79 y.o. female that has been followed in our office for hemodialysis access. She has the following access in place: Left brachial axillary AV graft. Her last intervention was 11/20/2023 where she had a thrombectomy.  She returns today in follow up along with an AV duplex.  She reports she has had some issues with prolonged bleeding.     Review of Systems   Constitutional:  Negative for fever.   Eyes:  Negative for visual disturbance.   Cardiovascular:  Negative for leg swelling.   Gastrointestinal:  Negative for abdominal pain.   Musculoskeletal:  Negative for back pain.   Skin:  Negative for color change, pallor and wound.   Neurological:  Negative for dizziness, facial asymmetry, speech difficulty and weakness.        Fany Todd  reports that she has never smoked. She has never used smokeless tobacco..        Objective   Vital Signs:  Vitals:    04/09/24 1130   BP: 167/72   Pulse: 70      Body mass index is 18.78 kg/m².       Physical Exam  Vitals reviewed.   Constitutional:       Appearance: Normal appearance.   HENT:      Head: Normocephalic.   Cardiovascular:      Rate and Rhythm: Normal rate and regular rhythm.      Pulses: Normal pulses.           Dorsalis pedis pulses are 3+ on the right side and 3+ on the left side.        Posterior tibial pulses are 3+ on the right side and 3+ on the left side.      Arteriovenous access: Left arteriovenous access is present.  Pulmonary:      Effort: Pulmonary effort is normal.   Skin:     General: Skin is warm.   Neurological:      General: No focal deficit present.      Mental Status: She is alert and oriented to person, place, and time.   Psychiatric:         Mood and Affect: Mood normal.          Result Review :        Duplex from today:    Her arterial inflow was 817 cc/min.  Volume flow is 509 cc/min.   She has stenosis at the mid segment and vein anastomosis with diameter between 0.1 and 0.2 cm.                 Assessment and Plan     Diagnoses and all orders for this visit:    1. ESRD (end stage renal disease) (Primary)  Assessment & Plan:  2022: Left brachial axillary AV graft      2. Essential hypertension  -     Ambulatory Referral to Family Practice          Patient has decreased access flows as well as stenosis seen at the mid segment and vein anastomosis.  For this reason, I have recommended that she proceed with a fistulogram.  We discussed the risks including bleeding, hematoma formation, infection, steal syndrome, thrombosis of access, nerve injury.  She  is agreeable to proceed.  She also had elevated blood pressure today therefore we will notify her primary care provider.    Follow Up     Return in about 1 week (around 4/16/2024) for fistulogram on Tuesday/Thursday .  Patient was given instructions and counseling regarding her condition or for health maintenance advice. Please see specific information pulled into the AVS if appropriate.     Christy Childress APRN

## 2024-04-18 ENCOUNTER — OUTSIDE FACILITY SERVICE (OUTPATIENT)
Age: 79
End: 2024-04-18
Payer: MEDICARE

## 2024-04-18 DIAGNOSIS — N18.6 ESRD (END STAGE RENAL DISEASE): Primary | ICD-10-CM

## 2024-04-19 ENCOUNTER — TELEPHONE (OUTPATIENT)
Age: 79
End: 2024-04-19
Payer: MEDICARE

## 2024-04-19 NOTE — TELEPHONE ENCOUNTER
Kristen from [facility] called to inform us that they cannot remove her stitch from her procedure she has done with us.   Call back [phone]

## 2024-08-19 ENCOUNTER — TELEPHONE (OUTPATIENT)
Age: 79
End: 2024-08-19
Payer: MEDICARE

## 2024-08-19 DIAGNOSIS — N18.6 ESRD (END STAGE RENAL DISEASE): Primary | ICD-10-CM

## 2024-08-19 NOTE — TELEPHONE ENCOUNTER
Moiz from [facility] called and stated this pt is having decreased access flows. I recommended a Duplex scan to lalitha on the AVF function.   Scan is ordered and Sent to scheduling for apt.

## 2024-08-30 ENCOUNTER — OFFICE VISIT (OUTPATIENT)
Age: 79
End: 2024-08-30
Payer: MEDICARE

## 2024-08-30 ENCOUNTER — HOSPITAL ENCOUNTER (OUTPATIENT)
Facility: HOSPITAL | Age: 79
Discharge: HOME | End: 2024-08-30
Payer: MEDICARE

## 2024-08-30 VITALS
BODY MASS INDEX: 18.78 KG/M2 | WEIGHT: 106 LBS | DIASTOLIC BLOOD PRESSURE: 72 MMHG | HEIGHT: 63 IN | HEART RATE: 73 BPM | SYSTOLIC BLOOD PRESSURE: 197 MMHG

## 2024-08-30 DIAGNOSIS — N18.6 ESRD (END STAGE RENAL DISEASE): Primary | ICD-10-CM

## 2024-08-30 DIAGNOSIS — N18.6 ESRD (END STAGE RENAL DISEASE): ICD-10-CM

## 2024-08-30 LAB
BH CV VAS DIALYSIS ARTERIAL ANASTOMOSIS DIAMETER: 0.43 CM
BH CV VAS DIALYSIS ARTERIAL ANASTOMOSIS EDV: 263 CM/SEC
BH CV VAS DIALYSIS ARTERIAL ANASTOMOSIS PSV: 501 CM/SEC
BH CV VAS DIALYSIS CONDUIT DIST DEPTH: 0.16 CM
BH CV VAS DIALYSIS CONDUIT DIST DIAMETER: 0.38 CM
BH CV VAS DIALYSIS CONDUIT DIST EDV: 99 CM/SEC
BH CV VAS DIALYSIS CONDUIT DIST PSV: 172 CM/SEC
BH CV VAS DIALYSIS CONDUIT MID DEPTH: 0.35 CM
BH CV VAS DIALYSIS CONDUIT MID DIAMETER: 0.37 CM
BH CV VAS DIALYSIS CONDUIT MID EDV: 180 CM/SEC
BH CV VAS DIALYSIS CONDUIT MID PSV: 329 CM/SEC
BH CV VAS DIALYSIS CONDUIT MID/DIST DEPTH: 0.32 CM
BH CV VAS DIALYSIS CONDUIT MID/DIST DIAMETER: 0.2 CM
BH CV VAS DIALYSIS CONDUIT MID/DIST EDV: 534 CM/SEC
BH CV VAS DIALYSIS CONDUIT MID/DIST PSV: 944 CM/SEC
BH CV VAS DIALYSIS CONDUIT PROX DEPTH: 0.14 CM
BH CV VAS DIALYSIS CONDUIT PROX DIAMETER: 0.39 CM
BH CV VAS DIALYSIS CONDUIT PROX EDV: 128 CM/SEC
BH CV VAS DIALYSIS CONDUIT PROX FLOW VOL: 508 ML/MIN
BH CV VAS DIALYSIS CONDUIT PROX PSV: 211 CM/SEC
BH CV VAS DIALYSIS CONDUIT PROX/MID DEPTH: 0.43 CM
BH CV VAS DIALYSIS CONDUIT PROX/MID DIAMETER: 0.59 CM
BH CV VAS DIALYSIS CONDUIT PROX/MID EDV: 130 CM/SEC
BH CV VAS DIALYSIS CONDUIT PROX/MID FLOW VOL: 1019 ML/MIN
BH CV VAS DIALYSIS CONDUIT PROX/MID PSV: 202 CM/SEC
BH CV VAS DIALYSIS PRE-INFLOW BRACHIAL DIAMETER: 0.61 CM
BH CV VAS DIALYSIS PRE-INFLOW BRACHIAL EDV: 333 CM/SEC
BH CV VAS DIALYSIS PRE-INFLOW BRACHIAL FLOW VOL: 1461 ML/MIN
BH CV VAS DIALYSIS PRE-INFLOW BRACHIAL PSV: 566 CM/SEC
BH CV VAS DIALYSIS VENOUS ANASTOMOSIS DIAMETER: 0.25 CM
BH CV VAS DIALYSIS VENOUS ANASTOMOSIS EDV: 183 CM/SEC
BH CV VAS DIALYSIS VENOUS ANASTOMOSIS PSV: 332 CM/SEC
BH CV VAS DIALYSIS VENOUS OUTFLOW AXILLARY DIAMETER: 1.15 CM
BH CV VAS DIALYSIS VENOUS OUTFLOW AXILLARY EDV: 141 CM/SEC
BH CV VAS DIALYSIS VENOUS OUTFLOW AXILLARY PSV: 252 CM/SEC
BH CV VAS DIALYSIS VENOUS OUTFLOW SUBCLAVIAN DIAMETER: 0.89 CM
BH CV VAS DIALYSIS VENOUS OUTFLOW SUBCLAVIAN EDV: 61 CM/SEC
BH CV VAS DIALYSIS VENOUS OUTFLOW SUBCLAVIAN PSV: 91 CM/SEC

## 2024-08-30 PROCEDURE — 93990 DOPPLER FLOW TESTING: CPT

## 2024-08-30 NOTE — PROGRESS NOTES
Chief Complaint  Follow-up    Subjective        Fany Todd presents to [facility] VASCULAR SURGERY  HPI   Fany Todd is a 79 y.o. female that has been followed in our office for hemodialysis access. She has the following access in place: Left brachial axillary AV graft. Her last intervention was 4/18/2024 where she had a fistulogram with angioplasty.  I received a call from the clinic reporting decreased access flows therefore she returns today in follow up along with an AV duplex.  She reports she is also had issues with prolonged bleeding intermittently.    Review of Systems   Constitutional:  Negative for fever.   Eyes:  Negative for visual disturbance.   Cardiovascular:  Negative for leg swelling.   Gastrointestinal:  Negative for abdominal pain.   Musculoskeletal:  Negative for back pain.   Skin:  Negative for color change, pallor and wound.   Neurological:  Negative for dizziness, facial asymmetry, speech difficulty and weakness.        Fany Todd  reports that she has never smoked. She has never used smokeless tobacco..        Objective   Vital Signs:  Vitals:    08/30/24 1058   BP: (!) 197/72   Pulse: 73        Body mass index is 18.78 kg/m².       Physical Exam  Vitals reviewed.   Constitutional:       Appearance: Normal appearance.   HENT:      Head: Normocephalic.   Cardiovascular:      Rate and Rhythm: Normal rate and regular rhythm.      Pulses: Normal pulses.           Dorsalis pedis pulses are 3+ on the right side and 3+ on the left side.        Posterior tibial pulses are 3+ on the right side and 3+ on the left side.      Arteriovenous access: Left arteriovenous access is present.  Pulmonary:      Effort: Pulmonary effort is normal.   Skin:     General: Skin is warm.   Neurological:      General: No focal deficit present.      Mental Status: She is alert and oriented to person, place, and time.   Psychiatric:         Mood and Affect: Mood normal.          Result  Review :        Duplex from today:Duplex Hemodialysis Access CAR (08/30/2024 10:37)                      Assessment and Plan     Diagnoses and all orders for this visit:    1. ESRD (end stage renal disease) (Primary)  -     Dialysis Circuit Angiography (Fistulogram); Future            Patient has decreased access flows as well as stenosis seen at the mid distal segment and vein anastomosis. She has also had low volume flows at dialysis.  For this reason, I have recommended that she proceed with a fistulogram.  We discussed the risks including bleeding, hematoma formation, infection, steal syndrome, thrombosis of access, nerve injury.  She  is agreeable to proceed.  She also had elevated blood pressure today therefore we will notify her primary care provider.    Follow Up     Return for fistulogram T, TH.  Patient was given instructions and counseling regarding her condition or for health maintenance advice. Please see specific information pulled into the AVS if appropriate.     Christy Childress, SAW

## 2024-09-20 ENCOUNTER — TELEPHONE (OUTPATIENT)
Age: 79
End: 2024-09-20
Payer: MEDICARE

## 2024-10-10 DIAGNOSIS — N18.6 ESRD (END STAGE RENAL DISEASE): Primary | ICD-10-CM

## (undated) DEVICE — SUT PROLN 7/0 BV175/6 D/A 24IN 8735H

## (undated) DEVICE — GLV SURG BIOGEL LTX PF 6

## (undated) DEVICE — DRSNG SURESITE WNDW 4X4.5

## (undated) DEVICE — ANTIBACTERIAL UNDYED BRAIDED (POLYGLACTIN 910), SYNTHETIC ABSORBABLE SUTURE: Brand: COATED VICRYL

## (undated) DEVICE — SUT ETHLN 2/0 PS 18IN 585H

## (undated) DEVICE — GOWN ,SIRUS,NONREINFORCED SMALL: Brand: MEDLINE

## (undated) DEVICE — SUT VIC 5/0 PS2 18IN J495H

## (undated) DEVICE — LOU LAP CHOLE: Brand: MEDLINE INDUSTRIES, INC.

## (undated) DEVICE — TRAP FLD MINIVAC MEGADYNE 100ML

## (undated) DEVICE — GLV SURG PREMIERPRO ORTHO LTX PF SZ7.5 BRN

## (undated) DEVICE — BITEBLOCK OMNI BLOC

## (undated) DEVICE — ADAPT CLN BIOGUARD AIR/H2O DISP

## (undated) DEVICE — SOL NS 500ML

## (undated) DEVICE — SUT SILK 2/0 TIES 18IN A185H

## (undated) DEVICE — SKIN PREP TRAY W/CHG: Brand: MEDLINE INDUSTRIES, INC.

## (undated) DEVICE — ENDOPATH XCEL BLADELESS TROCARS WITH STABILITY SLEEVES: Brand: ENDOPATH XCEL

## (undated) DEVICE — GLV SURG SIGNATURE ESSENTIAL PF LTX SZ7.5

## (undated) DEVICE — SUT SILK 4/0 TIES 18IN A183H

## (undated) DEVICE — SYR LUERLOK 20CC BX/50

## (undated) DEVICE — 3M™ STERI-STRIP™ COMPOUND BENZOIN TINCTURE 40 BAGS/CARTON 4 CARTONS/CASE C1544: Brand: 3M™ STERI-STRIP™

## (undated) DEVICE — TBG PENCL TELESCP MEGADYNE SMOKE EVAC 10FT

## (undated) DEVICE — NDL HYPO PRECISIONGLIDE REG 25G 1 1/2

## (undated) DEVICE — ENDOPATH XCEL UNIVERSAL TROCAR STABLILITY SLEEVES: Brand: ENDOPATH XCEL

## (undated) DEVICE — 3M™ STERI-DRAPE™ INSTRUMENT POUCH 1018: Brand: STERI-DRAPE™

## (undated) DEVICE — ISOLATION BAG: Brand: CONVERTORS

## (undated) DEVICE — SUT PROLN 0 CT1 30IN 8424H

## (undated) DEVICE — PATIENT RETURN ELECTRODE, SINGLE-USE, CONTACT QUALITY MONITORING, ADULT, WITH 9FT CORD, FOR PATIENTS WEIGING OVER 33LBS. (15KG): Brand: MEGADYNE

## (undated) DEVICE — Y-TYPE BLOOD/SOLUTION SET WITH STANDARD BLOOD FILTER, 170 TO 260 MICRON FILTER, LUER ACTIVATED VALVE, MALE LUER LOCK ADAPTER WITH RETRACTABLE COLLAR: Brand: CLEARLINK

## (undated) DEVICE — EXTENSION SET, MALE LUER LOCK ADAPTER WITH RETRACTABLE COLLAR

## (undated) DEVICE — SUT PROLN 5/0 RB1 D/A 36IN 8556H

## (undated) DEVICE — LOU MINOR PROCEDURE: Brand: MEDLINE INDUSTRIES, INC.

## (undated) DEVICE — ENDOCUT SCISSOR TIP, DISPOSABLE: Brand: RENEW

## (undated) DEVICE — LN SMPL CO2 SHTRM SD STREAM W/M LUER

## (undated) DEVICE — 3M™ STERI-STRIP™ REINFORCED ADHESIVE SKIN CLOSURES, R1546, 1/4 IN X 4 IN (6 MM X 100 MM), 10 STRIPS/ENVELOPE: Brand: 3M™ STERI-STRIP™

## (undated) DEVICE — MSK PROC CURAPLEX O2 2/ADAPT 7FT

## (undated) DEVICE — KT ORCA ORCAPOD DISP STRL

## (undated) DEVICE — SUT VIC 3/0 CTI 36IN J944H

## (undated) DEVICE — SUT GORE TT9 CV6 30IN 6M02A

## (undated) DEVICE — PK AV FISTL/SHNT 40

## (undated) DEVICE — ENDOPATH PNEUMONEEDLE INSUFFLATION NEEDLES WITH LUER LOCK CONNECTORS 120MM: Brand: ENDOPATH

## (undated) DEVICE — PENCL ES MEGADINE EZ/CLEAN BUTN W/HOLSTR 10FT

## (undated) DEVICE — TUBING, SUCTION, 1/4" X 10', STRAIGHT: Brand: MEDLINE

## (undated) DEVICE — SENSR O2 OXIMAX FNGR A/ 18IN NONSTR

## (undated) DEVICE — SUT SILK 3/0 SH CR5 18IN C0135

## (undated) DEVICE — SUT PROLN 6/0 BV1 D/A 30IN 8709H